# Patient Record
Sex: FEMALE | Race: WHITE | Employment: OTHER | URBAN - METROPOLITAN AREA
[De-identification: names, ages, dates, MRNs, and addresses within clinical notes are randomized per-mention and may not be internally consistent; named-entity substitution may affect disease eponyms.]

---

## 2017-01-25 DIAGNOSIS — Z00.00 ROUTINE GENERAL MEDICAL EXAMINATION AT A HEALTH CARE FACILITY: ICD-10-CM

## 2017-01-25 DIAGNOSIS — Z13.6 CARDIOVASCULAR SCREENING; LDL GOAL LESS THAN 130: ICD-10-CM

## 2017-01-25 LAB
ANION GAP SERPL CALCULATED.3IONS-SCNC: 7 MMOL/L (ref 3–14)
BUN SERPL-MCNC: 14 MG/DL (ref 7–30)
CALCIUM SERPL-MCNC: 9.2 MG/DL (ref 8.5–10.1)
CHLORIDE SERPL-SCNC: 105 MMOL/L (ref 94–109)
CHOLEST SERPL-MCNC: 259 MG/DL
CO2 SERPL-SCNC: 30 MMOL/L (ref 20–32)
CREAT SERPL-MCNC: 0.69 MG/DL (ref 0.52–1.04)
GFR SERPL CREATININE-BSD FRML MDRD: 87 ML/MIN/1.7M2
GLUCOSE SERPL-MCNC: 100 MG/DL (ref 70–99)
HDLC SERPL-MCNC: 46 MG/DL
LDLC SERPL CALC-MCNC: 177 MG/DL
NONHDLC SERPL-MCNC: 213 MG/DL
POTASSIUM SERPL-SCNC: 4.1 MMOL/L (ref 3.4–5.3)
SODIUM SERPL-SCNC: 142 MMOL/L (ref 133–144)
TRIGL SERPL-MCNC: 181 MG/DL

## 2017-01-25 PROCEDURE — 80048 BASIC METABOLIC PNL TOTAL CA: CPT | Performed by: FAMILY MEDICINE

## 2017-01-25 PROCEDURE — 80061 LIPID PANEL: CPT | Performed by: FAMILY MEDICINE

## 2017-01-25 PROCEDURE — 36415 COLL VENOUS BLD VENIPUNCTURE: CPT | Performed by: FAMILY MEDICINE

## 2017-01-30 ENCOUNTER — OFFICE VISIT (OUTPATIENT)
Dept: FAMILY MEDICINE | Facility: CLINIC | Age: 62
End: 2017-01-30
Payer: COMMERCIAL

## 2017-01-30 VITALS
WEIGHT: 166 LBS | HEART RATE: 88 BPM | BODY MASS INDEX: 30.55 KG/M2 | RESPIRATION RATE: 16 BRPM | HEIGHT: 62 IN | DIASTOLIC BLOOD PRESSURE: 92 MMHG | TEMPERATURE: 97.2 F | SYSTOLIC BLOOD PRESSURE: 142 MMHG | OXYGEN SATURATION: 96 %

## 2017-01-30 DIAGNOSIS — Z00.00 ROUTINE GENERAL MEDICAL EXAMINATION AT A HEALTH CARE FACILITY: Primary | ICD-10-CM

## 2017-01-30 DIAGNOSIS — R05.3 PERSISTENT DRY COUGH: ICD-10-CM

## 2017-01-30 DIAGNOSIS — Z13.6 CARDIOVASCULAR SCREENING; LDL GOAL LESS THAN 160: ICD-10-CM

## 2017-01-30 PROCEDURE — 99396 PREV VISIT EST AGE 40-64: CPT | Performed by: FAMILY MEDICINE

## 2017-01-30 ASSESSMENT — PAIN SCALES - GENERAL: PAINLEVEL: NO PAIN (0)

## 2017-01-30 NOTE — NURSING NOTE
"Chief Complaint   Patient presents with     Physical       Initial /92 mmHg  Pulse 102  Temp(Src) 97.2  F (36.2  C) (Temporal)  Resp 16  Ht 5' 1.75\" (1.568 m)  Wt 166 lb (75.297 kg)  BMI 30.63 kg/m2  SpO2 96%  LMP 05/13/2004 Estimated body mass index is 30.63 kg/(m^2) as calculated from the following:    Height as of this encounter: 5' 1.75\" (1.568 m).    Weight as of this encounter: 166 lb (75.297 kg).  BP completed using cuff size: chantale Novoa MA    "

## 2017-01-30 NOTE — MR AVS SNAPSHOT
After Visit Summary   1/30/2017    Stacie Hair    MRN: [id number]           Patient Information     Date Of Birth          1955        Visit Information        Provider Department      1/30/2017 2:30 PM Schoen, Gregory G, MD [facility]        Today's Diagnoses     Routine general medical examination at a health care facility    -  1     Persistent dry cough           Care Instructions      Preventive Health Recommendations  Female Ages 50 - 64    Yearly exam: See your health care provider every year in order to  o Review health changes.   o Discuss preventive care.    o Review your medicines if your doctor has prescribed any.      Get a Pap test every three years (unless you have an abnormal result and your provider advises testing more often).    If you get Pap tests with HPV test, you only need to test every 5 years, unless you have an abnormal result.     You do not need a Pap test if your uterus was removed (hysterectomy) and you have not had cancer.    You should be tested each year for STDs (sexually transmitted diseases) if you're at risk.     Have a mammogram every 1 to 2 years.    Have a colonoscopy at age 50, or have a yearly FIT test (stool test). These exams screen for colon cancer.      Have a cholesterol test every 5 years, or more often if advised.    Have a diabetes test (fasting glucose) every three years. If you are at risk for diabetes, you should have this test more often.     If you are at risk for osteoporosis (brittle bone disease), think about having a bone density scan (DEXA).    Shots: Get a flu shot each year. Get a tetanus shot every 10 years.    Nutrition:     Eat at least 5 servings of fruits and vegetables each day.    Eat whole-grain bread, whole-wheat pasta and brown rice instead of white grains and rice.    Talk to your provider about Calcium and Vitamin D.     Lifestyle    Exercise at least 150 minutes a week (30 minutes a day, 5 days a week).  "This will help you control your weight and prevent disease.    Limit alcohol to one drink per day.    No smoking.     Wear sunscreen to prevent skin cancer.     See your dentist every six months for an exam and cleaning.    See your eye doctor every 1 to 2 years.            Follow-ups after your visit        Future tests that were ordered for you today     Open Future Orders        Priority Expected Expires Ordered    General PFT Lab (Please always keep checked) Routine  2018    Pulmonary Function Test Routine  2018            Who to contact     If you have questions or need follow up information about today's clinic visit or your schedule please contact [facility] directly at [phone].  Normal or non-critical lab and imaging results will be communicated to you by MyChart, letter or phone within 4 business days after the clinic has received the results. If you do not hear from us within 7 days, please contact the clinic through Bahuhart or phone. If you have a critical or abnormal lab result, we will notify you by phone as soon as possible.  Submit refill requests through [employer]iz[employer]une or call your pharmacy and they will forward the refill request to us. Please allow 3 business days for your refill to be completed.          Additional Information About Your Visit        BahuharSupportPay Information     [employer]iz[employer]une lets you send messages to your doctor, view your test results, renew your prescriptions, schedule appointments and more. To sign up, go to [email or web address] . Click on \"Log in\" on the left side of the screen, which will take you to the Welcome page. Then click on \"Sign up Now\" on the right side of the page.     You will be asked to enter the access code listed below, as well as some personal information. Please follow the directions to create your username and password.     Your access code is: [email or web address]  Expires: 2017  3:24 PM     Your access code will  in " "90 days. If you need help or a new code, please call your [place] clinic or [phone].        Care EveryWhere ID     This is your Care EveryWhere ID. This could be used by other organizations to access your [place] medical records  [id number]        Your Vitals Were     Pulse Temperature Respirations    88 97.2  F (36.2  C) (Temporal) 16    Height BMI (Body Mass Index) Pulse Oximetry    5' 1.75\" (1.568 m) 30.63 kg/m2 96%    Last Period          05/13/2004         Blood Pressure from Last 3 Encounters:   01/30/17 142/92   09/21/15 132/60   09/09/15 140/78    Weight from Last 3 Encounters:   01/30/17 166 lb (75.297 kg)   12/14/15 160 lb (72.576 kg)   09/28/15 157 lb (71.215 kg)                 Today's Medication Changes          These changes are accurate as of: 1/30/17  3:24 PM.  If you have any questions, ask your nurse or doctor.               Stop taking these medicines if you haven't already. Please contact your care team if you have questions.     vitamin  s/Minerals Tabs   Stopped by:  Schoen, Gregory G, MD                    Primary Care Provider Office Phone # Fax #    Gregory G Schoen, [phone]       [facility] [address] [place] [address]        Thank you!     Thank you for choosing [facility]  for your care. Our goal is always to provide you with excellent care. Hearing back from our patients is one way we can continue to improve our services. Please take a few minutes to complete the written survey that you may receive in the mail after your visit with us. Thank you!             Your Updated Medication List - Protect others around you: Learn how to safely use, store and throw away your medicines at www.disposemymeds.org.          This list is accurate as of: 1/30/17  3:24 PM.  Always use your most recent med list.                   Brand Name Dispense Instructions for use    NEW MED      VitaPulse       OMEGA 3 PO      Take by mouth " daily

## 2017-01-30 NOTE — PROGRESS NOTES
SUBJECTIVE:     CC: Stacie Hair is an 61 year old woman who presents for preventive health visit.     Here for well exam. Notes that in March she came down with a bad chest cold and thinks maybe she had pneumonia but was never seen.  She got better after several weeks and no longer has shortness of breath but she does continue to have a persistent dry cough that is more irritating than anything else.  She notes she had childhood asthma but outgrew that and has not used inhalers for years.  No issues with sinus congestion/drainage or sputum production.      Healthy Habits:    Do you get at least three servings of calcium containing foods daily (dairy, green leafy vegetables, etc.)? Maybe 1-2    Amount of exercise or daily activities, outside of work: not much    Problems taking medications regularly not applicable    Medication side effects: No    Have you had an eye exam in the past two years? no    Do you see a dentist twice per year? yes  Do you have sleep apnea, excessive snoring or daytime drowsiness?no      Current Outpatient Prescriptions   Medication Sig Dispense Refill     Omega-3 Fatty Acids (OMEGA 3 PO) Take by mouth daily       NEW MED VitaPulse             Today's PHQ-2 Score:   PHQ-2 ( 1999 Pfizer) 1/30/2017 9/9/2015   Q1: Little interest or pleasure in doing things 0 0   Q2: Feeling down, depressed or hopeless 0 0   PHQ-2 Score 0 0       Abuse: Current or Past(Physical, Sexual or Emotional)- No  Do you feel safe in your environment - Yes    Social History   Substance Use Topics     Smoking status: Former Smoker     Smokeless tobacco: Never Used      Comment: 1980-quit     Alcohol Use: 0.0 oz/week     0 Standard drinks or equivalent per week      Comment: occasional     The patient does not drink >3 drinks per day nor >7 drinks per week.    Recent Labs   Lab Test  01/25/17   1051  07/21/11   0716   CHOL  259*  254*   HDL  46*  53   LDL  177*  173*   TRIG  181*  139   CHOLHDLRATIO   --   5.0  "  [facility]  213*   --        Reviewed orders with patient.  Reviewed health maintenance and updated orders accordingly - Yes    Mammo Decision Support:  Patient over age 50, mutual decision to screen reflected in health maintenance.    Pertinent mammograms are reviewed under the imaging tab.  History of abnormal Pap smear: NO - age 30-65 PAP every 5 years with negative HPV co-testing recommended  All Histories reviewed and updated in Epic.      ROS:  C: NEGATIVE for fever, chills, has put on some weight with decreased activity over the winter  I: NEGATIVE for worrisome rashes, moles or lesions  E: NEGATIVE for vision changes or irritation  ENT: NEGATIVE for ear, mouth and throat problems  RESP:as above, chronic cough, history of asthma  B: NEGATIVE for masses, tenderness or discharge  CV: NEGATIVE for chest pain, palpitations or peripheral edema  GI: NEGATIVE for nausea, abdominal pain, heartburn, or change in bowel habits  : NEGATIVE for unusual urinary or vaginal symptoms. No vaginal bleeding.  M: NEGATIVE for significant arthralgias or myalgia  N: NEGATIVE for weakness, dizziness or paresthesias  P: NEGATIVE for changes in mood or affect       OBJECTIVE:     /92 mmHg  Pulse 102  Temp(Src) 97.2  F (36.2  C) (Temporal)  Resp 16  Ht 5' 1.75\" (1.568 m)  Wt 166 lb (75.297 kg)  BMI 30.63 kg/m2  SpO2 96%  LMP 05/13/2004  EXAM:  GENERAL: healthy, alert and no distress  EYES: Eyes grossly normal to inspection, PERRL and conjunctivae and sclerae normal  HENT: ear canals and TM's normal, nose and mouth without ulcers or lesions  NECK: no adenopathy, no asymmetry, masses, or scars and thyroid normal to palpation  RESP: lungs clear to auscultation - no rales, rhonchi or wheezes  CV: regular rate and rhythm, normal S1 S2, no S3 or S4, no murmur, click or rub, no peripheral edema and peripheral pulses strong  ABDOMEN: soft, nontender, no hepatosplenomegaly, no masses and bowel sounds normal  MS: no gross " musculoskeletal defects noted, no edema  SKIN: no suspicious lesions or rashes  NEURO: Normal strength and tone, mentation intact and speech normal  PSYCH: mentation appears normal, affect normal/bright    Results for orders placed or performed in visit on 01/25/17   LIPID REFLEX TO DIRECT LDL PANEL   Result Value Ref Range    Cholesterol 259 (H) <200 mg/dL    Triglycerides 181 (H) <150 mg/dL    HDL Cholesterol 46 (L) >49 mg/dL    LDL Cholesterol Calculated 177 (H) <100 mg/dL    Non HDL Cholesterol 213 (H) <130 mg/dL   Basic metabolic panel   Result Value Ref Range    Sodium 142 133 - 144 mmol/L    Potassium 4.1 3.4 - 5.3 mmol/L    Chloride 105 94 - 109 mmol/L    Carbon Dioxide 30 20 - 32 mmol/L    Anion Gap 7 3 - 14 mmol/L    Glucose 100 (H) 70 - 99 mg/dL    Urea Nitrogen 14 7 - 30 mg/dL    Creatinine 0.69 0.52 - 1.04 mg/dL    GFR Estimate 87 >60 mL/min/1.7m2    GFR Estimate If Black >90   GFR Calc   >60 mL/min/1.7m2    Calcium 9.2 8.5 - 10.1 mg/dL         ASSESSMENT/PLAN:          Routine general medical examination at a health care facility   Up to date on screening exams.  Discussed every other year mammography due this coming September.  Also good on Pap for 5 years from last done in 2015.   Persistent dry cough   Given prior history of asthma and completely normal lung exam today, PFTs have been ordered to assess for indications of recurrence of asthma. If findings are negative, might consider CXR and then a course of   inhaled corticosteroids.   CARDIOVASCULAR SCREENING; LDL GOAL LESS THAN 160   Her lipids run high in regard to LDL but she is resistant to taking any statin medications.  I did assess her risk score for the next 10 years which came back at 5.8%, below the threshold where a strong statin indication   would be clear.  She notes she his been omega 3 fish oil and lucrecia pulse vits that are supposed to help lower lipids but do not appear to be doing so. She wishes to engage increased  "exercise/activity, better dieting    (discussed mediterranean diet) and rechecking after some weight reduction (hopes to lose 10=15 pounds).   Elevated blood pressure   Similar to above, reluctant to take any medication and prefers to reduce caffeine, already using a salt substitute product and work on weight reduction and exercise.  Recommended she monitor pressures at home and   if continue above 140/90 despite these measures, initiating treatment would be appropriate.       COUNSELING:   Reviewed preventive health counseling, as reflected in patient instructions       Regular exercise       Healthy diet/nutrition         reports that she has quit smoking. She has never used smokeless tobacco.    Estimated body mass index is 30.63 kg/(m^2) as calculated from the following:    Height as of this encounter: 5' 1.75\" (1.568 m).    Weight as of this encounter: 166 lb (75.297 kg).       Counseling Resources:  ATP IV Guidelines  Pooled Cohorts Equation Calculator  Breast Cancer Risk Calculator  FRAX Risk Assessment  ICSI Preventive Guidelines  Dietary Guidelines for Americans, 2010  USDA's MyPlate  ASA Prophylaxis  Lung CA Screening    Gregory G. Schoen, MD  [facility]  "

## 2017-03-08 ENCOUNTER — HOSPITAL ENCOUNTER (OUTPATIENT)
Dept: RESPIRATORY THERAPY | Facility: CLINIC | Age: 62
Discharge: HOME | End: 2017-03-08
Attending: FAMILY MEDICINE | Admitting: FAMILY MEDICINE
Payer: COMMERCIAL

## 2017-03-08 PROCEDURE — 94729 DIFFUSING CAPACITY: CPT

## 2017-03-08 PROCEDURE — 94060 EVALUATION OF WHEEZING: CPT

## 2017-03-08 PROCEDURE — 25000125 ZZHC RX 250: Performed by: FAMILY MEDICINE

## 2017-03-08 PROCEDURE — 94726 PLETHYSMOGRAPHY LUNG VOLUMES: CPT

## 2017-03-08 RX ORDER — ALBUTEROL SULFATE 0.83 MG/ML
2.5 SOLUTION RESPIRATORY (INHALATION)
Status: COMPLETED | OUTPATIENT
Start: 2017-03-08 | End: 2017-03-08

## 2017-03-08 RX ADMIN — ALBUTEROL SULFATE 2.5 MG: 2.5 SOLUTION RESPIRATORY (INHALATION) at 15:03

## 2017-03-08 NOTE — PROGRESS NOTES
The FVC, FEV1, FEV1/FVC ratio and IYZ57-18% are within normal limits.  The inspiratory flow rates are reduced.  Lung volumes are within normal limits.  Following administration of bronchodilators, there is no significant response.  The diffusing capacity is normal.  However, the diffusing capacity was not corrected for the patient's hemoglobin.    The results are within normal limits.    IMPRESSION:  Normal Pulmonary Function        This preliminary report should not be used clinically unless reviewed and signed by a physician.

## 2017-03-08 NOTE — PROGRESS NOTES
Pre-Bronch    Post-Bronch        Actual Pred %Pred  Actual %Pred %Chng      ---- SPIROMETRY ----                        FVC (L)  2.33 2.82 82   2.35 83 +0          FEV1 (L)  2.02 2.24 90   2.12 94 +5          FEV1/FVC (%) 87 80     90   +4          FEV1/SVC (%) 82 79                    FEV1/FEV6 (%) 87 81     90   +4          FEF Max (L/sec) 6.02 5.81 103   4.51 77 -25          FEF 25-75% (L/sec) 2.81 2.10 133   3.68 175 +30          FIVC (L)  2.23       2.24   +0          FIF Max (L/sec) 1.42 4.54 31   1.49 32 +5          Expiratory Time (sec) 6.11       7.26   +18          ----LUNG MECHANICS                         MVV (L/min)   85                    MEP (cmH2O)                        MIP (cmH2O)                        ---- LUNG VOLUMES ----                        SVC (L) 2.48 2.82 87                  IC (L) 2.24 2.39 93                  ERV (L) 0.24 0.43 55                  FRC(Pleth) (L) 2.16 2.53 85                  RV (Pleth) (L) 1.92 1.78 107                  TLC (Pleth) (L) 4.39 4.44 98                  RV/TLC (Pleth) (%) 44 40                    ---- DIFFUSION ----                        DLCOunc (ml/min/mmHg) 21.57 20.33 106                  DLCOcor (ml/min/mmHg)   20.33                    DL/VA (ml/min/mmHg/L) 6.00 4.45                    VA (L) 3.59 4.57 78                  IVC (L) 2.26                      Hgb (gm/dL)   12-18

## 2017-03-08 NOTE — DISCHARGE INSTRUCTIONS
Thank you for completing pulmonary function testing today.  All results will be scanned into your epic results for your doctor to review.  Please resume taking all your current prescribed medications and diet as directed by your provider.   If you have not heard from your provider about your testing within two weeks and do not have a follow-up appointment scheduled with them please contact your provider about any questions you have concerning your testing.   Thank you  [facility] Pulmonary Function Lab

## 2017-03-23 LAB
DLCOUNC-%PRED-PRE: 106 %
DLCOUNC-PRE: 21.57 ML/MIN/MMHG
DLCOUNC-PRED: 20.33 ML/MIN/MMHG
ERV-%PRED-PRE: 55 %
ERV-PRE: 0.24 L
ERV-PRED: 0.43 L
EXPTIME-PRE: 6.11 SEC
FEF2575-%PRED-POST: 175 %
FEF2575-%PRED-PRE: 133 %
FEF2575-POST: 3.68 L/SEC
FEF2575-PRE: 2.81 L/SEC
FEF2575-PRED: 2.1 L/SEC
FEFMAX-%PRED-PRE: 103 %
FEFMAX-PRE: 6.02 L/SEC
FEFMAX-PRED: 5.81 L/SEC
FEV1-%PRED-PRE: 90 %
FEV1-PRE: 2.02 L
FEV1FEV6-PRE: 87 %
FEV1FEV6-PRED: 81 %
FEV1FVC-PRE: 87 %
FEV1FVC-PRED: 80 %
FEV1SVC-PRE: 82 %
FEV1SVC-PRED: 79 %
FIFMAX-PRE: 1.42 L/SEC
FRCPLETH-%PRED-PRE: 85 %
FRCPLETH-PRE: 2.16 L
FRCPLETH-PRED: 2.53 L
FVC-%PRED-PRE: 82 %
FVC-PRE: 2.33 L
FVC-PRED: 2.82 L
GAW-%PRED-PRE: 12 %
GAW-PRE: 0.13 L/S/CMH2O
GAW-PRED: 1.03 L/S/CMH2O
IC-%PRED-PRE: 93 %
IC-PRE: 2.24 L
IC-PRED: 2.39 L
RVPLETH-%PRED-PRE: 107 %
RVPLETH-PRE: 1.92 L
RVPLETH-PRED: 1.78 L
SGAW-%PRED-PRE: 68 %
SGAW-PRE: 0.07 1/CMH2O*S
SGAW-PRED: 0.1 1/CMH2O*S
SRAW-%PRED-PRE: 373 %
SRAW-PRE: 17.77 CMH2O*S
SRAW-PRED: 4.76 CMH2O*S
TLCPLETH-%PRED-PRE: 98 %
TLCPLETH-PRE: 4.39 L
TLCPLETH-PRED: 4.44 L
VA-%PRED-PRE: 78 %
VA-PRE: 3.59 L
VC-%PRED-PRE: 87 %
VC-PRE: 2.48 L
VC-PRED: 2.82 L

## 2018-01-24 ENCOUNTER — TELEPHONE (OUTPATIENT)
Dept: FAMILY MEDICINE | Facility: CLINIC | Age: 63
End: 2018-01-24

## 2018-01-24 NOTE — TELEPHONE ENCOUNTER
Panel Management Review      Patient has the following on her problem list: None      Composite cancer screening  Chart review shows that this patient is due/due soon for the following Mammogram  Summary:    Patient is due/failing the following:   MAMMOGRAM    Action needed:   Patient needs referral/order: Mammogram    Type of outreach:    Sent letter.    Questions for provider review:    None                                                                                                                                    [facility]itt[facility]         Chart routed to  .

## 2018-01-24 NOTE — LETTER
[address] [address] [phone]        Stacie Hair  [address] [facility] [address]      January 24, 2018      Dear Stacie,    I care about your health and have reviewed your health plan, including your medical conditions, medication list, and lab results and am making recommendations based on this review, to better manage your health.    You are in particular need of attention regarding:  -Breast Cancer Screening    I am recommending that you:  -schedule a MAMMOGRAM which is due. You can call  [phone] to schedule your mammogram.      If you've had the preventative screening completed at another facility or feel you're not due for this screening, please call our clinic at the number listed above or send us a PastBook Chart message so we can update our records. We would like to thank you in advance for taking the time to take care of your health.  If you have any questions, please don t hesitate to contact our clinic.    Sincerely,       Your [facility]

## 2018-07-18 ENCOUNTER — OFFICE VISIT (OUTPATIENT)
Dept: URGENT CARE | Facility: RETAIL CLINIC | Age: 63
End: 2018-07-18
Payer: COMMERCIAL

## 2018-07-18 VITALS
SYSTOLIC BLOOD PRESSURE: 165 MMHG | OXYGEN SATURATION: 96 % | HEART RATE: 70 BPM | TEMPERATURE: 98.5 F | DIASTOLIC BLOOD PRESSURE: 98 MMHG

## 2018-07-18 DIAGNOSIS — L03.115 CELLULITIS OF RIGHT LOWER EXTREMITY: Primary | ICD-10-CM

## 2018-07-18 PROCEDURE — 99213 OFFICE O/P EST LOW 20 MIN: CPT | Performed by: PHYSICIAN ASSISTANT

## 2018-07-18 ASSESSMENT — ENCOUNTER SYMPTOMS
PALPITATIONS: 0
FATIGUE: 0
WHEEZING: 0
VOMITING: 0
CHEST TIGHTNESS: 0
COUGH: 0
ROS SKIN COMMENTS: SKIN INFECTION
UNEXPECTED WEIGHT CHANGE: 0
SINUS PRESSURE: 0
MYALGIAS: 0
EYE PAIN: 0
SORE THROAT: 0
BACK PAIN: 0
ARTHRALGIAS: 0
NAUSEA: 0
DIARRHEA: 0
RHINORRHEA: 0
EYE REDNESS: 0
CHILLS: 0
FEVER: 0
TROUBLE SWALLOWING: 0
SHORTNESS OF BREATH: 0
ABDOMINAL PAIN: 0

## 2018-07-18 NOTE — MR AVS SNAPSHOT
"              After Visit Summary   2018    Stacie Hair    MRN: [id number]           Patient Information     Date Of Birth          1955        Visit Information        Provider Department      2018 9:50 AM Maritza Boyce PA-C [facility]        Today's Diagnoses     Cellulitis of right lower extremity    -  1       Follow-ups after your visit        Follow-up notes from your care team     Return if symptoms worsen or fail to improve.      Who to contact     You can reach your care team any time of the day by calling [phone].  Notification of test results:  If you have an abnormal lab result, we will notify you by phone as soon as possible.         Additional Information About Your Visit        MyChart Information     [employer]V Corporationhart lets you send messages to your doctor, view your test results, renew your prescriptions, schedule appointments and more. To sign up, go to [email or web address] . Click on \"Log in\" on the left side of the screen, which will take you to the Welcome page. Then click on \"Sign up Now\" on the right side of the page.     You will be asked to enter the access code listed below, as well as some personal information. Please follow the directions to create your username and password.     Your access code is: [email or web address]  Expires: 10/16/2018 11:02 AM     Your access code will  in 90 days. If you need help or a new code, please call your [place] clinic or [phone].        Care EveryWhere ID     This is your Care EveryWhere ID. This could be used by other organizations to access your [place] medical records  [id number]        Your Vitals Were     Pulse Temperature Last Period Pulse Oximetry          70 98.5  F (36.9  C) (Oral) 2004 96%         Blood Pressure from Last 3 Encounters:   18 (!) 165/98   17 (!) 142/92   09/21/15 132/60    Weight from Last 3 Encounters:   17 166 lb (75.3 kg)   12/14/15 160 lb (72.6 kg) "   09/28/15 157 lb (71.2 kg)              Today, you had the following     No orders found for display         Today's Medication Changes          These changes are accurate as of 7/18/18 11:02 AM.  If you have any questions, ask your nurse or doctor.               Start taking these medicines.        Dose/Directions    amoxicillin-clavulanate 875-125 MG per tablet   Commonly known as:  AUGMENTIN   Used for:  Cellulitis of right lower extremity   Started by:  Maritza Boyce PA-C        Dose:  1 tablet   Take 1 tablet by mouth 2 times daily for 10 days   Quantity:  20 tablet   Refills:  0            Where to get your medicines      These medications were sent to [facility] [place], [place] - [address]   [address] , [facility] [address]     Phone:  [phone]     amoxicillin-clavulanate 875-125 MG per tablet                Primary Care Provider Office Phone # Fax #    Gregory G Schoen, [phone]       [address]   [place] [address]        Equal Access to Services     [facility]: Hadii aad ku hadasho Soomaali, waaxda luqadaha, qaybta kaalmada adeegyada, waxay idiin haynicko woodward . So [facility] [phone].    ATENCIÓN: Si habla español, tiene a hernandez disposición servicios gratuitos de asistencia lingüística. Llame al [phone].    We comply with applicable federal civil rights laws and [place] laws. We do not discriminate on the basis of race, color, national origin, age, disability, sex, sexual orientation, or gender identity.            Thank you!     Thank you for choosing [facility]  for your care. Our goal is always to provide you with excellent care. Hearing back from our patients is one way we can continue to improve our services. Please take a few minutes to complete the written survey that you may receive in the mail after your visit with us. Thank you!             Your Updated Medication List - Protect others  around you: Learn how to safely use, store and throw away your medicines at www.disposemymeds.org.          This list is accurate as of 7/18/18 11:02 AM.  Always use your most recent med list.                   Brand Name Dispense Instructions for use Diagnosis    amoxicillin-clavulanate 875-125 MG per tablet    AUGMENTIN    20 tablet    Take 1 tablet by mouth 2 times daily for 10 days    Cellulitis of right lower extremity       CALCIUM + D3 PO           Magnesium 80 MG Tabs           NEW MED      VitaPulse        OMEGA 3 PO      Take by mouth daily

## 2018-07-18 NOTE — PROGRESS NOTES
SUBJECTIVE:   Stacie Hair is a 63 year old female presenting with a chief complaint of   Chief Complaint   Patient presents with     Derm Problem     saturday she notices foot getting red, [place]        She is an established patient of [facility].    Rash    Onset of rash was 4 day(s) ago.   Course of illness is gradual onset.  Severity moderate  Current and Associated symptoms: painful and red   Location of the rash: foot.  Previous history of a similar rash? No  Recent exposure history: [place]   Denies exposure to: none known  Associated symptoms include: nothing.  Treatment measures tried include: antibiotic cream      Review of Systems   Constitutional: Negative for chills, fatigue, fever and unexpected weight change.   HENT: Negative for congestion, ear pain, postnasal drip, rhinorrhea, sinus pressure, sore throat and trouble swallowing.    Eyes: Negative for pain, redness and visual disturbance.   Respiratory: Negative for cough, chest tightness, shortness of breath and wheezing.    Cardiovascular: Negative for chest pain and palpitations.   Gastrointestinal: Negative for abdominal pain, diarrhea, nausea and vomiting.   Musculoskeletal: Negative for arthralgias, back pain and myalgias.   Skin: Negative for rash.        Skin infection        Past Medical History:   Diagnosis Date     Herniated discs      Myalgia and myositis, unspecified     Fibromyalgia     Family History   Problem Relation Age of Onset     Hypertension Mother      HEART DISEASE Father      MI     Current Outpatient Prescriptions   Medication Sig Dispense Refill     amoxicillin-clavulanate (AUGMENTIN) 875-125 MG per tablet Take 1 tablet by mouth 2 times daily for 10 days 20 tablet 0     Calcium Carb-Cholecalciferol (CALCIUM + D3 PO)        Magnesium 80 MG TABS        NEW MED VitaPulse       Omega-3 Fatty Acids (OMEGA 3 PO) Take by mouth daily       Social History    Substance Use Topics     Smoking status: Former Smoker     Smokeless tobacco: Never Used      Comment: 1980-quit     Alcohol use 0.0 oz/week     0 Standard drinks or equivalent per week      Comment: occasional       OBJECTIVE  BP (!) 165/98 (BP Location: Right arm, Patient Position: Chair, Cuff Size: Adult Regular)  Pulse 70  Temp 98.5  F (36.9  C) (Oral)  LMP 05/13/2004  SpO2 96%    Physical Exam   Constitutional: She appears well-developed and well-nourished.   HENT:   Head: Normocephalic.   Right Ear: Tympanic membrane and ear canal normal.   Left Ear: Tympanic membrane and ear canal normal.   Mouth/Throat: Oropharynx is clear and moist.   Eyes: Conjunctivae are normal. Pupils are equal, round, and reactive to light.   Cardiovascular: Normal rate and normal heart sounds.    Pulmonary/Chest: Effort normal and breath sounds normal.   Skin: Skin is warm and dry. No rash noted.        Base of right 4th and 5th toes are warm and erythematous. There is faint lymphatic streaking on the top of right foot.    Psychiatric: She has a normal mood and affect. Her behavior is normal.       Labs:  No results found for this or any previous visit (from the past 24 hour(s)).    X-Ray was not done.    ASSESSMENT:      ICD-10-CM    1. Cellulitis of right lower extremity L03.115 amoxicillin-clavulanate (AUGMENTIN) 875-125 MG per tablet        Medical Decision Making:    Differential Diagnosis:  Cellulitis, erysipelas, gout     Serious Comorbid Conditions:  Adult:  None    PLAN:    Will treat with Augmentin x 10 days. Keep area clean and dry.  Return to clinic if symptoms worsen or do not improve in the next 24-48hrs. She agrees with plan.     Followup:    If not improving or if condition worsens, follow up with your Primary Care Provider    There are no Patient Instructions on file for this visit.

## 2018-08-27 ENCOUNTER — TELEPHONE (OUTPATIENT)
Dept: FAMILY MEDICINE | Facility: CLINIC | Age: 63
End: 2018-08-27

## 2018-08-27 ENCOUNTER — HOSPITAL ENCOUNTER (EMERGENCY)
Facility: CLINIC | Age: 63
Discharge: HOME | End: 2018-08-27
Attending: EMERGENCY MEDICINE | Admitting: EMERGENCY MEDICINE
Payer: COMMERCIAL

## 2018-08-27 ENCOUNTER — APPOINTMENT (OUTPATIENT)
Dept: CT IMAGING | Facility: CLINIC | Age: 63
End: 2018-08-27
Attending: EMERGENCY MEDICINE
Payer: COMMERCIAL

## 2018-08-27 VITALS
OXYGEN SATURATION: 94 % | TEMPERATURE: 97.8 F | WEIGHT: 165 LBS | SYSTOLIC BLOOD PRESSURE: 188 MMHG | HEART RATE: 70 BPM | RESPIRATION RATE: 16 BRPM | DIASTOLIC BLOOD PRESSURE: 97 MMHG | BODY MASS INDEX: 30.42 KG/M2

## 2018-08-27 DIAGNOSIS — I10 HYPERTENSION, UNSPECIFIED TYPE: ICD-10-CM

## 2018-08-27 LAB
ANION GAP SERPL CALCULATED.3IONS-SCNC: 7 MMOL/L (ref 3–14)
BASOPHILS # BLD AUTO: 0 10E9/L (ref 0–0.2)
BASOPHILS NFR BLD AUTO: 0.5 %
BUN SERPL-MCNC: 11 MG/DL (ref 7–30)
CALCIUM SERPL-MCNC: 8.8 MG/DL (ref 8.5–10.1)
CHLORIDE SERPL-SCNC: 107 MMOL/L (ref 94–109)
CO2 SERPL-SCNC: 27 MMOL/L (ref 20–32)
CREAT SERPL-MCNC: 0.72 MG/DL (ref 0.52–1.04)
DIFFERENTIAL METHOD BLD: NORMAL
EOSINOPHIL NFR BLD AUTO: 3.5 %
ERYTHROCYTE [DISTWIDTH] IN BLOOD BY AUTOMATED COUNT: 13.6 % (ref 10–15)
GFR SERPL CREATININE-BSD FRML MDRD: 83 ML/MIN/1.7M2
GLUCOSE SERPL-MCNC: 94 MG/DL (ref 70–99)
HCT VFR BLD AUTO: 42.6 % (ref 35–47)
HGB BLD-MCNC: 13.9 G/DL (ref 11.7–15.7)
IMM GRANULOCYTES # BLD: 0 10E9/L (ref 0–0.4)
IMM GRANULOCYTES NFR BLD: 0.2 %
LYMPHOCYTES # BLD AUTO: 1.7 10E9/L (ref 0.8–5.3)
LYMPHOCYTES NFR BLD AUTO: 26.5 %
MCH RBC QN AUTO: 29.8 PG (ref 26.5–33)
MCHC RBC AUTO-ENTMCNC: 32.6 G/DL (ref 31.5–36.5)
MCV RBC AUTO: 91 FL (ref 78–100)
MONOCYTES # BLD AUTO: 0.4 10E9/L (ref 0–1.3)
MONOCYTES NFR BLD AUTO: 7.1 %
NEUTROPHILS # BLD AUTO: 3.9 10E9/L (ref 1.6–8.3)
NEUTROPHILS NFR BLD AUTO: 62.2 %
NRBC # BLD AUTO: 0 10*3/UL
NRBC BLD AUTO-RTO: 0 /100
PLATELET # BLD AUTO: 274 10E9/L (ref 150–450)
POTASSIUM SERPL-SCNC: 4.1 MMOL/L (ref 3.4–5.3)
RBC # BLD AUTO: 4.66 10E12/L (ref 3.8–5.2)
SODIUM SERPL-SCNC: 141 MMOL/L (ref 133–144)
WBC # BLD AUTO: 6.2 10E9/L (ref 4–11)

## 2018-08-27 PROCEDURE — 80048 BASIC METABOLIC PNL TOTAL CA: CPT | Performed by: EMERGENCY MEDICINE

## 2018-08-27 PROCEDURE — 36415 COLL VENOUS BLD VENIPUNCTURE: CPT | Performed by: EMERGENCY MEDICINE

## 2018-08-27 PROCEDURE — 99284 EMERGENCY DEPT VISIT MOD MDM: CPT | Mod: 25 | Performed by: EMERGENCY MEDICINE

## 2018-08-27 PROCEDURE — 85025 COMPLETE CBC W/AUTO DIFF WBC: CPT | Performed by: EMERGENCY MEDICINE

## 2018-08-27 PROCEDURE — 70450 CT HEAD/BRAIN W/O DYE: CPT

## 2018-08-27 PROCEDURE — 99283 EMERGENCY DEPT VISIT LOW MDM: CPT | Mod: Z6 | Performed by: EMERGENCY MEDICINE

## 2018-08-27 ASSESSMENT — ENCOUNTER SYMPTOMS: DIARRHEA: 1

## 2018-08-27 NOTE — ED AVS SNAPSHOT
[facility] Emergency Department    [address] [place] [address]    Phone:  [phone]    Fax:  [phone]                                       Stacie Hair   MRN: [id number]    Department:  [facility] Emergency Department   Date of Visit:  8/27/2018           After Visit Summary Signature Page     I have received my discharge instructions, and my questions have been answered. I have discussed any challenges I see with this plan with the nurse or doctor.    ..........................................................................................................................................  Patient/Patient Representative Signature      ..........................................................................................................................................  Patient Representative Print Name and Relationship to Patient    ..................................................               ................................................  Date                                            Time    ..........................................................................................................................................  Reviewed by Signature/Title    ...................................................              ..............................................  Date                                                            Time          22EPIC Rev 08/18

## 2018-08-27 NOTE — DISCHARGE INSTRUCTIONS
High Blood Pressure, To Be Confirmed, No Treatment  Your blood pressure today was higher than normal. Sometimes anxiety or pain can cause a temporary rise in blood pressure. It later returns to normal. Blood pressure that is high only one time doesn t mean that you have high blood pressure (hypertension). High blood pressure is a chronic illness. But you should have your blood pressure measured again within the next few days to find out if it s still high.    Blood pressure measurements are given as 2 numbers. Systolic blood pressure is the upper number. This is the pressure when the heart contracts. Diastolic blood pressure is the lower number. This is the pressure when the heart relaxes between beats. You will see your blood pressure readings written together. For example, a person with a systolic pressure of 118 and a diastolic pressure of 78 will have 118/78 written in the medical record.  Blood pressure is categorized as normal, elevated, or stage 1 or stage 2 high blood pressure:    Normal blood pressure is systolic of less than 120 and diastolic of less than 80 (120/80)    Elevated blood pressure is systolic of 120 to 129 and diastolic less than 80    Stage 1 high blood pressure is systolic is 130 to 139 or diastolic between 80 to 89    Stage 2 high blood pressure is when systolic is 140 or higher or the diastolic is 90 or higher  Lifestyle changes such as weight loss, exercise, and quitting smoking, can help manage your blood pressure. Have your blood pressure checked regularly to be sure it is under control.  Home care  To track your blood pressure, your provider may ask you to come into the office at different times and on different days. If your healthcare provider asks you to check your readings at home, ask him or her what times of the day to test and for how many days. Before you leave the office, ask your provider to show you how to take your blood pressure and be sure to ask questions if you don't  understand something.  Consider buying an automatic blood pressure monitor. Ask your provider for a recommendation as well as the proper size cuff to fit your arm. You can buy blood pressure monitors at most pharmacies.  The American Heart Association recommends the following guidelines for home blood pressure monitoring:    Don't smoke or drink coffee or other caffeinated drinks for 30 minutes before taking your blood pressure.    Go to the bathroom before the test.    Relax for 5 minutes before taking the measurement.    Sit with your back supported (don't sit on a couch or soft chair); keep your feet on the floor uncrossed. Place your arm on a solid flat surface (like a table) with the upper part of the arm at heart level. Place the middle of the cuff directly above the bend of the elbow. Check the monitor's instruction manual for an illustration.    Take multiple readings. When you measure, take 2 to 3 readings one minute apart and record all of the results.    Take your blood pressure at the same time every day, or as your healthcare provider recommends.    Record the date, time, and blood pressure reading.    Take the record with you to your next medical appointment. If your blood pressure monitor has a built-in memory, simply take the monitor with you to your next appointment.    Call your provider if you have several high readings. Don't be frightened by a single high blood pressure reading, but if you get several high readings, check in with your healthcare provider.    Note: When blood pressure reaches a systolic (top number) of 180 or higher OR diastolic (bottom number) of 110 or higher, seek emergency medical treatment.  Follow-up care  Keep all of your follow up appointments. If your blood pressure is more than 120 over 80 on 2 out of 3 days, you will need to follow up with your healthcare provider for more evaluation and treatment.  Don t put this off! High blood pressure can be treated. High blood  pressure that s not treated raises your risk for heart attack, heart failure, and stroke.  When to seek medical advice  Call your healthcare provider right away if any of these occur:    Blood pressure reaches a systolic (top number) of 180 or higher, OR diastolic (bottom number) of 110 or higher    Chest pain or shortness of breath    Severe headache    Throbbing or rushing sound in the ears    Nosebleed    Sudden severe pain in your belly (abdomen)    Extreme drowsiness, confusion, or fainting    Dizziness or dizziness with spinning sensation (vertigo)    Weakness of an arm or leg or one side of the face    You have problems speaking or seeing   Date Last Reviewed: 12/1/2016 2000-2017 [employer]. [address] [place] [address], [place] [id number]. All rights reserved. This information is not intended as a substitute for professional medical care. Always follow your healthcare professional's instructions.

## 2018-08-27 NOTE — ED AVS SNAPSHOT
[facility] Emergency Department    [address]     [place] [address]    Phone:  [phone]    Fax:  [phone]                                       Stacie Hair   MRN: [id number]    Department:  [facility] Emergency Department   Date of Visit:  8/27/2018           Patient Information     Date Of Birth          1955        Your diagnoses for this visit were:     Hypertension, unspecified type        You were seen by Jack Phoenix MD.      Follow-up Information     Follow up with Schoen, Gregory G, MD.    Specialty:  Family Practice    Why:  in the next 10 days    Contact information:    [address] [place] [id number] [phone]          Discharge Instructions         High Blood Pressure, To Be Confirmed, No Treatment  Your blood pressure today was higher than normal. Sometimes anxiety or pain can cause a temporary rise in blood pressure. It later returns to normal. Blood pressure that is high only one time doesn t mean that you have high blood pressure (hypertension). High blood pressure is a chronic illness. But you should have your blood pressure measured again within the next few days to find out if it s still high.    Blood pressure measurements are given as 2 numbers. Systolic blood pressure is the upper number. This is the pressure when the heart contracts. Diastolic blood pressure is the lower number. This is the pressure when the heart relaxes between beats. You will see your blood pressure readings written together. For example, a person with a systolic pressure of 118 and a diastolic pressure of 78 will have 118/78 written in the medical record.  Blood pressure is categorized as normal, elevated, or stage 1 or stage 2 high blood pressure:    Normal blood pressure is systolic of less than 120 and diastolic of less than 80 (120/80)    Elevated blood pressure is systolic of 120 to 129 and diastolic less than 80    Stage 1 high blood pressure is systolic  is 130 to 139 or diastolic between 80 to 89    Stage 2 high blood pressure is when systolic is 140 or higher or the diastolic is 90 or higher  Lifestyle changes such as weight loss, exercise, and quitting smoking, can help manage your blood pressure. Have your blood pressure checked regularly to be sure it is under control.  Home care  To track your blood pressure, your provider may ask you to come into the office at different times and on different days. If your healthcare provider asks you to check your readings at home, ask him or her what times of the day to test and for how many days. Before you leave the office, ask your provider to show you how to take your blood pressure and be sure to ask questions if you don't understand something.  Consider buying an automatic blood pressure monitor. Ask your provider for a recommendation as well as the proper size cuff to fit your arm. You can buy blood pressure monitors at most pharmacies.  The American Heart Association recommends the following guidelines for home blood pressure monitoring:    Don't smoke or drink coffee or other caffeinated drinks for 30 minutes before taking your blood pressure.    Go to the bathroom before the test.    Relax for 5 minutes before taking the measurement.    Sit with your back supported (don't sit on a couch or soft chair); keep your feet on the floor uncrossed. Place your arm on a solid flat surface (like a table) with the upper part of the arm at heart level. Place the middle of the cuff directly above the bend of the elbow. Check the monitor's instruction manual for an illustration.    Take multiple readings. When you measure, take 2 to 3 readings one minute apart and record all of the results.    Take your blood pressure at the same time every day, or as your healthcare provider recommends.    Record the date, time, and blood pressure reading.    Take the record with you to your next medical appointment. If your blood pressure  monitor has a built-in memory, simply take the monitor with you to your next appointment.    Call your provider if you have several high readings. Don't be frightened by a single high blood pressure reading, but if you get several high readings, check in with your healthcare provider.    Note: When blood pressure reaches a systolic (top number) of 180 or higher OR diastolic (bottom number) of 110 or higher, seek emergency medical treatment.  Follow-up care  Keep all of your follow up appointments. If your blood pressure is more than 120 over 80 on 2 out of 3 days, you will need to follow up with your healthcare provider for more evaluation and treatment.  Don t put this off! High blood pressure can be treated. High blood pressure that s not treated raises your risk for heart attack, heart failure, and stroke.  When to seek medical advice  Call your healthcare provider right away if any of these occur:    Blood pressure reaches a systolic (top number) of 180 or higher, OR diastolic (bottom number) of 110 or higher    Chest pain or shortness of breath    Severe headache    Throbbing or rushing sound in the ears    Nosebleed    Sudden severe pain in your belly (abdomen)    Extreme drowsiness, confusion, or fainting    Dizziness or dizziness with spinning sensation (vertigo)    Weakness of an arm or leg or one side of the face    You have problems speaking or seeing   Date Last Reviewed: 12/1/2016 2000-2017 [employer]. [address] [place] [address]. All rights reserved. This information is not intended as a substitute for professional medical care. Always follow your healthcare professional's instructions.          24 Hour Appointment Hotline       To make an appointment at any [facility], call [phone] ([phone]). If you don't have a family doctor or clinic, we will help you find one. [facility] clinics are conveniently located to serve the needs of you and your family.      "        Review of your medicines      Our records show that you are taking the medicines listed below. If these are incorrect, please call your family doctor or clinic.        Dose / Directions Last dose taken    CALCIUM + D3 PO        Refills:  0        Magnesium 80 MG Tabs        Refills:  0        NEW MED        VitaPulse   Refills:  0        OMEGA 3 PO        Take by mouth daily   Refills:  0                Procedures and tests performed during your visit     Basic metabolic panel    CBC with platelets differential    CT Head w/o Contrast      Orders Needing Specimen Collection     None      Pending Results     Date and Time Order Name Status Description    8/27/2018 1134 CT Head w/o Contrast Preliminary             Pending Culture Results     No orders found from 8/25/2018 to 8/28/2018.            Pending Results Instructions     If you had any lab results that were not finalized at the time of your Discharge, you can call the ED Lab Result RN at [phone]. You will be contacted by this team for any positive Lab results or changes in treatment. The nurses are available 7 days a week from 10A to 6:30P.  You can leave a message 24 hours per day and they will return your call.        Thank you for choosing [facility]       Thank you for choosing [facility] for your care. Our goal is always to provide you with excellent care. Hearing back from our patients is one way we can continue to improve our services. Please take a few minutes to complete the written survey that you may receive in the mail after you visit with us. Thank you!        [facility] Information     [employer] lets you send messages to your doctor, view your test results, renew your prescriptions, schedule appointments and more. To sign up, go to [email or web address] . Click on \"Log in\" on the left side of the screen, which will take you to the Welcome page. Then click on \"Sign up Now\" on the right side of the page.     You will be asked to enter the " access code listed below, as well as some personal information. Please follow the directions to create your username and password.     Your access code is: [email or web address]  Expires: 10/16/2018 11:02 AM     Your access code will  in 90 days. If you need help or a new code, please call your [facility] clinic or [phone].        Care EveryWhere ID     This is your Care EveryWhere ID. This could be used by other organizations to access your [facility] medical records  [id number]        Equal Access to Services     [facility]: Hadgloria Billy, washailesh wiggins, qapop kaalmaann marie wolf, keke woodward . So [facility] [phone].    ATENCIÓN: Si habla español, tiene a hernandez disposición servicios gratuitos de asistencia lingüística. Llame al [phone].    We comply with applicable federal civil rights laws and [place] laws. We do not discriminate on the basis of race, color, national origin, age, disability, sex, sexual orientation, or gender identity.            After Visit Summary       This is your record. Keep this with you and show to your community pharmacist(s) and doctor(s) at your next visit.

## 2018-08-27 NOTE — ED PROVIDER NOTES
"  History     Chief Complaint   Patient presents with     Hypertension     The history is provided by the patient.     Stacie Hair is a 63 year old female who presents to the emergency department for hypertension. Patient reports having elevated blood pressure over the weekend. She states she had a throbbing headache with high blood pressure yesterday. Patient reports taking one of her husbands Clonidine yesterday and her blood pressure did come down. She states her blood pressure was \"all over the place\" this morning, 175/116, then it went down to 142/90. She states she still has a headache, however, it is \"not as bad\". Patient states she did not take any medications for her headache. She states she is used to getting headaches, she had a history of migraines. Patient denies any fever, chest pain, shortness of breath, speech problems, vision problems, and states her arms and legs are working fine.    Cranial nerve 2-12 intact        Problem List:    Patient Active Problem List    Diagnosis Date Noted     Elevated blood pressure 2013     Priority: Medium     Family history of coronary artery disease 2011     Priority: Medium     CARDIOVASCULAR SCREENING; LDL GOAL LESS THAN 160 10/31/2010     Priority: Medium     Female stress incontinence 10/24/2002     Priority: Medium        Past Medical History:    Past Medical History:   Diagnosis Date     Herniated discs      Myalgia and myositis, unspecified        Past Surgical History:    Past Surgical History:   Procedure Laterality Date     C  DELIVERY ONLY      , Transfusion     C LIGATE FALLOPIAN TUBE,POSTPARTUM      Tubal Ligation     COLONOSCOPY  2011    Procedure:COMBINED COLONOSCOPY, SINGLE BIOPSY/POLYPECTOMY BY BIOPSY; Colonoscopy, with polypectomy by biopsy; Surgeon:MADELEINE MONROY; Location: GI     HC EXCISION BREAST LESION, OPEN >=1      benign, age 20       Family History:    Family History   Problem Relation " Age of Onset     Hypertension Mother      HEART DISEASE Father      MI       Social History:  Marital Status:   [2]  Social History   Substance Use Topics     Smoking status: Former Smoker     Smokeless tobacco: Never Used      Comment: 1980-quit     Alcohol use 0.0 oz/week     0 Standard drinks or equivalent per week      Comment: occasional        Medications:      Calcium Carb-Cholecalciferol (CALCIUM + D3 PO)   Magnesium 80 MG TABS   NEW MED   Omega-3 Fatty Acids (OMEGA 3 PO)         Review of Systems   Gastrointestinal: Positive for diarrhea (patient had cellulitis on her right foot, given an antibiotic for this and since then has had loose stools on and off).   Musculoskeletal:        Was given an antibiotic for cellulitis on her right foot, right foot looks good, cellulitis cleared up.   All other systems reviewed and are negative.      Physical Exam   BP: (!) 186/105  Pulse: 78  Temp: 97.8  F (36.6  C)  Resp: 16  Weight: 74.8 kg (165 lb)  SpO2: 94 %      Physical Exam   Constitutional: She is oriented to person, place, and time. She appears well-developed and well-nourished. No distress.   HENT:   Head: Normocephalic and atraumatic.   Mouth/Throat: Oropharynx is clear and moist. No oropharyngeal exudate.   Eyes: Pupils are equal, round, and reactive to light. No scleral icterus.   Neck: Normal range of motion. Neck supple.   Cardiovascular: Normal rate, regular rhythm, normal heart sounds and intact distal pulses.    Pulmonary/Chest: Effort normal and breath sounds normal. No respiratory distress.   Abdominal: Soft. Bowel sounds are normal. There is no tenderness.   Musculoskeletal: Normal range of motion. She exhibits no edema or tenderness.   Neurological: She is alert and oriented to person, place, and time. No cranial nerve deficit.   No pronator drift.  Lower extremities move normal   Skin: Skin is warm. No rash noted. She is not diaphoretic.   Nursing note and vitals reviewed.      ED Course      ED Course     Procedures               Critical Care time:  none               Results for orders placed or performed during the hospital encounter of 08/27/18 (from the past 24 hour(s))   CBC with platelets differential   Result Value Ref Range    WBC 6.2 4.0 - 11.0 10e9/L    RBC Count 4.66 3.8 - 5.2 10e12/L    Hemoglobin 13.9 11.7 - 15.7 g/dL    Hematocrit 42.6 35.0 - 47.0 %    MCV 91 78 - 100 fl    MCH 29.8 26.5 - 33.0 pg    MCHC 32.6 31.5 - 36.5 g/dL    RDW 13.6 10.0 - 15.0 %    Platelet Count 274 150 - 450 10e9/L    Diff Method Automated Method     % Neutrophils 62.2 %    % Lymphocytes 26.5 %    % Monocytes 7.1 %    % Eosinophils 3.5 %    % Basophils 0.5 %    % Immature Granulocytes 0.2 %    Nucleated RBCs 0 0 /100    Absolute Neutrophil 3.9 1.6 - 8.3 10e9/L    Absolute Lymphocytes 1.7 0.8 - 5.3 10e9/L    Absolute Monocytes 0.4 0.0 - 1.3 10e9/L    Absolute Basophils 0.0 0.0 - 0.2 10e9/L    Abs Immature Granulocytes 0.0 0 - 0.4 10e9/L    Absolute Nucleated RBC 0.0    Basic metabolic panel   Result Value Ref Range    Sodium 141 133 - 144 mmol/L    Potassium 4.1 3.4 - 5.3 mmol/L    Chloride 107 94 - 109 mmol/L    Carbon Dioxide 27 20 - 32 mmol/L    Anion Gap 7 3 - 14 mmol/L    Glucose 94 70 - 99 mg/dL    Urea Nitrogen 11 7 - 30 mg/dL    Creatinine 0.72 0.52 - 1.04 mg/dL    GFR Estimate 83 >60 mL/min/1.7m2    GFR Estimate If Black >90 >60 mL/min/1.7m2    Calcium 8.8 8.5 - 10.1 mg/dL   CT Head w/o Contrast    Narrative    CT SCAN OF THE HEAD WITHOUT CONTRAST   8/27/2018 11:53 AM     HISTORY:  Headache.     TECHNIQUE:  Axial images of the head and coronal reformations without  IV contrast material. Radiation dose for this scan was reduced using  automated exposure control, adjustment of the mA and/or kV according  to patient size, or iterative reconstruction technique.    COMPARISON: None.    FINDINGS: There is no evidence of intracranial hemorrhage, mass, acute  infarct or anomaly. The ventricles are normal in  size, shape and  configuration. The brain parenchyma and subarachnoid spaces are  normal.     The visualized portions of the sinuses and mastoids appear normal. The  bony calvarium and bones of the skull base appear intact.       Impression    IMPRESSION:  Normal CT scan of the head.     VINCE MCFARLAND MD       Medications - No data to display    Assessments & Plan (with Medical Decision Making)  63-year-old female presented for evaluation of hypertension.  She has not previously been treated for this.  Blood pressure before discharge return to the 180s over 90s.  No acute findings.  She has had no chest pain.  She did have a headache but this is somewhat normal for her.  Neurological exam normal.  Head CT normal.  Have recommended she keep a log of her blood pressures and follow-up with her primary physician, Dr. Mejia within the next 2 weeks.       I have reviewed the nursing notes.    I have reviewed the findings, diagnosis, plan and need for follow up with the patient.       Discharge Medication List as of 8/27/2018 12:54 PM          Final diagnoses:   Hypertension, unspecified type     This document serves as a record of services personally performed by Jack Phoenix MD. It was created on their behalf by Megan Rodriguez, a trained medical scribe. The creation of this record is based on the provider's personal observations and the statements of the patient. This document has been checked and approved by the attending provider.    Note: Chart documentation done in part with Dragon Voice Recognition software. Although reviewed after completion, some word and grammatical errors may remain.    8/27/2018   [facility] EMERGENCY DEPARTMENT     Jack Phoenix MD  08/27/18 [id number]

## 2018-08-27 NOTE — TELEPHONE ENCOUNTER
Stacie Hair is a 63 year old female who calls with concerns of hypertension.    NURSING ASSESSMENT:  Description:  Headache and high blood pressure over the weekend. Today it is 176/114.  Onset/duration:  weekend  Precip. factors:  none  Associated symptoms:  Denies vision changes, cp, n/v.  Improves/worsens symptoms:  none  Pain scale (0-10)   0/10  LMP/preg/breast feeding:    Last exam/Treatment:     Allergies:   Allergies   Allergen Reactions     Codeine      Eggs [Eggs]      Milk Products      Nsaids      Intolerance         RECOMMENDED DISPOSITION:  To ED, another person to drive   Will comply with recommendation: Yes  If further questions/concerns or if symptoms do not improve, worsen or new symptoms develop, call your PCP or [facility] as soon as possible.      Guideline used: hypertension  Telephone Triage Protocols for Nurses, Fifth Edition, Leila Novoa RN

## 2018-09-07 ENCOUNTER — OFFICE VISIT (OUTPATIENT)
Dept: FAMILY MEDICINE | Facility: CLINIC | Age: 63
End: 2018-09-07
Payer: COMMERCIAL

## 2018-09-07 VITALS
SYSTOLIC BLOOD PRESSURE: 164 MMHG | TEMPERATURE: 97.4 F | HEIGHT: 62 IN | HEART RATE: 90 BPM | OXYGEN SATURATION: 96 % | RESPIRATION RATE: 16 BRPM | DIASTOLIC BLOOD PRESSURE: 104 MMHG | BODY MASS INDEX: 31.1 KG/M2 | WEIGHT: 169 LBS

## 2018-09-07 DIAGNOSIS — I10 BENIGN ESSENTIAL HYPERTENSION: Primary | ICD-10-CM

## 2018-09-07 DIAGNOSIS — E78.5 HYPERLIPIDEMIA LDL GOAL <130: ICD-10-CM

## 2018-09-07 PROCEDURE — 99214 OFFICE O/P EST MOD 30 MIN: CPT | Performed by: FAMILY MEDICINE

## 2018-09-07 RX ORDER — HYDROCHLOROTHIAZIDE 25 MG/1
25 TABLET ORAL DAILY
Qty: 30 TABLET | Refills: 1 | Status: SHIPPED | OUTPATIENT
Start: 2018-09-07 | End: 2018-11-21

## 2018-09-07 ASSESSMENT — PAIN SCALES - GENERAL: PAINLEVEL: MODERATE PAIN (5)

## 2018-09-07 NOTE — PROGRESS NOTES
"  SUBJECTIVE:   Stacie Hair is a 63 year old female who presents to clinic today for the following health issues:      ED/UC Followup:    Facility:  [facility]  Date of visit: 8/27/18  Reason for visit: Hypertension  Current Status: Follow up       Was in the ER about 10 days ago with elevated BP that was quite high in association with a headache.  She was evaluated and found to have her pressures come down, labs done showed normal renal function and electrolytes as well as cbc.  She was not started on any antihypertensive but was asked to monitor pressures and bring this in to a visit with me in a couple of weeks.  She notes that her pressures have been high, running 150-160/90s as well at home.  She still has a slight headache but states she has had these all her life and doesn't feel these are unusual. There are no strokes in her family but she does have siblings and parents with heart disease and high blood pressures.  Her lipids have been moderately elevated in the past and is not on any treatment for these, which we have discussed but she doesn't like to take pills.     Current Outpatient Prescriptions   Medication Sig Dispense Refill     Calcium Carb-Cholecalciferol (CALCIUM + D3 PO)        Magnesium 80 MG TABS        NEW MED VitaPulse       Omega-3 Fatty Acids (OMEGA 3 PO) Take by mouth daily       ROS:  Const: frustrated she can't lose weight.   HEENT: no visual changes   CVR: no chest pains, palpitations  RESP; no dyspnea  GI: neg  : neg  NEURO: headaches as noted, no focal deficits.    OBJECTIVE:  BP (!) 164/104  Pulse 90  Temp 97.4  F (36.3  C) (Temporal)  Resp 16  Ht 5' 1.75\" (1.568 m)  Wt 169 lb (76.7 kg)  LMP 05/13/2004  SpO2 96%  BMI 31.16 kg/m2  Alert and oriented, in no acute distress.  Lungs clear, heart is regular.      1/25/17  Lab Results   Component Value Date    CHOL 259 01/25/2017       01/25/2017      HDL 46 01/25/2017      TRIG 181 01/25/2017 8/27/18  GFR: " 83  Potassium: 4.1  G  HGB: 13.9    ASSESSMENT:     Benign essential hypertension  Hyperlipidemia LDL goal <130    PLAN:  Discussed that in review of recent pressures and BP today that she does have a diagnosis of hypertension. We reviewed diet and there is room to reduce/eliminate caffeine but already uses a low salt diet.  We discussed Mediterranean diet for improving lipids and she will look into that but the diet she describes is well in that direction with fruits/veggies, limited red meats, etc.  We also reviewed her CVR risk score using current metrics for BP and lipids and was currently at 8.5% with the greatest benefit being in management of her systolic bp down to values in the 120s (3.5% drop) vs lowering LDL to less than 100 (0.8% drop). Therefore, she did agree to initiate antihypertensive treatment.  As she has no other co-morbidities such as DM, palpitations, etc for which we would customize initial treatment, will start her on 12.5 mg hydrochlorothiazide for 3-4 days, then a full 25 mg if tolerated without hypotension.  She will add high potassium foods to her diet and will need to see a follow up of blood pressures in about two weeks to see how she is adjusting to the medication. She is aware that it is common over the lifetime of a person with hypertension to ultimately require 3 or more meds to keep things under control.  Side effects and need for potassium monitoring discussed. She will focus on diet to manage her lipids for now. We also talked about weight reduction and types of exercise to increase muscle mass/metabolic rate along with cardio work to improve fitness and  burn calories.      Electronically signed by Greg Schoen, MD      Electronically signed by Greg Schoen, MD

## 2018-09-07 NOTE — MR AVS SNAPSHOT
"              After Visit Summary   9/7/2018    Stacie Hair    MRN: [id number]           Patient Information     Date Of Birth          1955        Visit Information        Provider Department      9/7/2018 3:10 PM Schoen, Gregory G, MD [facility]        Today's Diagnoses     Benign essential hypertension    -  1    Hyperlipidemia LDL goal <130           Follow-ups after your visit        Your next 10 appointments already scheduled     Sep 11, 2018  8:30 AM CDT   (Arrive by 8:15 AM)   MA SCREENING DIGITAL BILATERAL with PHMA1   [facility] ([facility])    [address] [place] [id number] [phone]           Do not use any powder, lotion or deodorant under your arms or on your breast. If you do, we will ask you to remove it before your exam.  Wear comfortable, two-piece clothing.  If you have any allergies, tell your care team.  Bring any previous mammograms from other facilities or have them mailed to the breast center. Three-dimensional (3D) mammograms are available at [place] locations in [facility], [facility], [facility], and [place]. [facility] locations include [place] [facility] in [place]. Benefits of 3D mammograms include: - Improved rate of cancer detection - Decreases your chance of having to go back for more tests, which means fewer: - \"False-positive\" results (This means that there is an abnormal area but it isn't cancer.) - Invasive testing procedures, such as a biopsy or surgery - Can provide clearer images of the breast if you have dense breast tissue. 3D mammography is an optional exam that anyone can have with a 2D mammogram. It doesn't replace or take the place of a 2D mammogram. 2D mammograms remain an effective screening test for all women.  Not all insurance companies cover the cost of a 3D mammogram. Check with your insurance.              Future tests that " "were ordered for you today     Open Future Orders        Priority Expected Expires Ordered    MA Screening Digital Bilateral Routine  2019            Who to contact     If you have questions or need follow up information about today's clinic visit or your schedule please contact [facility] directly at [phone].  Normal or non-critical lab and imaging results will be communicated to you by MyChart, letter or phone within 4 business days after the clinic has received the results. If you do not hear from us within 7 days, please contact the clinic through MyChart or phone. If you have a critical or abnormal lab result, we will notify you by phone as soon as possible.  Submit refill requests through [employer] or call your pharmacy and they will forward the refill request to us. Please allow 3 business days for your refill to be completed.          Additional Information About Your Visit        MyChart Information     [employer] lets you send messages to your doctor, view your test results, renew your prescriptions, schedule appointments and more. To sign up, go to [email or web address] . Click on \"Log in\" on the left side of the screen, which will take you to the Welcome page. Then click on \"Sign up Now\" on the right side of the page.     You will be asked to enter the access code listed below, as well as some personal information. Please follow the directions to create your username and password.     Your access code is: [email or web address]  Expires: 2018  3:03 PM     Your access code will  in 90 days. If you need help or a new code, please call your [facility] or [phone].        Care EveryWhere ID     This is your Care EveryWhere ID. This could be used by other organizations to access your [place] medical records  [id number]        Your Vitals Were     Pulse Temperature Respirations Height Last Period Pulse Oximetry    90 97.4  F (36.3  C) (Temporal) 16 5' 1.75\" (1.568 m) " 05/13/2004 96%    BMI (Body Mass Index)                   31.16 kg/m2            Blood Pressure from Last 3 Encounters:   09/07/18 (!) 164/104   08/27/18 (!) 188/97   07/18/18 (!) 165/98    Weight from Last 3 Encounters:   09/07/18 169 lb (76.7 kg)   08/27/18 165 lb (74.8 kg)   01/30/17 166 lb (75.3 kg)              Today, you had the following     No orders found for display         Today's Medication Changes          These changes are accurate as of 9/7/18 11:59 PM.  If you have any questions, ask your nurse or doctor.               Start taking these medicines.        Dose/Directions    hydrochlorothiazide 25 MG tablet   Commonly known as:  HYDRODIURIL   Used for:  Benign essential hypertension   Started by:  Schoen, Gregory G, MD        Dose:  25 mg   Take 1 tablet (25 mg) by mouth daily   Quantity:  30 tablet   Refills:  1            Where to get your medicines      These medications were sent to [facility], [address]   [facility] [address]     Phone:  [phone]     hydrochlorothiazide 25 MG tablet                Primary Care Provider Office Phone # Fax #    Gregory G Schoen, [phone]       [address] [facility] [address]        Equal Access to Services     [facility] AH: Hadii ani ku hadasho Soomaali, waaxda luqadaha, qaybta kaalmada adeegyada, waxay angelica haynicko rios. So [facility] [phone].    ATENCIÓN: Si habla español, tiene a hernandez disposición servicios gratuitos de asistencia lingüística. Llame al [phone].    We comply with applicable federal civil rights laws and [place] laws. We do not discriminate on the basis of race, color, national origin, age, disability, sex, sexual orientation, or gender identity.            Thank you!     Thank you for choosing [facility]  for your care. Our goal is always to provide you with excellent care. Hearing back from our patients is one way we  can continue to improve our services. Please take a few minutes to complete the written survey that you may receive in the mail after your visit with us. Thank you!             Your Updated Medication List - Protect others around you: Learn how to safely use, store and throw away your medicines at www.disposemymeds.org.          This list is accurate as of 9/7/18 11:59 PM.  Always use your most recent med list.                   Brand Name Dispense Instructions for use Diagnosis    CALCIUM + D3 PO           hydrochlorothiazide 25 MG tablet    HYDRODIURIL    30 tablet    Take 1 tablet (25 mg) by mouth daily    Benign essential hypertension       Magnesium 80 MG Tabs           NEW MED      VitaPulse        OMEGA 3 PO      Take by mouth daily

## 2018-09-08 PROBLEM — E78.5 HYPERLIPIDEMIA LDL GOAL <130: Status: ACTIVE | Noted: 2018-09-08

## 2018-09-11 ENCOUNTER — HOSPITAL ENCOUNTER (OUTPATIENT)
Dept: MAMMOGRAPHY | Facility: CLINIC | Age: 63
Discharge: HOME | End: 2018-09-11
Attending: FAMILY MEDICINE | Admitting: FAMILY MEDICINE
Payer: COMMERCIAL

## 2018-09-11 DIAGNOSIS — Z12.31 VISIT FOR SCREENING MAMMOGRAM: ICD-10-CM

## 2018-09-11 PROCEDURE — 77067 SCR MAMMO BI INCL CAD: CPT

## 2018-11-21 DIAGNOSIS — I10 BENIGN ESSENTIAL HYPERTENSION: ICD-10-CM

## 2018-11-21 RX ORDER — HYDROCHLOROTHIAZIDE 25 MG/1
TABLET ORAL
Qty: 30 TABLET | Refills: 1 | Status: SHIPPED | OUTPATIENT
Start: 2018-11-21 | End: 2019-04-30

## 2018-11-21 NOTE — TELEPHONE ENCOUNTER
"Hydrochlorothiazide  Last Written Prescription Date:  09/07/2018  Last Fill Quantity: 30,  # refills: 1   Last office visit: 9/7/2018 with prescribing provider:  Schoen   Future Office Visit:  None  Routing refill request to provider for review/approval because:  Elevated blood pressures.     Requested Prescriptions   Pending Prescriptions Disp Refills     hydrochlorothiazide (HYDRODIURIL) 25 MG tablet [Pharmacy Med Name: HYDROCHLOROTHIAZIDE 25MG TABS] 30 tablet 1     Sig: TAKE ONE TABLET BY MOUTH EVERY DAY    Diuretics (Including Combos) Protocol Failed    11/21/2018 12:25 PM       Failed - Blood pressure under 140/90 in past 12 months    BP Readings from Last 3 Encounters:   09/07/18 (!) 164/104   08/27/18 (!) 188/97   07/18/18 (!) 165/98          Passed - Recent (12 mo) or future (30 days) visit within the authorizing provider's specialty    Patient had office visit in the last 12 months or has a visit in the next 30 days with authorizing provider or within the authorizing provider's specialty.  See \"Patient Info\" tab in inbasket, or \"Choose Columns\" in Meds & Orders section of the refill encounter.         Passed - Patient is age 18 or older       Passed - No active pregancy on record       Passed - Normal serum creatinine on file in past 12 months    Recent Labs   Lab Test  08/27/18   1143   CR  0.72          Passed - Normal serum potassium on file in past 12 months    Recent Labs   Lab Test  08/27/18   1143   POTASSIUM  4.1          Passed - Normal serum sodium on file in past 12 months    Recent Labs   Lab Test  08/27/18   1143   NA  141          Passed - No positive pregnancy test in past 12 months        "

## 2019-04-30 DIAGNOSIS — I10 BENIGN ESSENTIAL HYPERTENSION: ICD-10-CM

## 2019-05-01 RX ORDER — HYDROCHLOROTHIAZIDE 25 MG/1
TABLET ORAL
Qty: 30 TABLET | Refills: 1 | Status: SHIPPED | OUTPATIENT
Start: 2019-05-01 | End: 2019-09-10

## 2019-05-01 NOTE — TELEPHONE ENCOUNTER
"Hydrochlorothiazide  Last Written Prescription Date:  11/21/2018  Last Fill Quantity: 30,  # refills: 1   Last office visit: 9/7/2018 with prescribing provider:  Schoen   Future Office Visit:  None  Routing refill request to provider for review/approval because:  Blood pressures elevated above goal.     Requested Prescriptions   Pending Prescriptions Disp Refills     hydrochlorothiazide (HYDRODIURIL) 25 MG tablet [Pharmacy Med Name: HYDROCHLOROTHIAZIDE 25MG TABS] 30 tablet 1     Sig: TAKE ONE TABLET BY MOUTH EVERY DAY       Diuretics (Including Combos) Protocol Failed - 4/30/2019 12:49 PM        Failed - Blood pressure under 140/90 in past 12 months     BP Readings from Last 3 Encounters:   09/07/18 (!) 164/104   08/27/18 (!) 188/97   07/18/18 (!) 165/98           Passed - Recent (12 mo) or future (30 days) visit within the authorizing provider's specialty     Patient had office visit in the last 12 months or has a visit in the next 30 days with authorizing provider or within the authorizing provider's specialty.  See \"Patient Info\" tab in inbasket, or \"Choose Columns\" in Meds & Orders section of the refill encounter.          Passed - Medication is active on med list        Passed - Patient is age 18 or older        Passed - No active pregancy on record        Passed - Normal serum creatinine on file in past 12 months     Recent Labs   Lab Test 08/27/18  1143   CR 0.72           Passed - Normal serum potassium on file in past 12 months     Recent Labs   Lab Test 08/27/18  1143   POTASSIUM 4.1           Passed - Normal serum sodium on file in past 12 months     Recent Labs   Lab Test 08/27/18  1143              Passed - No positive pregnancy test in past 12 months      Alea Allison RN   "

## 2019-09-10 DIAGNOSIS — I10 BENIGN ESSENTIAL HYPERTENSION: ICD-10-CM

## 2019-09-10 RX ORDER — HYDROCHLOROTHIAZIDE 25 MG/1
TABLET ORAL
Qty: 30 TABLET | Refills: 3 | Status: SHIPPED | OUTPATIENT
Start: 2019-09-10 | End: 2020-04-22

## 2020-02-11 ENCOUNTER — HOSPITAL ENCOUNTER (OUTPATIENT)
Dept: GENERAL RADIOLOGY | Facility: CLINIC | Age: 65
End: 2020-02-11
Attending: FAMILY MEDICINE
Payer: COMMERCIAL

## 2020-02-11 ENCOUNTER — OFFICE VISIT (OUTPATIENT)
Dept: FAMILY MEDICINE | Facility: CLINIC | Age: 65
End: 2020-02-11
Payer: COMMERCIAL

## 2020-02-11 VITALS
OXYGEN SATURATION: 96 % | BODY MASS INDEX: 31.65 KG/M2 | TEMPERATURE: 97.9 F | HEIGHT: 62 IN | DIASTOLIC BLOOD PRESSURE: 92 MMHG | WEIGHT: 172 LBS | HEART RATE: 89 BPM | RESPIRATION RATE: 18 BRPM | SYSTOLIC BLOOD PRESSURE: 144 MMHG

## 2020-02-11 DIAGNOSIS — M25.512 LEFT SHOULDER PAIN, UNSPECIFIED CHRONICITY: Primary | ICD-10-CM

## 2020-02-11 DIAGNOSIS — M54.6 MIDLINE THORACIC BACK PAIN, UNSPECIFIED CHRONICITY: ICD-10-CM

## 2020-02-11 DIAGNOSIS — M89.8X9 BONE PAIN: ICD-10-CM

## 2020-02-11 DIAGNOSIS — M25.512 LEFT SHOULDER PAIN, UNSPECIFIED CHRONICITY: ICD-10-CM

## 2020-02-11 PROCEDURE — 99213 OFFICE O/P EST LOW 20 MIN: CPT | Performed by: FAMILY MEDICINE

## 2020-02-11 PROCEDURE — 72072 X-RAY EXAM THORAC SPINE 3VWS: CPT | Mod: TC

## 2020-02-11 PROCEDURE — 73030 X-RAY EXAM OF SHOULDER: CPT | Mod: TC

## 2020-02-11 ASSESSMENT — MIFFLIN-ST. JEOR: SCORE: 1288.44

## 2020-02-11 ASSESSMENT — PAIN SCALES - GENERAL: PAINLEVEL: MILD PAIN (2)

## 2020-02-11 NOTE — PROGRESS NOTES
"Subjective     Stacie Hair is a 64 year old female who presents to clinic today for the following health issues:    HPI   Fell a couple of feet out of a hammock last summer and landed on her right posterior shoulder area and denies hitting head or neck.  No significant initial injury that prompted anything other than some OTC meds and did not seek care.  Over the summer things did not improve but she was active and felt that maybe her activity was prolonging recovery and eventually things would get better if she stopped throwing frisbees, doing yardwork, etc.  However, as the winter has progressed, things have not improved and her range of motion has decreased and she has some weakness.  She does have some pain in the posterior shoulder area and T-spine area with activity as well. No issue with neck pain or headaches.  She is left hand dominant so this is limiting her activity.       Objective    BP (!) 144/92 (Cuff Size: Adult Regular)   Pulse 89   Temp 97.9  F (36.6  C) (Temporal)   Resp 18   Ht 1.575 m (5' 2\")   Wt 78 kg (172 lb)   LMP 05/13/2004   SpO2 96%   BMI 31.46 kg/m    Body mass index is 31.46 kg/m .  Physical Exam   Alert, in no acute distress.    She has equal grasp of both hands with 5/5 strength of the hands, wrists and elbows.  She has clear decrease in strength with abduction at 4/5 at the shoulder as well as weakness with pushing forward with elbows flexed and pushing out on the left.  She does have full ROM but there is weakness that makes it hard to hold her hand above the head. Her internal and external shoulder rotation is limited as well.    Xrays of the shoulder and Tspine show the shoulder to be essentially normal and mild degenerative T-spine changes.  Limited views of the lower Cspine on these films is also unremarkable.     ASSESSMENT:     Left shoulder pain, unspecified chronicity  Midline thoracic back pain, unspecified chronicity  Bone pain    PLAN:  She has definite " weakness and decreased internal/external rotation and abduction/flexion because of this. This may be from lack of use but there is no evidence of frozen shoulder. There was no palpable focal tenderness.  She may have a rotator cuff tear which would be chronic at this point and best approach would be PT. Patient agrees to this and referral is made. She would also like to proceed with a DEXA scan and this was ordered as well. Will see back in 4 weeks.    Electronically signed by Greg Schoen, MD

## 2020-02-21 ENCOUNTER — HOSPITAL ENCOUNTER (OUTPATIENT)
Dept: PHYSICAL THERAPY | Facility: CLINIC | Age: 65
Setting detail: THERAPIES SERIES
End: 2020-02-21
Attending: FAMILY MEDICINE
Payer: COMMERCIAL

## 2020-02-21 DIAGNOSIS — M25.512 LEFT SHOULDER PAIN, UNSPECIFIED CHRONICITY: ICD-10-CM

## 2020-02-21 DIAGNOSIS — M54.6 MIDLINE THORACIC BACK PAIN, UNSPECIFIED CHRONICITY: ICD-10-CM

## 2020-02-21 PROCEDURE — 97162 PT EVAL MOD COMPLEX 30 MIN: CPT | Mod: GP

## 2020-02-21 PROCEDURE — 97110 THERAPEUTIC EXERCISES: CPT | Mod: GP
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
## 2020-02-26 ENCOUNTER — HOSPITAL ENCOUNTER (OUTPATIENT)
Dept: PHYSICAL THERAPY | Facility: CLINIC | Age: 65
Setting detail: THERAPIES SERIES
End: 2020-02-26
Attending: FAMILY MEDICINE
Payer: COMMERCIAL

## 2020-02-26 PROCEDURE — 97110 THERAPEUTIC EXERCISES: CPT | Mod: GP

## 2020-02-26 PROCEDURE — 97012 MECHANICAL TRACTION THERAPY: CPT | Mod: GP

## 2020-02-27 ENCOUNTER — HOSPITAL ENCOUNTER (OUTPATIENT)
Dept: PHYSICAL THERAPY | Facility: CLINIC | Age: 65
Setting detail: THERAPIES SERIES
End: 2020-02-27
Attending: FAMILY MEDICINE
Payer: COMMERCIAL

## 2020-02-27 PROCEDURE — 97110 THERAPEUTIC EXERCISES: CPT | Mod: GP

## 2020-02-27 PROCEDURE — 97140 MANUAL THERAPY 1/> REGIONS: CPT | Mod: GP

## 2020-03-03 ENCOUNTER — HOSPITAL ENCOUNTER (OUTPATIENT)
Dept: PHYSICAL THERAPY | Facility: CLINIC | Age: 65
Setting detail: THERAPIES SERIES
End: 2020-03-03
Attending: FAMILY MEDICINE
Payer: COMMERCIAL

## 2020-03-03 PROCEDURE — 97110 THERAPEUTIC EXERCISES: CPT | Mod: GP

## 2020-03-03 PROCEDURE — 97140 MANUAL THERAPY 1/> REGIONS: CPT | Mod: GP

## 2020-03-06 ENCOUNTER — HOSPITAL ENCOUNTER (OUTPATIENT)
Dept: PHYSICAL THERAPY | Facility: CLINIC | Age: 65
Setting detail: THERAPIES SERIES
End: 2020-03-06
Attending: FAMILY MEDICINE
Payer: COMMERCIAL

## 2020-03-06 PROCEDURE — 97110 THERAPEUTIC EXERCISES: CPT | Mod: GP

## 2020-03-06 PROCEDURE — 97140 MANUAL THERAPY 1/> REGIONS: CPT | Mod: GP

## 2020-03-10 ENCOUNTER — HOSPITAL ENCOUNTER (OUTPATIENT)
Dept: PHYSICAL THERAPY | Facility: CLINIC | Age: 65
Setting detail: THERAPIES SERIES
End: 2020-03-10
Attending: FAMILY MEDICINE
Payer: COMMERCIAL

## 2020-03-10 PROCEDURE — 97110 THERAPEUTIC EXERCISES: CPT | Mod: GP

## 2020-03-10 PROCEDURE — 97140 MANUAL THERAPY 1/> REGIONS: CPT | Mod: GP

## 2020-03-12 ENCOUNTER — HOSPITAL ENCOUNTER (OUTPATIENT)
Dept: PHYSICAL THERAPY | Facility: CLINIC | Age: 65
Setting detail: THERAPIES SERIES
End: 2020-03-12
Attending: FAMILY MEDICINE
Payer: COMMERCIAL

## 2020-03-12 PROCEDURE — 97140 MANUAL THERAPY 1/> REGIONS: CPT | Mod: GP

## 2020-03-12 PROCEDURE — 97110 THERAPEUTIC EXERCISES: CPT | Mod: GP

## 2020-03-17 ENCOUNTER — HOSPITAL ENCOUNTER (OUTPATIENT)
Dept: PHYSICAL THERAPY | Facility: CLINIC | Age: 65
Setting detail: THERAPIES SERIES
End: 2020-03-17
Attending: FAMILY MEDICINE
Payer: COMMERCIAL

## 2020-03-17 PROCEDURE — 97110 THERAPEUTIC EXERCISES: CPT | Mod: GP

## 2020-03-17 PROCEDURE — 97140 MANUAL THERAPY 1/> REGIONS: CPT | Mod: GP

## 2020-04-20 DIAGNOSIS — I10 BENIGN ESSENTIAL HYPERTENSION: ICD-10-CM

## 2020-04-21 NOTE — TELEPHONE ENCOUNTER
"Hydrochlorothiazide  Last Written Prescription Date:  09/10/2019  Last Fill Quantity: 30,  # refills: 0   Last office visit: 2/11/2020 with prescribing provider:  Schoen   Future Office Visit:  None  Routing refill request to provider for review/approval because:  Labs not current:  Creatinine, potassium, sodium  Elevated blood pressures.     Requested Prescriptions   Pending Prescriptions Disp Refills     hydrochlorothiazide (HYDRODIURIL) 25 MG tablet [Pharmacy Med Name: HYDROCHLOROTHIAZIDE 25MG TABS] 30 tablet 3     Sig: TAKE ONE TABLET BY MOUTH ONCE DAILY       Diuretics (Including Combos) Protocol Failed - 4/20/2020 12:12 PM        Failed - Blood pressure under 140/90 in past 12 months     BP Readings from Last 3 Encounters:   02/11/20 (!) 144/92   09/07/18 (!) 164/104   08/27/18 (!) 188/97           Failed - Normal serum creatinine on file in past 12 months     Recent Labs   Lab Test 08/27/18  1143   CR 0.72           Failed - Normal serum potassium on file in past 12 months     Recent Labs   Lab Test 08/27/18  1143   POTASSIUM 4.1           Failed - Normal serum sodium on file in past 12 months     Recent Labs   Lab Test 08/27/18  1143              Passed - Recent (12 mo) or future (30 days) visit within the authorizing provider's specialty     Patient has had an office visit with the authorizing provider or a provider within the authorizing providers department within the previous 12 mos or has a future within next 30 days. See \"Patient Info\" tab in inbasket, or \"Choose Columns\" in Meds & Orders section of the refill encounter.          Passed - Medication is active on med list        Passed - Patient is age 18 or older        Passed - No active pregancy on record        Passed - No positive pregnancy test in past 12 months         Alea Allison RN   "

## 2020-04-22 RX ORDER — HYDROCHLOROTHIAZIDE 25 MG/1
TABLET ORAL
Qty: 30 TABLET | Refills: 3 | Status: SHIPPED | OUTPATIENT
Start: 2020-04-22 | End: 2020-09-22

## 2020-05-13 ENCOUNTER — VIRTUAL VISIT (OUTPATIENT)
Dept: FAMILY MEDICINE | Facility: CLINIC | Age: 65
End: 2020-05-13
Payer: COMMERCIAL

## 2020-05-13 DIAGNOSIS — R42 DIZZINESS: Primary | ICD-10-CM

## 2020-05-13 PROCEDURE — 99214 OFFICE O/P EST MOD 30 MIN: CPT | Mod: 95 | Performed by: NURSE PRACTITIONER

## 2020-05-13 RX ORDER — METHYLPREDNISOLONE 4 MG
TABLET, DOSE PACK ORAL
Qty: 21 TABLET | Refills: 0 | Status: SHIPPED | OUTPATIENT
Start: 2020-05-13 | End: 2020-06-22
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
## 2020-05-13 NOTE — PATIENT INSTRUCTIONS
For the dizziness:    1. Short course of steroids to address inflammation in the head and neck from overuse in the garden this last weekend.     2. PT to assess for Benign Paroxysmal positional vertigo.     Present to ED if symptoms become severe an affect balance or cause intractable vomiting.     If symptoms do not resolve in the next 2 weeks call clinic.     Narciso De Santiago CNP

## 2020-05-14 ENCOUNTER — HOSPITAL ENCOUNTER (OUTPATIENT)
Dept: PHYSICAL THERAPY | Facility: CLINIC | Age: 65
Setting detail: THERAPIES SERIES
End: 2020-05-14
Attending: NURSE PRACTITIONER
Payer: COMMERCIAL

## 2020-05-14 DIAGNOSIS — R42 DIZZINESS: ICD-10-CM

## 2020-05-14 PROCEDURE — 97161 PT EVAL LOW COMPLEX 20 MIN: CPT | Mod: GP | Performed by: PHYSICAL THERAPIST

## 2020-05-14 PROCEDURE — 95992 CANALITH REPOSITIONING PROC: CPT | Mod: GP | Performed by: PHYSICAL THERAPIST
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
## 2020-05-27 NOTE — PROGRESS NOTES
"Outpatient Physical Therapy Discharge Note     Patient: Stacie Hair  : 1955    Beginning/End Dates of Reporting Period:  2020 to 2020    Referring Provider: Gregory Schoen, MD    Therapy Diagnosis: Cervical Pain / Radiculopathy     Client Self Report: Patient reports that \"what you do to my neck\" referencing the manual therapy \"really helps my neck\".  She reports she was doing well over the weekend until  when she cleaned her house and scrubbed her house she developed a recurrance of her symptoms. The symptoms are a fatigeud/tight neck that radiates down to the shoulder.       Goals:  Goal Identifier (P) Objective Measures   Goal Description (P) Patient will improve her SNEHA, Samuel, SPADI and NDI scores to demonstrate improvement throught the entire body and at each location for overall functional progress.   Target Date (P) 04/10/20   Date Met      Progress:     Goal Identifier (P) Left Shoulder Strength   Goal Description (P) Patient will be able to perform full shoulder flexion and full shoulder abduction holding a 2# DB without pain, slow motion, poor motor control or any symptoms indicating improved strength in the left shoulder   Target Date (P) 20   Date Met      Progress:     Goal Identifier (P) Pain   Goal Description (P) Patient will no longer experience any pain in any shoulder, in her thoracic spine or in any radicular pattern past her shoulders for evidence of centralization and reduction of symptoms.   Target Date (P) 20   Date Met      Progress:     Goal Identifier     Goal Description     Target Date     Date Met      Progress:     Goal Identifier     Goal Description     Target Date     Date Met      Progress:     Goal Identifier     Goal Description     Target Date     Date Met      Progress:     Goal Identifier     Goal Description     Target Date     Date Met      Progress:     Goal Identifier     Goal Description     Target Date     Date Met      Progress: "     Progress Toward Goals:   Progress this reporting period: Patient's goals were not formally assessed. However patient communicated with the therapy via telephone and stated that her symptoms have been improving and her shoulder strengthis improving.  Patient chooses to discharge.      Plan:  Discharge from therapy.    Discharge:    Reason for Discharge: Patient chooses to discontinue therapy.    Equipment Issued: None    Discharge Plan: Patient to continue home program.

## 2020-06-10 ENCOUNTER — HOSPITAL ENCOUNTER (OUTPATIENT)
Dept: PHYSICAL THERAPY | Facility: CLINIC | Age: 65
Setting detail: THERAPIES SERIES
End: 2020-06-10
Attending: NURSE PRACTITIONER
Payer: COMMERCIAL

## 2020-06-10 PROCEDURE — 97110 THERAPEUTIC EXERCISES: CPT | Mod: GP | Performed by: PHYSICAL THERAPIST

## 2020-06-22 ENCOUNTER — VIRTUAL VISIT (OUTPATIENT)
Dept: FAMILY MEDICINE | Facility: CLINIC | Age: 65
End: 2020-06-22
Payer: COMMERCIAL

## 2020-06-22 DIAGNOSIS — H81.12 BENIGN PAROXYSMAL POSITIONAL VERTIGO, LEFT: Primary | ICD-10-CM

## 2020-06-22 PROCEDURE — 99213 OFFICE O/P EST LOW 20 MIN: CPT | Mod: TEL | Performed by: FAMILY MEDICINE

## 2020-06-22 RX ORDER — MECLIZINE HYDROCHLORIDE 25 MG/1
12.5-25 TABLET ORAL 3 TIMES DAILY PRN
Qty: 30 TABLET | Refills: 3 | Status: SHIPPED | OUTPATIENT
Start: 2020-06-22 | End: 2021-02-16
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
## 2020-06-24 ENCOUNTER — HOSPITAL ENCOUNTER (OUTPATIENT)
Dept: PHYSICAL THERAPY | Facility: CLINIC | Age: 65
Setting detail: THERAPIES SERIES
End: 2020-06-24
Attending: NURSE PRACTITIONER
Payer: COMMERCIAL

## 2020-06-24 PROCEDURE — 97110 THERAPEUTIC EXERCISES: CPT | Mod: GP | Performed by: PHYSICAL THERAPIST

## 2020-07-16 ENCOUNTER — OFFICE VISIT (OUTPATIENT)
Dept: OTOLARYNGOLOGY | Facility: CLINIC | Age: 65
End: 2020-07-16
Payer: COMMERCIAL

## 2020-07-16 ENCOUNTER — OFFICE VISIT (OUTPATIENT)
Dept: AUDIOLOGY | Facility: CLINIC | Age: 65
End: 2020-07-16
Payer: COMMERCIAL

## 2020-07-16 VITALS
SYSTOLIC BLOOD PRESSURE: 132 MMHG | DIASTOLIC BLOOD PRESSURE: 86 MMHG | HEIGHT: 62 IN | WEIGHT: 172 LBS | BODY MASS INDEX: 31.65 KG/M2

## 2020-07-16 DIAGNOSIS — R42 DIZZINESS AND GIDDINESS: Primary | ICD-10-CM

## 2020-07-16 DIAGNOSIS — R42 VERTIGO: Primary | ICD-10-CM

## 2020-07-16 DIAGNOSIS — H93.13 TINNITUS OF BOTH EARS: ICD-10-CM

## 2020-07-16 PROCEDURE — 92557 COMPREHENSIVE HEARING TEST: CPT | Performed by: AUDIOLOGIST

## 2020-07-16 PROCEDURE — 99244 OFF/OP CNSLTJ NEW/EST MOD 40: CPT | Performed by: OTOLARYNGOLOGY

## 2020-07-16 PROCEDURE — 99207 ZZC NO CHARGE LOS: CPT | Performed by: AUDIOLOGIST

## 2020-07-16 PROCEDURE — 92550 TYMPANOMETRY & REFLEX THRESH: CPT | Performed by: AUDIOLOGIST

## 2020-07-16 ASSESSMENT — MIFFLIN-ST. JEOR: SCORE: 1283.44
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
## 2020-07-16 NOTE — PROGRESS NOTES
AUDIOLOGY REPORT     SUMMARY: Audiology visit completed. See audiogram for results.     RECOMMENDATIONS: Follow-up with ENT    Disha Reaves Licensed Audiologist #[id number]

## 2020-07-17 ENCOUNTER — HOSPITAL ENCOUNTER (OUTPATIENT)
Dept: MRI IMAGING | Facility: CLINIC | Age: 65
Discharge: HOME | End: 2020-07-17
Attending: OTOLARYNGOLOGY | Admitting: OTOLARYNGOLOGY
Payer: COMMERCIAL

## 2020-07-17 DIAGNOSIS — R42 VERTIGO: ICD-10-CM

## 2020-07-17 PROCEDURE — 25500064 ZZH RX 255 OP 636: Performed by: OTOLARYNGOLOGY

## 2020-07-17 PROCEDURE — 70553 MRI BRAIN STEM W/O & W/DYE: CPT

## 2020-07-17 PROCEDURE — A9585 GADOBUTROL INJECTION: HCPCS | Performed by: OTOLARYNGOLOGY

## 2020-07-17 RX ORDER — GADOBUTROL 604.72 MG/ML
7.5 INJECTION INTRAVENOUS ONCE
Status: COMPLETED | OUTPATIENT
Start: 2020-07-17 | End: 2020-07-17

## 2020-07-17 RX ADMIN — GADOBUTROL 7.5 ML: 604.72 INJECTION INTRAVENOUS at 13:50

## 2020-07-27 ENCOUNTER — TRANSFERRED RECORDS (OUTPATIENT)
Dept: HEALTH INFORMATION MANAGEMENT | Facility: CLINIC | Age: 65
End: 2020-07-27

## 2020-07-27 ENCOUNTER — TELEPHONE (OUTPATIENT)
Dept: SLEEP MEDICINE | Facility: CLINIC | Age: 65
End: 2020-07-27

## 2020-07-27 NOTE — TELEPHONE ENCOUNTER
Reason for Call:  Other call back    Detailed comments: Please call, pt states that Dr Mina had referred her to the [facility] in [place] and she was seen today and told to talk to Dr Mina about getting orders sent over to them for physical therapy.  She did not have a fax number to send it to but had a phone number of  [phone]    Phone Number Patient can be reached at: Cell number on file:    Telephone Information:   Mobile [phone]       Best Time: any    Can we leave a detailed message on this number? YES    Call taken on 7/27/2020 at 2:31 PM by Corrine Staples

## 2020-07-28 ENCOUNTER — TRANSFERRED RECORDS (OUTPATIENT)
Dept: HEALTH INFORMATION MANAGEMENT | Facility: CLINIC | Age: 65
End: 2020-07-28

## 2020-07-29 ENCOUNTER — TRANSFERRED RECORDS (OUTPATIENT)
Dept: HEALTH INFORMATION MANAGEMENT | Facility: CLINIC | Age: 65
End: 2020-07-29

## 2020-08-05 NOTE — PROGRESS NOTES
History of Present Illness - Stacie Hair is a 65 year old female presenting in clinic today for a recheck on Patient presents with:  RECHECK: Vertigo     Patient presented with vertigo was a migraine component as well but we want further evaluation.  In the past had a more garden-variety benign positional vertigo that was successfully treated with CRP however though slightly helpful lately.  She had gone to the [facility] for full evaluation.  And what it revealed was based on all the testing there was a slight central component present but mostly was peripheral.  She was found to have positional symptoms involving horizontal canals rather than garden-variety posterior canal involvement.  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  Some manipulations were attempted which helped somewhat.  Also calorics showed 66% weakness on the left side again suggesting inner  ear peripheral abnormality.  MRI BRAIN WITHOUT AND WITH CONTRAST  7/17/2020 2:26 PM      HISTORY:  Epidemic vertigo.      TECHNIQUE:  Multiplanar, multisequence MRI of the brain without and  with 7.5 mL Gadavist.      COMPARISON: CT head 8/27/2018.     FINDINGS: There is no evidence of acute infarct, hemorrhage, mass, or  herniation. Brain parenchymal morphology and volume is normal for age.  Mild scattered punctate and patchy foci of T2 and T2 FLAIR  hyperintense signal within the periventricular and deep more than  subcortical cerebral white matter, presumably representing chronic  small vessel ischemic disease. No abnormal intracranial postcontrast  enhancement.     Special attention to the bilateral cranial nerve VII-VIII complexes  demonstrates no mass lesion. There is minimal enhancement in the  fundus of internal auditory canals bilaterally which may be vascular  in nature, as no corresponding soft tissue lesion/nodule is seen on  the thin section highly T2 weighted images (series 8). The  bilateral  cerebellopontine angle cisterns and internal auditory canals appear  within normal limits.     There is a small right mastoid tip effusion. Mild to moderate mucosal  thickening in the bilateral ethmoid air cells. Orbits appear normal.  The calvarium, skull base and mid face otherwise appear unremarkable.  The major vascular flow voids at the skull base appear to be  maintained.                                                                      IMPRESSION:  1. No mass lesion identified in the internal auditory canals or  cerebellopontine angle cisterns. The bilateral cranial nerve VII-VIII  complexes appear within normal limits.  2. Mild presumed chronic small vessel ischemic disease.  3. Small volume right mastoid tip fluid.         BP Readings from Last 1 Encounters:   07/16/20 132/86       BP noted to be well controlled today in office.     Stacie IS NOT a smoker/uses chewing tobacco.  She quit long time ago.  Past Medical History -   Past Medical History:   Diagnosis Date     Herniated discs      Myalgia and myositis, unspecified     Fibromyalgia       Current Medications -   Current Outpatient Medications:      Calcium Carb-Cholecalciferol (CALCIUM + D3 PO), , Disp: , Rfl:      Cyanocobalamin (VITAMIN B-12 PO), , Disp: , Rfl:      hydrochlorothiazide (HYDRODIURIL) 25 MG tablet, TAKE ONE TABLET BY MOUTH ONCE DAILY, Disp: 30 tablet, Rfl: 3     Magnesium 80 MG TABS, , Disp: , Rfl:      meclizine (ANTIVERT) 25 MG tablet, Take 0.5-1 tablets (12.5-25 mg) by mouth 3 times daily as needed for dizziness (Patient not taking: Reported on 7/16/2020), Disp: 30 tablet, Rfl: 3     NEW MED, VitaPulse, Disp: , Rfl:      Omega-3 Fatty Acids (OMEGA 3 PO), Take by mouth daily, Disp: , Rfl:     Allergies -   Allergies   Allergen Reactions     Codeine      Eggs [Eggs]      Milk Products      Nsaids      Intolerance       Social History -   Social History     Socioeconomic History     Marital status:      Spouse  name: Oscar     Number of children: 3     Years of education: 16     Highest education level: Not on file   Occupational History     Occupation: artist   Social Needs     Financial resource strain: Not on file     Food insecurity     Worry: Not on file     Inability: Not on file     Transportation needs     Medical: Not on file     Non-medical: Not on file   Tobacco Use     Smoking status: Former Smoker     Smokeless tobacco: Never Used     Tobacco comment: 1980-quit   Substance and Sexual Activity     Alcohol use: Yes     Alcohol/week: 0.0 standard drinks     Comment: occasional     Drug use: No     Sexual activity: Yes     Partners: Male   Lifestyle     Physical activity     Days per week: Not on file     Minutes per session: Not on file     Stress: Not on file   Relationships     Social connections     Talks on phone: Not on file     Gets together: Not on file     Attends Druze service: Not on file     Active member of club or organization: Not on file     Attends meetings of clubs or organizations: Not on file     Relationship status: Not on file     Intimate partner violence     Fear of current or ex partner: Not on file     Emotionally abused: Not on file     Physically abused: Not on file     Forced sexual activity: Not on file   Other Topics Concern     Parent/sibling w/ CABG, MI or angioplasty before 65F 55M? Not Asked   Social History Narrative     Not on file       Family History -   Family History   Problem Relation Age of Onset     Hypertension Mother      Heart Disease Father         MI       Review of Systems - As per HPI and PMHx, otherwise review of system review of the head and neck negative. Otherwise 10+ review of system is negative    Physical Exam  LMP 05/13/2004   BMI: There is no height or weight on file to calculate BMI.    General - The patient is well nourished and well developed, and appears to have good nutritional status.  Alert and oriented to person and place, answers questions and  cooperates with examination appropriately.    SKIN - No suspicious lesions or rashes.  Respiration - No respiratory distress.  Head and Face - Normocephalic and atraumatic, with no gross asymmetry noted of the contour of the facial features.  The facial nerve is intact, with strong symmetric movements.    Voice and Breathing - The patient was breathing comfortably without the use of accessory muscles. The patients voice was clear and strong, and had appropriate pitch and quality.    Ears - Bilateral pinna and EACs with normal appearing overlying skin. Tympanic membrane intact with good mobility on pneumatic otoscopy bilaterally. Bony landmarks of the ossicular chain are normal. The tympanic membranes are normal in appearance. No retraction, perforation, or masses.  No fluid or purulence was seen in the external canal or the middle ear.     Eyes - Extraocular movements intact.  Sclera were not icteric or injected, conjunctiva were pink and moist.    Mouth - Examination of the oral cavity showed pink, healthy oral mucosa. No lesions or ulcerations noted.  The tongue was mobile and midline, and the dentition were in good condition.      Throat - The walls of the oropharynx were smooth, pink, moist, symmetric, and had no lesions or ulcerations.  The tonsillar pillars and soft palate were symmetric. Tonsils are symmetric. The uvula was midline on elevation.    Neck - Normal midline excursion of the laryngotracheal complex during swallowing.  Full range of motion on passive movement.  Palpation of the occipital, submental, submandibular, internal jugular chain, and supraclavicular nodes did not demonstrate any abnormal lymph nodes or masses.  The carotid pulse was palpable bilaterally.  Palpation of the thyroid was soft and smooth, with no nodules or goiter appreciated.  The trachea was mobile and midline.    Nose - External contour is symmetric, no gross deflection or scars.  Nasal mucosa is pink and moist with no  abnormal mucus.  The septum was midline and non-obstructive, turbinates of normal size and position.  No polyps, masses, or purulence noted on examination.    Neuro - Nonfocal neuro exam is normal, CN 2 through 12 intact, normal gait and muscle tone.      Performed in clinic today:  No procedures preformed in clinic today      A/P - Stacie Hair is a 65 year old female Patient presents with:  RECHECK: Vertigo     Patient with elements of mild central contribution to her vertigo but mostly peripheral.  Left vestibular weakness suggestive of possible migrainous contribution but also could be element of hydrops.  She has bilateral horizontal canal deficiency causing some of the positional issues.  [facility] address horizontal canals further.  Considering that patient did not respond to meclizine will trial oxazepam 10 mg 3 times a day as needed to control acute episodes of vertigo.  We discussed the risks and benefits of medication small Leslie potential.  Also counseled patient again dietary restrictions in regard to salt caffeine chocolate and alcohol.  She is already doing a lot of those precautions.  We spent a 25 of 40 minutes today in counseling the patient regarding those above issues.  She will see me back in 4 months.    At Stacie next appointment they will not need a hearing test.      Fuentes Mina MD

## 2020-08-06 ENCOUNTER — OFFICE VISIT (OUTPATIENT)
Dept: OTOLARYNGOLOGY | Facility: CLINIC | Age: 65
End: 2020-08-06
Payer: COMMERCIAL

## 2020-08-06 VITALS
DIASTOLIC BLOOD PRESSURE: 80 MMHG | BODY MASS INDEX: 31.28 KG/M2 | TEMPERATURE: 97.8 F | SYSTOLIC BLOOD PRESSURE: 130 MMHG | HEIGHT: 62 IN | WEIGHT: 170 LBS

## 2020-08-06 DIAGNOSIS — R42 VERTIGO: Primary | ICD-10-CM

## 2020-08-06 PROCEDURE — 99214 OFFICE O/P EST MOD 30 MIN: CPT | Performed by: OTOLARYNGOLOGY

## 2020-08-06 RX ORDER — OXAZEPAM 10 MG
10 CAPSULE ORAL 3 TIMES DAILY PRN
Qty: 30 CAPSULE | Refills: 1 | Status: SHIPPED | OUTPATIENT
Start: 2020-08-06 | End: 2020-10-05

## 2020-08-06 ASSESSMENT — MIFFLIN-ST. JEOR: SCORE: 1274.36

## 2020-08-06 NOTE — LETTER
8/6/2020         RE: Stacie Hair  [address]  [facility] [address]        Dear Colleague,    Thank you for referring your patient, Stacie Hair, to the [facility]. Please see a copy of my visit note below.    History of Present Illness - Stacie Hair is a 65 year old female presenting in clinic today for a recheck on Patient presents with:  RECHECK: Vertigo     Patient presented with vertigo was a migraine component as well but we want further evaluation.  In the past had a more garden-variety benign positional vertigo that was successfully treated with CRP however though slightly helpful lately.  She had gone to [facility].  And what it revealed was based on all the testing there was a slight central component present but mostly was peripheral.  She was found to have positional symptoms involving horizontal canals rather than garden-variety posterior canal involvement.  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  Some manipulations were attempted which helped somewhat.  Also calorics showed 66% weakness on the left side again suggesting inner  ear peripheral abnormality.  MRI BRAIN WITHOUT AND WITH CONTRAST  7/17/2020 2:26 PM      HISTORY:  Epidemic vertigo.      TECHNIQUE:  Multiplanar, multisequence MRI of the brain without and  with 7.5 mL Gadavist.      COMPARISON: CT head 8/27/2018.     FINDINGS: There is no evidence of acute infarct, hemorrhage, mass, or  herniation. Brain parenchymal morphology and volume is normal for age.  Mild scattered punctate and patchy foci of T2 and T2 FLAIR  hyperintense signal within the periventricular and deep more than  subcortical cerebral white matter, presumably representing chronic  small vessel ischemic disease. No abnormal intracranial postcontrast  enhancement.     Special attention to the bilateral cranial nerve VII-VIII complexes  demonstrates no mass lesion. There  is minimal enhancement in the  fundus of internal auditory canals bilaterally which may be vascular  in nature, as no corresponding soft tissue lesion/nodule is seen on  the thin section highly T2 weighted images (series 8). The bilateral  cerebellopontine angle cisterns and internal auditory canals appear  within normal limits.     There is a small right mastoid tip effusion. Mild to moderate mucosal  thickening in the bilateral ethmoid air cells. Orbits appear normal.  The calvarium, skull base and mid face otherwise appear unremarkable.  The major vascular flow voids at the skull base appear to be  maintained.                                                                      IMPRESSION:  1. No mass lesion identified in the internal auditory canals or  cerebellopontine angle cisterns. The bilateral cranial nerve VII-VIII  complexes appear within normal limits.  2. Mild presumed chronic small vessel ischemic disease.  3. Small volume right mastoid tip fluid.         BP Readings from Last 1 Encounters:   07/16/20 132/86       BP noted to be well controlled today in office.     Stacie IS NOT a smoker/uses chewing tobacco.  She quit long time ago.  Past Medical History -   Past Medical History:   Diagnosis Date     Herniated discs      Myalgia and myositis, unspecified     Fibromyalgia       Current Medications -   Current Outpatient Medications:      Calcium Carb-Cholecalciferol (CALCIUM + D3 PO), , Disp: , Rfl:      Cyanocobalamin (VITAMIN B-12 PO), , Disp: , Rfl:      hydrochlorothiazide (HYDRODIURIL) 25 MG tablet, TAKE ONE TABLET BY MOUTH ONCE DAILY, Disp: 30 tablet, Rfl: 3     Magnesium 80 MG TABS, , Disp: , Rfl:      meclizine (ANTIVERT) 25 MG tablet, Take 0.5-1 tablets (12.5-25 mg) by mouth 3 times daily as needed for dizziness (Patient not taking: Reported on 7/16/2020), Disp: 30 tablet, Rfl: 3     NEW MED, VitaPulse, Disp: , Rfl:      Omega-3 Fatty Acids (OMEGA 3 PO), Take by mouth daily, Disp: , Rfl:      Allergies -   Allergies   Allergen Reactions     Codeine      Eggs [Eggs]      Milk Products      Nsaids      Intolerance       Social History -   Social History     Socioeconomic History     Marital status:      Spouse name: Oscar     Number of children: 3     Years of education: 16     Highest education level: Not on file   Occupational History     Occupation: artist   Social Needs     Financial resource strain: Not on file     Food insecurity     Worry: Not on file     Inability: Not on file     Transportation needs     Medical: Not on file     Non-medical: Not on file   Tobacco Use     Smoking status: Former Smoker     Smokeless tobacco: Never Used     Tobacco comment: 1980-quit   Substance and Sexual Activity     Alcohol use: Yes     Alcohol/week: 0.0 standard drinks     Comment: occasional     Drug use: No     Sexual activity: Yes     Partners: Male   Lifestyle     Physical activity     Days per week: Not on file     Minutes per session: Not on file     Stress: Not on file   Relationships     Social connections     Talks on phone: Not on file     Gets together: Not on file     Attends Cheondoism service: Not on file     Active member of club or organization: Not on file     Attends meetings of clubs or organizations: Not on file     Relationship status: Not on file     Intimate partner violence     Fear of current or ex partner: Not on file     Emotionally abused: Not on file     Physically abused: Not on file     Forced sexual activity: Not on file   Other Topics Concern     Parent/sibling w/ CABG, MI or angioplasty before 65F 55M? Not Asked   Social History Narrative     Not on file       Family History -   Family History   Problem Relation Age of Onset     Hypertension Mother      Heart Disease Father         MI       Review of Systems - As per HPI and PMHx, otherwise review of system review of the head and neck negative. Otherwise 10+ review of system is negative    Physical Exam  LMP 05/13/2004    BMI: There is no height or weight on file to calculate BMI.    General - The patient is well nourished and well developed, and appears to have good nutritional status.  Alert and oriented to person and place, answers questions and cooperates with examination appropriately.    SKIN - No suspicious lesions or rashes.  Respiration - No respiratory distress.  Head and Face - Normocephalic and atraumatic, with no gross asymmetry noted of the contour of the facial features.  The facial nerve is intact, with strong symmetric movements.    Voice and Breathing - The patient was breathing comfortably without the use of accessory muscles. The patients voice was clear and strong, and had appropriate pitch and quality.    Ears - Bilateral pinna and EACs with normal appearing overlying skin. Tympanic membrane intact with good mobility on pneumatic otoscopy bilaterally. Bony landmarks of the ossicular chain are normal. The tympanic membranes are normal in appearance. No retraction, perforation, or masses.  No fluid or purulence was seen in the external canal or the middle ear.     Eyes - Extraocular movements intact.  Sclera were not icteric or injected, conjunctiva were pink and moist.    Mouth - Examination of the oral cavity showed pink, healthy oral mucosa. No lesions or ulcerations noted.  The tongue was mobile and midline, and the dentition were in good condition.      Throat - The walls of the oropharynx were smooth, pink, moist, symmetric, and had no lesions or ulcerations.  The tonsillar pillars and soft palate were symmetric. Tonsils are symmetric. The uvula was midline on elevation.    Neck - Normal midline excursion of the laryngotracheal complex during swallowing.  Full range of motion on passive movement.  Palpation of the occipital, submental, submandibular, internal jugular chain, and supraclavicular nodes did not demonstrate any abnormal lymph nodes or masses.  The carotid pulse was palpable bilaterally.   Palpation of the thyroid was soft and smooth, with no nodules or goiter appreciated.  The trachea was mobile and midline.    Nose - External contour is symmetric, no gross deflection or scars.  Nasal mucosa is pink and moist with no abnormal mucus.  The septum was midline and non-obstructive, turbinates of normal size and position.  No polyps, masses, or purulence noted on examination.    Neuro - Nonfocal neuro exam is normal, CN 2 through 12 intact, normal gait and muscle tone.      Performed in clinic today:  No procedures preformed in clinic today      A/P - Stacie Hair is a 65 year old female Patient presents with:  RECHECK: Vertigo     Patient with elements of mild central contribution to her vertigo but mostly peripheral.  Left vestibular weakness suggestive of possible migrainous contribution but also could be element of hydrops.  She has bilateral horizontal canal deficiency causing some of the positional issues.  [facility] can address horizontal canals further.  Considering that patient did not respond to meclizine will trial oxazepam 10 mg 3 times a day as needed to control acute episodes of vertigo.  We discussed the risks and benefits of medication small Lyubov potential.  Also counseled patient again dietary restrictions in regard to salt caffeine chocolate and alcohol.  She is already doing a lot of those precautions.  We spent a 25 of 40 minutes today in counseling the patient regarding those above issues.  She will see me back in 4 months.    At Stacie next appointment they will not need a hearing test.      Fuentes Mina MD        Again, thank you for allowing me to participate in the care of your patient.        Sincerely,        Fuentes Mina MD, MD

## 2020-08-28 NOTE — PROGRESS NOTES
Outpatient Physical Therapy Discharge Note     Patient: Stacie Hair  : 1955    Beginning/End Dates of Reporting Period:  20 to 2020    Referring Provider: jaxon Jane Diagnosis: dizzy feeling      Client Self Report: since last Rx overall about the same , spoke with doctor and is on antivert meds and overall feels like it helps but is also overall more tired using it ., when questioned she does report that the left side is getting better     Objective Measurements:     Details: dizzy scale 66/100 , overall generall dizzy feeling is getting better and now is more just certain movement that cause dizzy feeling , HEP is going well , meds seem to be helping , is also going ENT doctor       patient seen for 3 Rx sessions for epley in clinic and instruction in HEP at last Rx on 20 she was completing the following 1 eyes still move head left/right , up/down , 2 head still move eyes left/right , up down . 3 single leg balance 1 minute , 4 walking and turning head side to side     As of 2020 she has made no further contact with PT            Goals:  Goal Identifier 1   Goal Description instruction in home program following epley Rx    Target Date 20   Date Met      Progress:     Goal Identifier 2   Goal Description patient to have improved dizzy feeling currently 64/100 goal is 20/100 on dizzy scale    Target Date 20   Date Met      Progress:     Progress Toward Goals:   Progress this reporting period: patient at last contact with noting improvement but was also going to be seen ENT       Plan:  Discharge from therapy.    Discharge:    Reason for Discharge: No further expectation of progress.  Patient chooses to discontinue therapy.  Patient has failed to schedule further appointments.    Equipment Issued: none    Discharge Plan: Patient to continue home program.

## 2020-09-21 ENCOUNTER — OFFICE VISIT (OUTPATIENT)
Dept: OTOLARYNGOLOGY | Facility: CLINIC | Age: 65
End: 2020-09-21
Payer: COMMERCIAL

## 2020-09-21 VITALS
DIASTOLIC BLOOD PRESSURE: 82 MMHG | BODY MASS INDEX: 31.83 KG/M2 | SYSTOLIC BLOOD PRESSURE: 128 MMHG | WEIGHT: 173 LBS | HEIGHT: 62 IN

## 2020-09-21 DIAGNOSIS — H83.8X1 SUPERIOR SEMICIRCULAR CANAL DEHISCENCE OF RIGHT EAR: Primary | ICD-10-CM

## 2020-09-21 DIAGNOSIS — R42 VERTIGO: ICD-10-CM

## 2020-09-21 PROCEDURE — 99213 OFFICE O/P EST LOW 20 MIN: CPT | Performed by: OTOLARYNGOLOGY

## 2020-09-21 ASSESSMENT — MIFFLIN-ST. JEOR: SCORE: 1287.97

## 2020-09-21 NOTE — PROGRESS NOTES
History of Present Illness - Stacie Hair is a 65 year old female presents for evaluation of her issues with balance and dizziness.  [facility]  That time showed primarily peripheral involvement of her vestibular system with slight central component.  Potential positional vertigo involving horizontal canals was also appreciated.  However most important finding was 66% weakness on calorics on the left side but also significant right directional preponderance.  At that point we talked about Ménière's type of lifestyle and diet as well as start patient on C-spine since meclizine was not very helpful.  Excess amount helps some with anxiety associated with the process but not the dizziness.  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  This has been ongoing for a while.  She also notes that  any changes and pressure atmospheric pressure she gets more dizzy.  She denies any trauma or any potential causes for fistula.      Past Medical History -   Past Medical History:   Diagnosis Date     Herniated discs      Myalgia and myositis, unspecified     Fibromyalgia       Current Medications -   Current Outpatient Medications:      Calcium Carb-Cholecalciferol (CALCIUM + D3 PO), , Disp: , Rfl:      Cyanocobalamin (VITAMIN B-12 PO), , Disp: , Rfl:      hydrochlorothiazide (HYDRODIURIL) 25 MG tablet, TAKE ONE TABLET BY MOUTH ONCE DAILY, Disp: 30 tablet, Rfl: 3     Magnesium 80 MG TABS, , Disp: , Rfl:      meclizine (ANTIVERT) 25 MG tablet, Take 0.5-1 tablets (12.5-25 mg) by mouth 3 times daily as needed for dizziness (Patient not taking: Reported on 7/16/2020), Disp: 30 tablet, Rfl: 3     NEW MED, VitaPulse, Disp: , Rfl:      Omega-3 Fatty Acids (OMEGA 3 PO), Take by mouth daily, Disp: , Rfl:      oxazepam (SERAX) 10 MG capsule, Take 1 capsule (10 mg)  by mouth 3 times daily as needed for anxiety (vertigo), Disp: 30 capsule, Rfl: 1    Allergies -   Allergies   Allergen Reactions     Codeine      Eggs [Eggs]      Milk Products      Nsaids      Intolerance       Social History -   Social History     Socioeconomic History     Marital status:      Spouse name: Oscar     Number of children: 3     Years of education: 16     Highest education level: Not on file   Occupational History     Occupation: artist   Social Needs     Financial resource strain: Not on file     Food insecurity     Worry: Not on file     Inability: Not on file     Transportation needs     Medical: Not on file     Non-medical: Not on file   Tobacco Use     Smoking status: Former Smoker     Smokeless tobacco: Never Used     Tobacco comment: 1980-quit   Substance and Sexual Activity     Alcohol use: Yes     Alcohol/week: 0.0 standard drinks     Comment: occasional     Drug use: No     Sexual activity: Yes     Partners: Male   Lifestyle     Physical activity     Days per week: Not on file     Minutes per session: Not on file     Stress: Not on file   Relationships     Social connections     Talks on phone: Not on file     Gets together: Not on file     Attends Protestant service: Not on file     Active member of club or organization: Not on file     Attends meetings of clubs or organizations: Not on file     Relationship status: Not on file     Intimate partner violence     Fear of current or ex partner: Not on file     Emotionally abused: Not on file     Physically abused: Not on file     Forced sexual activity: Not on file   Other Topics Concern     Parent/sibling w/ CABG, MI or angioplasty before 65F 55M? Not Asked   Social History Narrative     Not on file       Family History -   Family History   Problem Relation Age of Onset     Hypertension Mother      Heart Disease Father         MI       Review of Systems - As per HPI and PMHx, otherwise review of system review of the head and neck  negative. Otherwise 10+ review systems negative.    Physical Exam  LMP 05/13/2004   BMI: There is no height or weight on file to calculate BMI.    General - The patient is well nourished and well developed, and appears to have good nutritional status.  Alert and oriented to person and place, answers questions and cooperates with examination appropriately.    SKIN - No suspicious lesions or rashes.  Respiration - No respiratory distress.  Head and Face - Normocephalic and atraumatic, with no gross asymmetry noted of the contour of the facial features.  The facial nerve is intact, with strong symmetric movements.    Voice and Breathing - The patient was breathing comfortably without the use of accessory muscles. The patients voice was clear and strong, and had appropriate pitch and quality.    Ears - Bilateral pinna and EACs with normal appearing overlying skin. Tympanic membrane intact with good mobility on pneumatic otoscopy bilaterally. Bony landmarks of the ossicular chain are normal. The tympanic membranes are normal in appearance. No retraction, perforation, or masses.  No fluid or purulence was seen in the external canal or the middle ear.     Eyes - Extraocular movements intact.  Sclera were not icteric or injected, conjunctiva were pink and moist.    Mouth - Examination of the oral cavity showed pink, healthy oral mucosa. No lesions or ulcerations noted.  The tongue was mobile and midline, and the dentition were in good condition.      Throat - The walls of the oropharynx were smooth, pink, moist, symmetric, and had no lesions or ulcerations.  The tonsillar pillars and soft palate were symmetric.   Neck - Normal midline excursion of the laryngotracheal complex during swallowing.  Full range of motion on passive movement.  Palpation of the occipital, submental, submandibular, internal jugular chain, and supraclavicular nodes did not demonstrate any abnormal lymph nodes or masses.  The carotid pulse was  palpable bilaterally.  Palpation of the thyroid was soft and smooth, with no nodules or goiter appreciated.  The trachea was mobile and midline.    Nose - External contour is symmetric, no gross deflection or scars.  Nasal mucosa is pink and moist with no abnormal mucus.  The septum was midline and non-obstructive, turbinates of normal size and position.  No polyps, masses, or purulence noted on examination.    Neuro - Nonfocal neuro exam is normal, CN 2 through 12 intact, normal gait and muscle tone.      Performed in clinic today:  Insufflation of the eardrums bilaterally produces some feeling of dizziness vertigo on patient's right ear.  Some slight nystagmus is appreciated by  looking at patient eyes .          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  Therefore appropriate CT scan with appropriate views will be ordered.  We shall discuss results with the patient afterwards.  Based on those results we will decide whether to further treat patient in the direction of Ménière's disease or address superior canal dehiscence more thoroughly.  Fuentes Mina MD

## 2020-09-21 NOTE — LETTER
9/21/2020         RE: Stacie Hair  [id number] [facility] [id number]        Dear Colleague,    Thank you for referring your patient, Stacie Hair, to the [facility]. Please see a copy of my visit note below.    History of Present Illness - Stacie Hair is a 65 year old female presents for evaluation of her issues with balance and dizziness.  She has had full work-up at the [place] [facility].  That time showed primarily peripheral involvement of her vestibular system with slight central component.  Potential positional vertigo involving horizontal canals was also appreciated.  However most important finding was 66% weakness on calorics on the left side but also significant right directional preponderance.  At that point we talked about Ménière's type of lifestyle and diet as well as start patient on C-spine since meclizine was not very helpful.  Excess amount helps some with anxiety associated with the process but not the dizziness.  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  This has been ongoing for a while.  She also notes that  any changes and pressure atmospheric pressure she gets more dizzy.  She denies any trauma or any potential causes for fistula.      Past Medical History -   Past Medical History:   Diagnosis Date     Herniated discs      Myalgia and myositis, unspecified     Fibromyalgia       Current Medications -   Current Outpatient Medications:      Calcium Carb-Cholecalciferol (CALCIUM + D3 PO), , Disp: , Rfl:      Cyanocobalamin (VITAMIN B-12 PO), , Disp: , Rfl:      hydrochlorothiazide (HYDRODIURIL) 25 MG tablet, TAKE ONE TABLET BY MOUTH ONCE DAILY, Disp: 30 tablet, Rfl: 3     Magnesium 80 MG TABS, , Disp: , Rfl:      meclizine (ANTIVERT) 25 MG tablet, Take 0.5-1 tablets (12.5-25 mg) by mouth 3 times daily as needed for  dizziness (Patient not taking: Reported on 7/16/2020), Disp: 30 tablet, Rfl: 3     NEW MED, VitaPulse, Disp: , Rfl:      Omega-3 Fatty Acids (OMEGA 3 PO), Take by mouth daily, Disp: , Rfl:      oxazepam (SERAX) 10 MG capsule, Take 1 capsule (10 mg) by mouth 3 times daily as needed for anxiety (vertigo), Disp: 30 capsule, Rfl: 1    Allergies -   Allergies   Allergen Reactions     Codeine      Eggs [Eggs]      Milk Products      Nsaids      Intolerance       Social History -   Social History     Socioeconomic History     Marital status:      Spouse name: Oscar     Number of children: 3     Years of education: 16     Highest education level: Not on file   Occupational History     Occupation: artist   Social Needs     Financial resource strain: Not on file     Food insecurity     Worry: Not on file     Inability: Not on file     Transportation needs     Medical: Not on file     Non-medical: Not on file   Tobacco Use     Smoking status: Former Smoker     Smokeless tobacco: Never Used     Tobacco comment: 1980-quit   Substance and Sexual Activity     Alcohol use: Yes     Alcohol/week: 0.0 standard drinks     Comment: occasional     Drug use: No     Sexual activity: Yes     Partners: Male   Lifestyle     Physical activity     Days per week: Not on file     Minutes per session: Not on file     Stress: Not on file   Relationships     Social connections     Talks on phone: Not on file     Gets together: Not on file     Attends Christian service: Not on file     Active member of club or organization: Not on file     Attends meetings of clubs or organizations: Not on file     Relationship status: Not on file     Intimate partner violence     Fear of current or ex partner: Not on file     Emotionally abused: Not on file     Physically abused: Not on file     Forced sexual activity: Not on file   Other Topics Concern     Parent/sibling w/ CABG, MI or angioplasty before 65F 55M? Not Asked   Social History Narrative     Not  on file       Family History -   Family History   Problem Relation Age of Onset     Hypertension Mother      Heart Disease Father         MI       Review of Systems - As per HPI and PMHx, otherwise review of system review of the head and neck negative. Otherwise 10+ review systems negative.    Physical Exam  LMP 05/13/2004   BMI: There is no height or weight on file to calculate BMI.    General - The patient is well nourished and well developed, and appears to have good nutritional status.  Alert and oriented to person and place, answers questions and cooperates with examination appropriately.    SKIN - No suspicious lesions or rashes.  Respiration - No respiratory distress.  Head and Face - Normocephalic and atraumatic, with no gross asymmetry noted of the contour of the facial features.  The facial nerve is intact, with strong symmetric movements.    Voice and Breathing - The patient was breathing comfortably without the use of accessory muscles. The patients voice was clear and strong, and had appropriate pitch and quality.    Ears - Bilateral pinna and EACs with normal appearing overlying skin. Tympanic membrane intact with good mobility on pneumatic otoscopy bilaterally. Bony landmarks of the ossicular chain are normal. The tympanic membranes are normal in appearance. No retraction, perforation, or masses.  No fluid or purulence was seen in the external canal or the middle ear.     Eyes - Extraocular movements intact.  Sclera were not icteric or injected, conjunctiva were pink and moist.    Mouth - Examination of the oral cavity showed pink, healthy oral mucosa. No lesions or ulcerations noted.  The tongue was mobile and midline, and the dentition were in good condition.      Throat - The walls of the oropharynx were smooth, pink, moist, symmetric, and had no lesions or ulcerations.  The tonsillar pillars and soft palate were symmetric.   Neck - Normal midline excursion of the laryngotracheal complex during  swallowing.  Full range of motion on passive movement.  Palpation of the occipital, submental, submandibular, internal jugular chain, and supraclavicular nodes did not demonstrate any abnormal lymph nodes or masses.  The carotid pulse was palpable bilaterally.  Palpation of the thyroid was soft and smooth, with no nodules or goiter appreciated.  The trachea was mobile and midline.    Nose - External contour is symmetric, no gross deflection or scars.  Nasal mucosa is pink and moist with no abnormal mucus.  The septum was midline and non-obstructive, turbinates of normal size and position.  No polyps, masses, or purulence noted on examination.    Neuro - Nonfocal neuro exam is normal, CN 2 through 12 intact, normal gait and muscle tone.      Performed in clinic today:  Insufflation of the eardrums bilaterally produces some feeling of dizziness vertigo on patient's right ear.  Some slight nystagmus is appreciated by  looking at patient eyes .          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  Therefore appropriate CT scan with appropriate views will be ordered.  We shall discuss results with the patient afterwards.  Based on those results we will decide whether to further treat patient in the direction of Ménière's disease or address superior canal dehiscence more thoroughly.  Fuentes Mina MD        Again, thank you for allowing me to participate in the care of your patient.        Sincerely,        Fuentes Mina MD, MD

## 2020-09-22 DIAGNOSIS — I10 BENIGN ESSENTIAL HYPERTENSION: ICD-10-CM

## 2020-09-22 RX ORDER — HYDROCHLOROTHIAZIDE 25 MG/1
TABLET ORAL
Qty: 30 TABLET | Refills: 3 | Status: SHIPPED | OUTPATIENT
Start: 2020-09-22 | End: 2021-01-20

## 2020-09-22 NOTE — TELEPHONE ENCOUNTER
Routing refill request to provider for review/approval because:  Labs not current:  Creatinine, Potassium, Sodium.    Alea Allison RN

## 2020-09-23 ENCOUNTER — HOSPITAL ENCOUNTER (OUTPATIENT)
Dept: CT IMAGING | Facility: CLINIC | Age: 65
Discharge: HOME | End: 2020-09-23
Attending: OTOLARYNGOLOGY | Admitting: OTOLARYNGOLOGY
Payer: COMMERCIAL

## 2020-09-23 DIAGNOSIS — H83.8X1 SUPERIOR SEMICIRCULAR CANAL DEHISCENCE OF RIGHT EAR: ICD-10-CM

## 2020-09-23 PROCEDURE — 70480 CT ORBIT/EAR/FOSSA W/O DYE: CPT

## 2020-10-02 NOTE — PROGRESS NOTES
History of Present Illness - Stacie Hair is a 65 year old female presents for evaluation of   Of her dizziness.  She has experienced no vertigo but just disequilibrium and dizziness.  Definitely oxazepam seems to help she takes very occasionally maybe 1 or 2 pills a day at the most 10 mg.  She was diagnosed with unilateral [facility] with 66% weakness on the left.  However he hearing is unchanged.  There was a concern about potential semicircular canal dehiscence on that side.  CT scan with appropriate projections was performed and it was negative.    Past Medical History -   Past Medical History:   Diagnosis Date     Herniated discs      Myalgia and myositis, unspecified     Fibromyalgia       Current Medications -   Current Outpatient Medications:      Calcium Carb-Cholecalciferol (CALCIUM + D3 PO), , Disp: , Rfl:      Cyanocobalamin (VITAMIN B-12 PO), , Disp: , Rfl:      hydrochlorothiazide (HYDRODIURIL) 25 MG tablet, TAKE ONE TABLET BY MOUTH ONCE DAILY, Disp: 30 tablet, Rfl: 3     Magnesium 80 MG TABS, , Disp: , Rfl:      meclizine (ANTIVERT) 25 MG tablet, Take 0.5-1 tablets (12.5-25 mg) by mouth 3 times daily as needed for dizziness, Disp: 30 tablet, Rfl: 3     NEW MED, VitaPulse, Disp: , Rfl:      Omega-3 Fatty Acids (OMEGA 3 PO), Take by mouth daily, Disp: , Rfl:      oxazepam (SERAX) 10 MG capsule, Take 1 capsule (10 mg) by mouth 3 times daily as needed for anxiety (vertigo), Disp: 30 capsule, Rfl: 1    Allergies -   Allergies   Allergen Reactions     Codeine      Eggs [Eggs]      Milk Products      Nsaids      Intolerance       Social History -   Social History     Socioeconomic History     Marital status:      Spouse name: Oscar     Number of children: 3     Years of education: 16     Highest education level: Not on file   Occupational History     Occupation: artist   Social Needs     Financial resource strain: Not on file     Food  insecurity     Worry: Not on file     Inability: Not on file     Transportation needs     Medical: Not on file     Non-medical: Not on file   Tobacco Use     Smoking status: Former Smoker     Smokeless tobacco: Never Used     Tobacco comment: 1980-quit   Substance and Sexual Activity     Alcohol use: Yes     Alcohol/week: 0.0 standard drinks     Comment: occasional     Drug use: No     Sexual activity: Yes     Partners: Male   Lifestyle     Physical activity     Days per week: Not on file     Minutes per session: Not on file     Stress: Not on file   Relationships     Social connections     Talks on phone: Not on file     Gets together: Not on file     Attends Alevism service: Not on file     Active member of club or organization: Not on file     Attends meetings of clubs or organizations: Not on file     Relationship status: Not on file     Intimate partner violence     Fear of current or ex partner: Not on file     Emotionally abused: Not on file     Physically abused: Not on file     Forced sexual activity: Not on file   Other Topics Concern     Parent/sibling w/ CABG, MI or angioplasty before 65F 55M? Not Asked   Social History Narrative     Not on file       Family History -   Family History   Problem Relation Age of Onset     Hypertension Mother      Heart Disease Father         MI       Review of Systems - As per HPI and PMHx, otherwise review of system review of the head and neck negative. Otherwise 10+ review systems negative.    Physical Exam  LMP 05/13/2004   BMI: There is no height or weight on file to calculate BMI.    General - The patient is well nourished and well developed, and appears to have good nutritional status.  Alert and oriented to person and place, answers questions and cooperates with examination appropriately.    SKIN - No suspicious lesions or rashes.  Respiration - No respiratory distress.  Head and Face - Normocephalic and atraumatic, with no gross asymmetry noted of the contour of  the facial features.  The facial nerve is intact, with strong symmetric movements.    Voice and Breathing - The patient was breathing comfortably without the use of accessory muscles. The patients voice was clear and strong, and had appropriate pitch and quality.    Ears - Bilateral pinna and EACs with normal appearing overlying skin. Tympanic membrane intact with good mobility on pneumatic otoscopy bilaterally. Bony landmarks of the ossicular chain are normal. The tympanic membranes are normal in appearance. No retraction, perforation, or masses.  No fluid or purulence was seen in the external canal or the middle ear.     Eyes - Extraocular movements intact.  Sclera were not icteric or injected, conjunctiva were pink and moist.        Neuro - Nonfocal neuro exam is normal, CN 2 through 12 intact, normal gait and muscle tone.      Performed in clinic today:  No procedures preformed in clinic today          A/P - Stacie Hair is a 65 year old female Patient presents with:  Follow Up: dizziness        Patient with otologic disorder that fits more of a hydrops picture.  We again reinforced the idea of dietary restriction especially salt and caffeine.  Oxazepam will be prescribed to take very sparingly as needed.  She also will benefit from a vestibular rehabilitation therapy which I will order.  I would like to see the patient back in 4 months for repeat hearing test and reevaluation.        Fuentes Mina MD

## 2020-10-05 ENCOUNTER — OFFICE VISIT (OUTPATIENT)
Dept: OTOLARYNGOLOGY | Facility: CLINIC | Age: 65
End: 2020-10-05
Payer: COMMERCIAL

## 2020-10-05 VITALS
WEIGHT: 173 LBS | DIASTOLIC BLOOD PRESSURE: 86 MMHG | BODY MASS INDEX: 31.83 KG/M2 | SYSTOLIC BLOOD PRESSURE: 128 MMHG | HEIGHT: 62 IN

## 2020-10-05 DIAGNOSIS — R42 DIZZINESS: Primary | ICD-10-CM

## 2020-10-05 DIAGNOSIS — R42 VERTIGO: ICD-10-CM

## 2020-10-05 PROCEDURE — 99213 OFFICE O/P EST LOW 20 MIN: CPT | Performed by: OTOLARYNGOLOGY

## 2020-10-05 RX ORDER — OXAZEPAM 10 MG
10 CAPSULE ORAL 2 TIMES DAILY PRN
Qty: 30 CAPSULE | Refills: 3 | Status: SHIPPED | OUTPATIENT
Start: 2020-10-05 | End: 2020-11-04

## 2020-10-05 ASSESSMENT — MIFFLIN-ST. JEOR: SCORE: 1287.97

## 2020-10-05 NOTE — LETTER
10/5/2020         RE: Stacie Hair  [id number] [facility] [id number]        Dear Colleague,    Thank you for referring your patient, Stacie Hair, to the [facility]. Please see a copy of my visit note below.    History of Present Illness - Stacie Hair is a 65 year old female presents for evaluation of   Of her dizziness.  She has experienced no vertigo but just disequilibrium and dizziness.  Definitely oxazepam seems to help she takes very occasionally maybe 1 or 2 pills a day at the most 10 mg.  She was diagnosed with unilateral vest[facility] with 66% weakness on the left.  However he hearing is unchanged.  There was a concern about potential semicircular canal dehiscence on that side.  CT scan with appropriate projections was performed and it was negative.    Past Medical History -   Past Medical History:   Diagnosis Date     Herniated discs      Myalgia and myositis, unspecified     Fibromyalgia       Current Medications -   Current Outpatient Medications:      Calcium Carb-Cholecalciferol (CALCIUM + D3 PO), , Disp: , Rfl:      Cyanocobalamin (VITAMIN B-12 PO), , Disp: , Rfl:      hydrochlorothiazide (HYDRODIURIL) 25 MG tablet, TAKE ONE TABLET BY MOUTH ONCE DAILY, Disp: 30 tablet, Rfl: 3     Magnesium 80 MG TABS, , Disp: , Rfl:      meclizine (ANTIVERT) 25 MG tablet, Take 0.5-1 tablets (12.5-25 mg) by mouth 3 times daily as needed for dizziness, Disp: 30 tablet, Rfl: 3     NEW MED, VitaPulse, Disp: , Rfl:      Omega-3 Fatty Acids (OMEGA 3 PO), Take by mouth daily, Disp: , Rfl:      oxazepam (SERAX) 10 MG capsule, Take 1 capsule (10 mg) by mouth 3 times daily as needed for anxiety (vertigo), Disp: 30 capsule, Rfl: 1    Allergies -   Allergies   Allergen Reactions     Codeine      Eggs [Eggs]      Milk Products      Nsaids      Intolerance       Social History -   Social History     Socioeconomic History      Marital status:      Spouse name: Oscar     Number of children: 3     Years of education: 16     Highest education level: Not on file   Occupational History     Occupation: artist   Social Needs     Financial resource strain: Not on file     Food insecurity     Worry: Not on file     Inability: Not on file     Transportation needs     Medical: Not on file     Non-medical: Not on file   Tobacco Use     Smoking status: Former Smoker     Smokeless tobacco: Never Used     Tobacco comment: 1980-quit   Substance and Sexual Activity     Alcohol use: Yes     Alcohol/week: 0.0 standard drinks     Comment: occasional     Drug use: No     Sexual activity: Yes     Partners: Male   Lifestyle     Physical activity     Days per week: Not on file     Minutes per session: Not on file     Stress: Not on file   Relationships     Social connections     Talks on phone: Not on file     Gets together: Not on file     Attends [employer]ist service: Not on file     Active member of club or organization: Not on file     Attends meetings of clubs or organizations: Not on file     Relationship status: Not on file     Intimate partner violence     Fear of current or ex partner: Not on file     Emotionally abused: Not on file     Physically abused: Not on file     Forced sexual activity: Not on file   Other Topics Concern     Parent/sibling w/ CABG, MI or angioplasty before 65F 55M? Not Asked   Social History Narrative     Not on file       Family History -   Family History   Problem Relation Age of Onset     Hypertension Mother      Heart Disease Father         MI       Review of Systems - As per HPI and PMHx, otherwise review of system review of the head and neck negative. Otherwise 10+ review systems negative.    Physical Exam  LMP 05/13/2004   BMI: There is no height or weight on file to calculate BMI.    General - The patient is well nourished and well developed, and appears to have good nutritional status.  Alert and oriented to  person and place, answers questions and cooperates with examination appropriately.    SKIN - No suspicious lesions or rashes.  Respiration - No respiratory distress.  Head and Face - Normocephalic and atraumatic, with no gross asymmetry noted of the contour of the facial features.  The facial nerve is intact, with strong symmetric movements.    Voice and Breathing - The patient was breathing comfortably without the use of accessory muscles. The patients voice was clear and strong, and had appropriate pitch and quality.    Ears - Bilateral pinna and EACs with normal appearing overlying skin. Tympanic membrane intact with good mobility on pneumatic otoscopy bilaterally. Bony landmarks of the ossicular chain are normal. The tympanic membranes are normal in appearance. No retraction, perforation, or masses.  No fluid or purulence was seen in the external canal or the middle ear.     Eyes - Extraocular movements intact.  Sclera were not icteric or injected, conjunctiva were pink and moist.        Neuro - Nonfocal neuro exam is normal, CN 2 through 12 intact, normal gait and muscle tone.      Performed in clinic today:  No procedures preformed in clinic today          A/P - Stacie Hair is a 65 year old female Patient presents with:  Follow Up: dizziness        Patient with otologic disorder that fits more of a hydrops picture.  We again reinforced the idea of dietary restriction especially salt and caffeine.  Oxazepam will be prescribed to take very sparingly as needed.  She also will benefit from a vestibular rehabilitation therapy which I will order.  I would like to see the patient back in 4 months for repeat hearing test and reevaluation.        Fuentes Mina MD          Again, thank you for allowing me to participate in the care of your patient.        Sincerely,        Fuentes Mina MD, MD

## 2020-10-06 ENCOUNTER — HOSPITAL ENCOUNTER (OUTPATIENT)
Dept: PHYSICAL THERAPY | Facility: CLINIC | Age: 65
Setting detail: THERAPIES SERIES
End: 2020-10-06
Attending: OTOLARYNGOLOGY
Payer: COMMERCIAL

## 2020-10-06 DIAGNOSIS — R42 DIZZINESS: ICD-10-CM

## 2020-10-06 PROCEDURE — 97161 PT EVAL LOW COMPLEX 20 MIN: CPT | Mod: GP | Performed by: PHYSICAL THERAPIST

## 2020-10-06 PROCEDURE — 97112 NEUROMUSCULAR REEDUCATION: CPT | Mod: GP | Performed by: PHYSICAL THERAPIST

## 2020-10-06 PROCEDURE — 97110 THERAPEUTIC EXERCISES: CPT | Mod: GP | Performed by: PHYSICAL THERAPIST
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
## 2020-10-12 ENCOUNTER — HOSPITAL ENCOUNTER (OUTPATIENT)
Dept: PHYSICAL THERAPY | Facility: CLINIC | Age: 65
Setting detail: THERAPIES SERIES
End: 2020-10-12
Attending: OTOLARYNGOLOGY
Payer: COMMERCIAL

## 2020-10-12 PROCEDURE — 97110 THERAPEUTIC EXERCISES: CPT | Mod: GP | Performed by: PHYSICAL THERAPIST

## 2020-10-12 PROCEDURE — 97530 THERAPEUTIC ACTIVITIES: CPT | Mod: GP | Performed by: PHYSICAL THERAPIST

## 2020-10-12 PROCEDURE — 97112 NEUROMUSCULAR REEDUCATION: CPT | Mod: GP | Performed by: PHYSICAL THERAPIST

## 2020-10-15 ENCOUNTER — HOSPITAL ENCOUNTER (OUTPATIENT)
Dept: PHYSICAL THERAPY | Facility: CLINIC | Age: 65
Setting detail: THERAPIES SERIES
End: 2020-10-15
Attending: OTOLARYNGOLOGY
Payer: COMMERCIAL

## 2020-10-15 PROCEDURE — 97530 THERAPEUTIC ACTIVITIES: CPT | Mod: GP | Performed by: PHYSICAL THERAPIST

## 2020-10-15 PROCEDURE — 97112 NEUROMUSCULAR REEDUCATION: CPT | Mod: GP | Performed by: PHYSICAL THERAPIST

## 2020-10-19 ENCOUNTER — HOSPITAL ENCOUNTER (OUTPATIENT)
Dept: PHYSICAL THERAPY | Facility: CLINIC | Age: 65
Setting detail: THERAPIES SERIES
End: 2020-10-19
Attending: OTOLARYNGOLOGY
Payer: COMMERCIAL

## 2020-10-19 PROCEDURE — 97112 NEUROMUSCULAR REEDUCATION: CPT | Mod: GP | Performed by: PHYSICAL THERAPIST

## 2020-10-19 PROCEDURE — 97530 THERAPEUTIC ACTIVITIES: CPT | Mod: GP | Performed by: PHYSICAL THERAPIST

## 2020-10-19 PROCEDURE — 97110 THERAPEUTIC EXERCISES: CPT | Mod: GP | Performed by: PHYSICAL THERAPIST

## 2020-10-22 ENCOUNTER — HOSPITAL ENCOUNTER (OUTPATIENT)
Dept: PHYSICAL THERAPY | Facility: CLINIC | Age: 65
Setting detail: THERAPIES SERIES
End: 2020-10-22
Attending: OTOLARYNGOLOGY
Payer: COMMERCIAL

## 2020-10-22 PROCEDURE — 97530 THERAPEUTIC ACTIVITIES: CPT | Mod: GP | Performed by: PHYSICAL THERAPIST

## 2020-10-22 PROCEDURE — 97110 THERAPEUTIC EXERCISES: CPT | Mod: GP | Performed by: PHYSICAL THERAPIST

## 2020-10-22 PROCEDURE — 97140 MANUAL THERAPY 1/> REGIONS: CPT | Mod: GP | Performed by: PHYSICAL THERAPIST

## 2020-10-28 ENCOUNTER — HOSPITAL ENCOUNTER (OUTPATIENT)
Dept: PHYSICAL THERAPY | Facility: CLINIC | Age: 65
Setting detail: THERAPIES SERIES
End: 2020-10-28
Attending: OTOLARYNGOLOGY
Payer: COMMERCIAL

## 2020-10-28 PROCEDURE — 97530 THERAPEUTIC ACTIVITIES: CPT | Mod: GP | Performed by: PHYSICAL THERAPIST

## 2020-10-28 PROCEDURE — 97110 THERAPEUTIC EXERCISES: CPT | Mod: GP | Performed by: PHYSICAL THERAPIST

## 2020-11-05 ENCOUNTER — HOSPITAL ENCOUNTER (OUTPATIENT)
Dept: PHYSICAL THERAPY | Facility: CLINIC | Age: 65
Setting detail: THERAPIES SERIES
End: 2020-11-05
Attending: OTOLARYNGOLOGY
Payer: COMMERCIAL

## 2020-11-05 PROCEDURE — 97110 THERAPEUTIC EXERCISES: CPT | Mod: GP | Performed by: PHYSICAL THERAPIST

## 2020-11-05 PROCEDURE — 97530 THERAPEUTIC ACTIVITIES: CPT | Mod: GP | Performed by: PHYSICAL THERAPIST

## 2020-11-18 ENCOUNTER — HOSPITAL ENCOUNTER (OUTPATIENT)
Dept: PHYSICAL THERAPY | Facility: CLINIC | Age: 65
Setting detail: THERAPIES SERIES
End: 2020-11-18
Attending: OTOLARYNGOLOGY
Payer: COMMERCIAL

## 2020-11-18 PROCEDURE — 97110 THERAPEUTIC EXERCISES: CPT | Mod: GP | Performed by: PHYSICAL THERAPIST

## 2020-11-18 PROCEDURE — 97530 THERAPEUTIC ACTIVITIES: CPT | Mod: GP | Performed by: PHYSICAL THERAPIST

## 2020-11-18 PROCEDURE — 97112 NEUROMUSCULAR REEDUCATION: CPT | Mod: GP | Performed by: PHYSICAL THERAPIST

## 2020-12-02 ENCOUNTER — HOSPITAL ENCOUNTER (OUTPATIENT)
Dept: PHYSICAL THERAPY | Facility: CLINIC | Age: 65
Setting detail: THERAPIES SERIES
End: 2020-12-02
Attending: OTOLARYNGOLOGY
Payer: COMMERCIAL

## 2020-12-02 PROCEDURE — 97110 THERAPEUTIC EXERCISES: CPT | Mod: GP | Performed by: PHYSICAL THERAPIST

## 2020-12-02 PROCEDURE — 97530 THERAPEUTIC ACTIVITIES: CPT | Mod: GP | Performed by: PHYSICAL THERAPIST

## 2020-12-02 PROCEDURE — 97112 NEUROMUSCULAR REEDUCATION: CPT | Mod: GP | Performed by: PHYSICAL THERAPIST

## 2020-12-04 NOTE — PROGRESS NOTES
History of Present Illness - Stacie Hair is a 65 year old female presents for evaluation of her dizziness.  She was found to have unilateral weakness over the vestibular system on the left at 66%.  No other significant findings [facility].  There was a initially question of potential semicircular canal dehiscence but the appropriate CT scan did not show any evidence of this potential problem the MRI of the brain of course not show any pathology even though patient now complains that the she still feels some vertigo and disequilibrium only when she moves in spite of the fact that positional testing was normal.  She definitely does better with the function during the day otherwise after physical therapy and with taking oxazepam once daily at the low dose.  She also admits to the fact that when she tries to busy she gets some tremors in her extremities specially her hands and some paresthesias.  She does not admit to significant anxiety.  She has never had full neuro neurologic evaluation.    MRI BRAIN WITHOUT AND WITH CONTRAST  7/17/2020 2:26 PM      HISTORY:  Epidemic vertigo.      TECHNIQUE:  Multiplanar, multisequence MRI of the brain without and  with 7.5 mL Gadavist.      COMPARISON: CT head 8/27/2018.     FINDINGS: There is no evidence of acute infarct, hemorrhage, mass, or  herniation. Brain parenchymal morphology and volume is normal for age.  Mild scattered punctate and patchy foci of T2 and T2 FLAIR  hyperintense signal within the periventricular and deep more than  subcortical cerebral white matter, presumably representing chronic  small vessel ischemic disease. No abnormal intracranial postcontrast  enhancement.     Special attention to the bilateral cranial nerve VII-VIII complexes  demonstrates no mass lesion. There is minimal enhancement in the  fundus of internal auditory canals bilaterally which may be vascular  in nature, as no  corresponding soft tissue lesion/nodule is seen on  the thin section highly T2 weighted images (series 8). The bilateral  cerebellopontine angle cisterns and internal auditory canals appear  within normal limits.     There is a small right mastoid tip effusion. Mild to moderate mucosal  thickening in the bilateral ethmoid air cells. Orbits appear normal.  The calvarium, skull base and mid face otherwise appear unremarkable.  The major vascular flow voids at the skull base appear to be  maintained.                                                                      IMPRESSION:  1. No mass lesion identified in the internal auditory canals or  cerebellopontine angle cisterns. The bilateral cranial nerve VII-VIII  complexes appear within normal limits.  2. Mild presumed chronic small vessel ischemic disease.  3. Small volume right mastoid tip fluid.  Past Medical History -   Past Medical History:   Diagnosis Date     Herniated discs      Myalgia and myositis, unspecified     Fibromyalgia       Current Medications -   Current Outpatient Medications:      Calcium Carb-Cholecalciferol (CALCIUM + D3 PO), , Disp: , Rfl:      Cyanocobalamin (VITAMIN B-12 PO), , Disp: , Rfl:      hydrochlorothiazide (HYDRODIURIL) 25 MG tablet, TAKE ONE TABLET BY MOUTH ONCE DAILY, Disp: 30 tablet, Rfl: 3     Magnesium 80 MG TABS, , Disp: , Rfl:      meclizine (ANTIVERT) 25 MG tablet, Take 0.5-1 tablets (12.5-25 mg) by mouth 3 times daily as needed for dizziness, Disp: 30 tablet, Rfl: 3     NEW MED, VitaPulse, Disp: , Rfl:      Omega-3 Fatty Acids (OMEGA 3 PO), Take by mouth daily, Disp: , Rfl:     Allergies -   Allergies   Allergen Reactions     Codeine      Eggs [Eggs]      Milk Products      Nsaids      Intolerance       Social History -   Social History     Socioeconomic History     Marital status:      Spouse name: Oscar     Number of children: 3     Years of education: 16     Highest education level: Not on file   Occupational  History     Occupation: artist   Social Needs     Financial resource strain: Not on file     Food insecurity     Worry: Not on file     Inability: Not on file     Transportation needs     Medical: Not on file     Non-medical: Not on file   Tobacco Use     Smoking status: Former Smoker     Smokeless tobacco: Never Used     Tobacco comment: 1980-quit   Substance and Sexual Activity     Alcohol use: Yes     Alcohol/week: 0.0 standard drinks     Comment: occasional     Drug use: No     Sexual activity: Yes     Partners: Male   Lifestyle     Physical activity     Days per week: Not on file     Minutes per session: Not on file     Stress: Not on file   Relationships     Social connections     Talks on phone: Not on file     Gets together: Not on file     Attends [employer] service: Not on file     Active member of club or organization: Not on file     Attends meetings of clubs or organizations: Not on file     Relationship status: Not on file     Intimate partner violence     Fear of current or ex partner: Not on file     Emotionally abused: Not on file     Physically abused: Not on file     Forced sexual activity: Not on file   Other Topics Concern     Parent/sibling w/ CABG, MI or angioplasty before 65F 55M? Not Asked   Social History Narrative     Not on file       Family History -   Family History   Problem Relation Age of Onset     Hypertension Mother      Heart Disease Father         MI       Review of Systems - As per HPI and PMHx, otherwise review of system review of the head and neck negative. Otherwise 10+ review systems negative.    Physical Exam  LMP 05/13/2004   BMI: There is no height or weight on file to calculate BMI.    General - The patient is well nourished and well developed, and appears to have good nutritional status.  Alert and oriented to person and place, answers questions and cooperates with examination appropriately.    SKIN - No suspicious lesions or rashes.  Respiration - No respiratory  distress.  Head and Face - Normocephalic and atraumatic, with no gross asymmetry noted of the contour of the facial features.  The facial nerve is intact, with strong symmetric movements.    Voice and Breathing - The patient was breathing comfortably without the use of accessory muscles. The patients voice was clear and strong, and had appropriate pitch and quality.    Ears - Bilateral pinna and EACs with normal appearing overlying skin. Tympanic membrane intact with good mobility on pneumatic otoscopy bilaterally. Bony landmarks of the ossicular chain are normal. The tympanic membranes are normal in appearance. No retraction, perforation, or masses.  No fluid or purulence was seen in the external canal or the middle ear.     Eyes - Extraocular movements intact.  Sclera were not icteric or injected, conjunctiva were pink and moist.    Mouth - Examination of the oral cavity showed pink, healthy oral mucosa. No lesions or ulcerations noted.  The tongue was mobile and midline, and the dentition were in good condition.      Throat - The walls of the oropharynx were smooth, pink, moist, symmetric, and had no lesions or ulcerations.  The tonsillar pillars and soft palate were symmetric.    Neck - Normal midline excursion of the laryngotracheal complex during swallowing.  Full range of motion on passive movement.  Palpation of the occipital, submental, submandibular, internal jugular chain, and supraclavicular nodes did not demonstrate any abnormal lymph nodes or masses.  The carotid pulse was palpable bilaterally.  Palpation of the thyroid was soft and smooth, with no nodules or goiter appreciated.  The trachea was mobile and midline.    Nose - External contour is symmetric, no gross deflection or scars.  Nasal mucosa is pink and moist with no abnormal mucus.  The septum was midline and non-obstructive, turbinates of normal size and position.  No polyps, masses, or purulence noted on examination.    Neuro - Nonfocal neuro  exam is normal, CN 2 through 12 intact, normal gait and muscle tone.      Performed in clinic today:  No procedures preformed in clinic today          A/P - Stacie Hair is a 65 year old female Patient presents with:  Follow Up: vertigo        Patient with a nonspecific dizziness and vertigo but also some other neurologic symptoms of tremors and paresthesias related to intentional movements which do not have any other hallmarks of Parkinson's disease  associated with them.  Patient suggested to see neurology and the proper referral was made.  Patient also was recommended to see Dr. Sharon Colvin and neuro otologist at the emergency [place] for further evaluation of complex presentation of disequilibrium      I would like to see the patient back in about 3 months.      Fuentes Mina MD

## 2020-12-07 ENCOUNTER — OFFICE VISIT (OUTPATIENT)
Dept: OTOLARYNGOLOGY | Facility: CLINIC | Age: 65
End: 2020-12-07
Payer: COMMERCIAL

## 2020-12-07 VITALS
WEIGHT: 172 LBS | BODY MASS INDEX: 31.65 KG/M2 | SYSTOLIC BLOOD PRESSURE: 126 MMHG | DIASTOLIC BLOOD PRESSURE: 80 MMHG | HEIGHT: 62 IN

## 2020-12-07 DIAGNOSIS — R25.1 TREMOR: ICD-10-CM

## 2020-12-07 DIAGNOSIS — R42 DIZZINESS: Primary | ICD-10-CM

## 2020-12-07 DIAGNOSIS — R42 VERTIGO: ICD-10-CM

## 2020-12-07 PROCEDURE — 99214 OFFICE O/P EST MOD 30 MIN: CPT | Performed by: OTOLARYNGOLOGY

## 2020-12-07 ASSESSMENT — MIFFLIN-ST. JEOR: SCORE: 1283.44

## 2020-12-07 NOTE — LETTER
12/7/2020         RE: Stacie Hair  [address]  [facility] [id number]        Dear Colleague,    Thank you for referring your patient, Stacie Hair, to the [facility]. Please see a copy of my visit note below.    History of Present Illness - Stacie Hair is a 65 year old female presents for evaluation of her dizziness.  She was found to have unilateral weakness over the vestibular system on the left at 66%.  No other significant findings were noted on the [facility] thorough evaluation.  There was a initially question of potential semicircular canal dehiscence but the appropriate CT scan did not show any evidence of this potential problem the MRI of the brain of course not show any pathology even though patient now complains that the she still feels some vertigo and disequilibrium only when she moves in spite of the fact that positional testing was normal.  She definitely does better with the function during the day otherwise after physical therapy and with taking oxazepam once daily at the low dose.  She also admits to the fact that when she tries to busy she gets some tremors in her extremities specially her hands and some paresthesias.  She does not admit to significant anxiety.  She has never had full neuro neurologic evaluation.    MRI BRAIN WITHOUT AND WITH CONTRAST  7/17/2020 2:26 PM      HISTORY:  Epidemic vertigo.      TECHNIQUE:  Multiplanar, multisequence MRI of the brain without and  with 7.5 mL Gadavist.      COMPARISON: CT head 8/27/2018.     FINDINGS: There is no evidence of acute infarct, hemorrhage, mass, or  herniation. Brain parenchymal morphology and volume is normal for age.  Mild scattered punctate and patchy foci of T2 and T2 FLAIR  hyperintense signal within the periventricular and deep more than  subcortical cerebral white matter, presumably representing chronic  small vessel ischemic disease. No abnormal intracranial  postcontrast  enhancement.     Special attention to the bilateral cranial nerve VII-VIII complexes  demonstrates no mass lesion. There is minimal enhancement in the  fundus of internal auditory canals bilaterally which may be vascular  in nature, as no corresponding soft tissue lesion/nodule is seen on  the thin section highly T2 weighted images (series 8). The bilateral  cerebellopontine angle cisterns and internal auditory canals appear  within normal limits.     There is a small right mastoid tip effusion. Mild to moderate mucosal  thickening in the bilateral ethmoid air cells. Orbits appear normal.  The calvarium, skull base and mid face otherwise appear unremarkable.  The major vascular flow voids at the skull base appear to be  maintained.                                                                      IMPRESSION:  1. No mass lesion identified in the internal auditory canals or  cerebellopontine angle cisterns. The bilateral cranial nerve VII-VIII  complexes appear within normal limits.  2. Mild presumed chronic small vessel ischemic disease.  3. Small volume right mastoid tip fluid.  Past Medical History -   Past Medical History:   Diagnosis Date     Herniated discs      Myalgia and myositis, unspecified     Fibromyalgia       Current Medications -   Current Outpatient Medications:      Calcium Carb-Cholecalciferol (CALCIUM + D3 PO), , Disp: , Rfl:      Cyanocobalamin (VITAMIN B-12 PO), , Disp: , Rfl:      hydrochlorothiazide (HYDRODIURIL) 25 MG tablet, TAKE ONE TABLET BY MOUTH ONCE DAILY, Disp: 30 tablet, Rfl: 3     Magnesium 80 MG TABS, , Disp: , Rfl:      meclizine (ANTIVERT) 25 MG tablet, Take 0.5-1 tablets (12.5-25 mg) by mouth 3 times daily as needed for dizziness, Disp: 30 tablet, Rfl: 3     NEW MED, VitaPulse, Disp: , Rfl:      Omega-3 Fatty Acids (OMEGA 3 PO), Take by mouth daily, Disp: , Rfl:     Allergies -   Allergies   Allergen Reactions     Codeine      Eggs [Eggs]      Milk Products       Nsaids      Intolerance       Social History -   Social History     Socioeconomic History     Marital status:      Spouse name: Oscar     Number of children: 3     Years of education: 16     Highest education level: Not on file   Occupational History     Occupation: artist   Social Needs     Financial resource strain: Not on file     Food insecurity     Worry: Not on file     Inability: Not on file     Transportation needs     Medical: Not on file     Non-medical: Not on file   Tobacco Use     Smoking status: Former Smoker     Smokeless tobacco: Never Used     Tobacco comment: 1980-quit   Substance and Sexual Activity     Alcohol use: Yes     Alcohol/week: 0.0 standard drinks     Comment: occasional     Drug use: No     Sexual activity: Yes     Partners: Male   Lifestyle     Physical activity     Days per week: Not on file     Minutes per session: Not on file     Stress: Not on file   Relationships     Social connections     Talks on phone: Not on file     Gets together: Not on file     Attends Evangelical service: Not on file     Active member of club or organization: Not on file     Attends meetings of clubs or organizations: Not on file     Relationship status: Not on file     Intimate partner violence     Fear of current or ex partner: Not on file     Emotionally abused: Not on file     Physically abused: Not on file     Forced sexual activity: Not on file   Other Topics Concern     Parent/sibling w/ CABG, MI or angioplasty before 65F 55M? Not Asked   Social History Narrative     Not on file       Family History -   Family History   Problem Relation Age of Onset     Hypertension Mother      Heart Disease Father         MI       Review of Systems - As per HPI and PMHx, otherwise review of system review of the head and neck negative. Otherwise 10+ review systems negative.    Physical Exam  LMP 05/13/2004   BMI: There is no height or weight on file to calculate BMI.    General - The patient is well nourished  and well developed, and appears to have good nutritional status.  Alert and oriented to person and place, answers questions and cooperates with examination appropriately.    SKIN - No suspicious lesions or rashes.  Respiration - No respiratory distress.  Head and Face - Normocephalic and atraumatic, with no gross asymmetry noted of the contour of the facial features.  The facial nerve is intact, with strong symmetric movements.    Voice and Breathing - The patient was breathing comfortably without the use of accessory muscles. The patients voice was clear and strong, and had appropriate pitch and quality.    Ears - Bilateral pinna and EACs with normal appearing overlying skin. Tympanic membrane intact with good mobility on pneumatic otoscopy bilaterally. Bony landmarks of the ossicular chain are normal. The tympanic membranes are normal in appearance. No retraction, perforation, or masses.  No fluid or purulence was seen in the external canal or the middle ear.     Eyes - Extraocular movements intact.  Sclera were not icteric or injected, conjunctiva were pink and moist.    Mouth - Examination of the oral cavity showed pink, healthy oral mucosa. No lesions or ulcerations noted.  The tongue was mobile and midline, and the dentition were in good condition.      Throat - The walls of the oropharynx were smooth, pink, moist, symmetric, and had no lesions or ulcerations.  The tonsillar pillars and soft palate were symmetric.    Neck - Normal midline excursion of the laryngotracheal complex during swallowing.  Full range of motion on passive movement.  Palpation of the occipital, submental, submandibular, internal jugular chain, and supraclavicular nodes did not demonstrate any abnormal lymph nodes or masses.  The carotid pulse was palpable bilaterally.  Palpation of the thyroid was soft and smooth, with no nodules or goiter appreciated.  The trachea was mobile and midline.    Nose - External contour is symmetric, no  gross deflection or scars.  Nasal mucosa is pink and moist with no abnormal mucus.  The septum was midline and non-obstructive, turbinates of normal size and position.  No polyps, masses, or purulence noted on examination.    Neuro - Nonfocal neuro exam is normal, CN 2 through 12 intact, normal gait and muscle tone.      Performed in clinic today:  No procedures preformed in clinic today          A/P - Stacie Hair is a 65 year old female Patient presents with:  Follow Up: vertigo        Patient with a nonspecific dizziness and vertigo but also some other neurologic symptoms of tremors and paresthesias related to intentional movements which do not have any other hallmarks of Parkinson's disease  associated with them.  Patient suggested to see neurology and the proper referral was made.  Patient also was recommended to see Dr. Sharon Colvin and neuro otologist at the emergency [place] for further evaluation of complex presentation of disequilibrium      I would like to see the patient back in about 3 months.      Fuentes Mina MD          Again, thank you for allowing me to participate in the care of your patient.        Sincerely,        Fuentes Mina MD, MD

## 2020-12-09 ENCOUNTER — TELEPHONE (OUTPATIENT)
Dept: OTOLARYNGOLOGY | Facility: CLINIC | Age: 65
End: 2020-12-09

## 2020-12-09 NOTE — TELEPHONE ENCOUNTER
1. Have you noticed any changes in hearing? Yes  2. Do you have ringing, buzzing, or other sounds in your ears or head, this is also referred to as Tinnitus? Yes  3. When and where was your last hearing test? 4/2020 [facility]   4. Do you feel lightheaded or foggy? Yes  5. Do you have a spinning sensation? Yes  6. Is there any specific position that can bring on dizziness? random  7. Does looking up cause dizziness? Yes  8. Does getting in and our of bed cause dizziness? Yes  9. Does turning over in bed increase or cause dizziness? Yes  10. Does bending over cause dizziness? Yes  11. Is there anything that you can do to prevent the dizziness? Has done therapy  12. Has the dizziness gotten better with time? No  13. Have you seen Physical Therapy for dizziness? (Please indicate clinic and as much of the location as possible): Yes, If yes, where? [facility]  if yes, who?   14. Are you being referred to a specific physician? No  15. Have you been evaluated/treated for your dizziness at any other location?  (If yes,obtian as much clinic/provider/locaiton as possible) Yes. (If yes answer the following questions:)   Have you seen any ENT, Neurology, or other providers for these symptoms?             Yes, If yes, where? [facility], [facility] if yes, who?    Have you had any balance or Audiology testing? Yes, If yes, where? [facility] if yes, who?  Have you had an MRI or CT scan of your head or neck? Yes, If yes, where? [facility] if yes, who?     Would you like to receive your Release of Information by mail or e-mail?  mail

## 2020-12-10 NOTE — TELEPHONE ENCOUNTER
FUTURE VISIT INFORMATION      FUTURE VISIT INFORMATION:    Date: 2/16/2021    Time: 1:30PM    [place] [facility] [place]  REFERRAL INFORMATION:    Referring provider:  Fuentes Mina MD    Referring providers clinic:  [facility] E[facility]     Reason for visit/diagnosis  Vertigo- Referred by Fuentes Mina MD in  ENT    RECORDS REQUESTED FROM:       Clinic name Comments Records Status Imaging Status   [facility] E[facility] & Audiology  12/7/2020 note and referral from Fuentes Mina MD  7/16/2020 audiogram  [facility] 7/28/2020 VNG & testing  Notes scanned in Epic     Raw data req 12/10    [facility] therapy  10/6/2020-12/2/2020 OT notes from Artemio Savage, PT    6/24/2020 PT note from Neeta Chan, PT    [facility]    Imaging 9/23/2020 CT Temporal bone  7/17/2020 MR brain   8/27/18 CT head [facility] PACS   [facility]  6/22/2020 note from Schoen, Gregory G, MD EPIC            12/10/2020 8:43AM sent a fax to [facility] for graphs - Amay   12/14/2020 2:48PM spoke with [facility] to see if they are going to send the raw data, med recs rep unsure because she doesn't know who faxes them. She sent a message to their supervisor. Sent a message to audiology to review the recs available - Amay

## 2020-12-17 DIAGNOSIS — R42 DIZZINESS: Primary | ICD-10-CM
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
## 2020-12-22 ENCOUNTER — HOSPITAL ENCOUNTER (OUTPATIENT)
Dept: PHYSICAL THERAPY | Facility: CLINIC | Age: 65
Setting detail: THERAPIES SERIES
End: 2020-12-22
Attending: OTOLARYNGOLOGY
Payer: COMMERCIAL

## 2020-12-22 PROCEDURE — 97530 THERAPEUTIC ACTIVITIES: CPT | Mod: GP | Performed by: PHYSICAL THERAPIST

## 2020-12-22 PROCEDURE — 97110 THERAPEUTIC EXERCISES: CPT | Mod: GP | Performed by: PHYSICAL THERAPIST

## 2021-01-05 NOTE — PROGRESS NOTES
Outpatient Physical Therapy Progress Note     Patient: Stacie Hair  : 1955    Beginning/End Dates of Reporting Period:  10/6/20 to 2021 (pt seen for 10 PT visits from 10/6 to 20)    Referring Provider: Fuentes Mina MD    Therapy Diagnosis: dizziness, mechanical neck pain with R>L upper extremity and headache referral     Client Self Report: Neck looser and less sx with neck ROM but still has dizziness sx. Will go to [facility] for dizziness in February, will see specialist for neck, no MRI. Neck is more relaxed. Did get shoulder brace and has helped. [facility]. Hasn't had a migraine in awhile. Shoulder better, can now pull up covers easier when pt reported at last visit on 20.    Objective Measurements:  Objective Measure: average pain level (at eval on 10/6/20 neck 3/10, head 5/10, UE 4/10)  Details: neck and UE sx usually 0/10  Objective Measure: frequency (at eval head sx constant, neck constant, UE sx constant)  Details: head sx no longer constant, 30-50% of day head, neck and UE sx rare  Objective Measure: number of times waking at night due to pain  Details: a number of nights not waking due to sx      Outcome Measures (most recent score):   DI 56 at eval on 10/6/20  DI 38 on 20     Goals:  Goal Identifier pain   Goal Description pt will note a decrease average pain by 50% or better and carry over to improved sleep by waking 50% less often due to pain   Target Date 20   Date Met  20   Progress:     Goal Identifier function   Goal Description pt will note a decrease in dizziness sx and further pain decrease and carry over to improved functional level as measured by DI score decrease from 56 to 30 or less   Target Date 20   Date Met      Progress:       Progress Toward Goals:   Progress this reporting period: Overall sx less but progressing slowing, meeting goal 1 and making progress toward goal 2 as she is now at 38 for  dizziness inventory, goal was 30 or less.    Plan:  Changes to therapy plan of care: initial plan of care was for 12 weeks but plan to extend PT for another 2-3 months but we are decreasing frequency to every 3 weeks or so. Pt has chronic sx so has been a slow responder.    Discharge:  No

## 2021-01-17 ENCOUNTER — DOCUMENTATION ONLY (OUTPATIENT)
Dept: CARE COORDINATION | Facility: CLINIC | Age: 66
End: 2021-01-17

## 2021-01-18 ENCOUNTER — TELEPHONE (OUTPATIENT)
Dept: FAMILY MEDICINE | Facility: CLINIC | Age: 66
End: 2021-01-18

## 2021-01-18 NOTE — TELEPHONE ENCOUNTER
Patient is requesting refill of Oxazepam 10mg, 1 capsule bid prn anxiety/ vertigo, Qty 30 last filled on 12/22/20.  Drug not on med list.    Thank you!

## 2021-01-19 ENCOUNTER — HOSPITAL ENCOUNTER (OUTPATIENT)
Dept: PHYSICAL THERAPY | Facility: CLINIC | Age: 66
Setting detail: THERAPIES SERIES
End: 2021-01-19
Attending: OTOLARYNGOLOGY
Payer: COMMERCIAL

## 2021-01-19 PROCEDURE — 97530 THERAPEUTIC ACTIVITIES: CPT | Mod: GP | Performed by: PHYSICAL THERAPIST

## 2021-01-19 PROCEDURE — 97112 NEUROMUSCULAR REEDUCATION: CPT | Mod: GP | Performed by: PHYSICAL THERAPIST

## 2021-01-19 PROCEDURE — 97110 THERAPEUTIC EXERCISES: CPT | Mod: GP | Performed by: PHYSICAL THERAPIST

## 2021-01-19 RX ORDER — OXAZEPAM 10 MG
10 CAPSULE ORAL
Qty: 30 CAPSULE | Refills: 3 | Status: SHIPPED | OUTPATIENT
Start: 2021-01-19 | End: 2021-02-18

## 2021-01-20 DIAGNOSIS — I10 BENIGN ESSENTIAL HYPERTENSION: ICD-10-CM

## 2021-01-20 RX ORDER — HYDROCHLOROTHIAZIDE 25 MG/1
TABLET ORAL
Qty: 30 TABLET | Refills: 1 | Status: SHIPPED | OUTPATIENT
Start: 2021-01-20 | End: 2021-03-23

## 2021-01-20 NOTE — TELEPHONE ENCOUNTER
Routing refill request to provider for review/approval because:  Labs not current:  Creatinine, Potassium, Sodium    Alea Allison RN
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
## 2021-02-02 ENCOUNTER — OFFICE VISIT (OUTPATIENT)
Dept: AUDIOLOGY | Facility: CLINIC | Age: 66
End: 2021-02-02
Payer: COMMERCIAL

## 2021-02-02 DIAGNOSIS — R42 DIZZINESS AND GIDDINESS: Primary | ICD-10-CM

## 2021-02-02 PROCEDURE — 92545 OSCILLATING TRACKING TEST: CPT | Mod: 59 | Performed by: AUDIOLOGIST

## 2021-02-02 PROCEDURE — 92546 SINUSOIDAL ROTATIONAL TEST: CPT | Performed by: AUDIOLOGIST

## 2021-02-02 PROCEDURE — 92537 CALORIC VSTBLR TEST W/REC: CPT | Performed by: AUDIOLOGIST

## 2021-02-02 PROCEDURE — 92567 TYMPANOMETRY: CPT | Performed by: AUDIOLOGIST

## 2021-02-02 PROCEDURE — 92541 SPONTANEOUS NYSTAGMUS TEST: CPT | Performed by: AUDIOLOGIST

## 2021-02-02 PROCEDURE — 92542 POSITIONAL NYSTAGMUS TEST: CPT | Mod: 59 | Performed by: AUDIOLOGIST

## 2021-02-02 NOTE — Clinical Note
Pt positive for BPPV and borderline left hypofunction. Recommend referral to vestibular physical therapy.

## 2021-02-03 DIAGNOSIS — H81.10 BPPV (BENIGN PAROXYSMAL POSITIONAL VERTIGO): ICD-10-CM

## 2021-02-03 DIAGNOSIS — R42 DIZZINESS: Primary | ICD-10-CM

## 2021-02-05 NOTE — PROGRESS NOTES
"History of Present Illness - Stacie Hair is a 65 year old female presenting in clinic today for a recheck on Patient presents with:  RECHECK: 4 month follow up     Patient with nonspecific vertigo and dizziness presents in follow-up.  Initially was treated for BPPV successfully then had a lot of residual dizziness disequilibrium no true vertigo no positional components.  MRI of the brain was done in July was normal so was the CT scan of the temporal bones since there was a concern about potential superior semicircular canal dehiscence.  None was found on the imaging studies.  She feels \"foggy\" lately difficulty concentrating.  Even though denies any specific aura but may have a bit of phonophobia and maybe slight photophobia.  Has had migraines more classic migraines in the past.  She takes oxazepam now and that helps.  Vertical component but again still has some residual dizziness.      There is no height or weight on file to calculate BMI.    Weight management plan: Patient was referred to their PCP to discuss a diet and exercise plan.    BP Readings from Last 1 Encounters:   02/08/21 130/64       BP noted to be well controlled today in office.     Stacie IS NOT a smoker/uses chewing tobacco.      Past Medical History -   Past Medical History:   Diagnosis Date     Herniated discs      Myalgia and myositis, unspecified     Fibromyalgia       Current Medications -   Current Outpatient Medications:      hydrochlorothiazide (HYDRODIURIL) 25 MG tablet, TAKE ONE TABLET BY MOUTH ONCE DAILY, Disp: 30 tablet, Rfl: 1     Magnesium 80 MG TABS, , Disp: , Rfl:      meclizine (ANTIVERT) 25 MG tablet, Take 0.5-1 tablets (12.5-25 mg) by mouth 3 times daily as needed for dizziness, Disp: 30 tablet, Rfl: 3     Omega-3 Fatty Acids (OMEGA 3 PO), Take by mouth daily, Disp: , Rfl:      oxazepam (SERAX) 10 MG capsule, Take 1 capsule (10 mg) by mouth nightly as needed for anxiety (Vertigo), Disp: 30 capsule, Rfl: 3     Calcium " Carb-Cholecalciferol (CALCIUM + D3 PO), , Disp: , Rfl:      Cyanocobalamin (VITAMIN B-12 PO), , Disp: , Rfl:      NEW MED, VitaPulse, Disp: , Rfl:     Allergies -   Allergies   Allergen Reactions     Codeine      Eggs [Eggs]      Milk Products      Nsaids      Intolerance       Social History -   Social History     Socioeconomic History     Marital status:      Spouse name: Oscar     Number of children: 3     Years of education: 16     Highest education level: Not on file   Occupational History     Occupation: artist   Social Needs     Financial resource strain: Not on file     Food insecurity     Worry: Not on file     Inability: Not on file     Transportation needs     Medical: Not on file     Non-medical: Not on file   Tobacco Use     Smoking status: Former Smoker     Smokeless tobacco: Never Used     Tobacco comment: 1980-quit   Substance and Sexual Activity     Alcohol use: Yes     Alcohol/week: 0.0 standard drinks     Comment: occasional     Drug use: No     Sexual activity: Yes     Partners: Male   Lifestyle     Physical activity     Days per week: Not on file     Minutes per session: Not on file     Stress: Not on file   Relationships     Social connections     Talks on phone: Not on file     Gets together: Not on file     Attends [employer] service: Not on file     Active member of club or organization: Not on file     Attends meetings of clubs or organizations: Not on file     Relationship status: Not on file     Intimate partner violence     Fear of current or ex partner: Not on file     Emotionally abused: Not on file     Physically abused: Not on file     Forced sexual activity: Not on file   Other Topics Concern     Parent/sibling w/ CABG, MI or angioplasty before 65F 55M? Not Asked   Social History Narrative     Not on file       Family History -   Family History   Problem Relation Age of Onset     Hypertension Mother      Heart Disease Father         MI       Review of Systems - As per HPI and  PMHx, otherwise review of system review of the head and neck negative. Otherwise 10+ review of system is negative    Physical Exam  /64   Temp 97.7  F (36.5  C) (Temporal)   LMP 05/13/2004   BMI: There is no height or weight on file to calculate BMI.    General - The patient is well nourished and well developed, and appears to have good nutritional status.  Alert and oriented to person and place, answers questions and cooperates with examination appropriately.    SKIN - No suspicious lesions or rashes.  Respiration - No respiratory distress.  Head and Face - Normocephalic and atraumatic, with no gross asymmetry noted of the contour of the facial features.  The facial nerve is intact, with strong symmetric movements.    Voice and Breathing - The patient was breathing comfortably without the use of accessory muscles. The patients voice was clear and strong, and had appropriate pitch and quality.    Ears - Bilateral pinna and EACs with normal appearing overlying skin. Tympanic membrane intact with good mobility on pneumatic otoscopy bilaterally. Bony landmarks of the ossicular chain are normal. The tympanic membranes are normal in appearance. No retraction, perforation, or masses.  No fluid or purulence was seen in the external canal or the middle ear.     Eyes - Extraocular movements intact.  Sclera were not icteric or injected, conjunctiva were pink and moist.    Mouth - Examination of the oral cavity showed pink, healthy oral mucosa. No lesions or ulcerations noted.  The tongue was mobile and midline, and the dentition were in good condition.      Throat - The walls of the oropharynx were smooth, pink, moist, symmetric, and had no lesions or ulcerations.  The tonsillar pillars and soft palate were symmetric. Tonsils are symmetric. The uvula was midline on elevation.    Neck - Normal midline excursion of the laryngotracheal complex during swallowing.  Full range of motion on passive movement.  Palpation of  the occipital, submental, submandibular, internal jugular chain, and supraclavicular nodes did not demonstrate any abnormal lymph nodes or masses.  The carotid pulse was palpable bilaterally.  Palpation of the thyroid was soft and smooth, with no nodules or goiter appreciated.  The trachea was mobile and midline.    Nose - External contour is symmetric, no gross deflection or scars.  Nasal mucosa is pink and moist with no abnormal mucus.  The septum was midline and non-obstructive, turbinates of normal size and position.  No polyps, masses, or purulence noted on examination.    Neuro - Nonfocal neuro exam is normal, CN 2 through 12 intact, normal gait and muscle tone.  No nystagmus noted.  Romberg is negative.      Performed in clinic today:  Audiologic Studies - An audiogram and tympanogram were performed today as part of the evaluation and personally reviewed. The tympanogram shows Type As curves on the right and Type A curves on the left, with Normal canal volumes and middle ear pressures.  The audiogram showed Mild high-frequency SNHL on the right and Mild high-frequency SNHLon the left.    100% word recognition score bilaterally.    A/P - Stacie Hair is a 65 year old female Patient presents with:  RECHECK: 4 month follow up     Patient with dizziness nonspecific vertigo largely nonpositional currently.  It appears most migrainous in nature at this point.  She is supposed to see neurology shortly.  In the meantime we will start her on topiramate just to 25 mg once daily to see how she does with some of the other residual symptoms of dizziness and difficulty concentrating.  Virtual visit via phone will be conducted in 2 weeks to discuss her visit with neurology as well as the impact of topiramate in his symptoms.  In regard to hearing we discussed some hearing protection rechecking the hearing in the ear.    Fuentes Mina MD

## 2021-02-08 ENCOUNTER — HOSPITAL ENCOUNTER (OUTPATIENT)
Dept: PHYSICAL THERAPY | Facility: CLINIC | Age: 66
Setting detail: THERAPIES SERIES
End: 2021-02-08
Attending: OTOLARYNGOLOGY
Payer: COMMERCIAL

## 2021-02-08 ENCOUNTER — OFFICE VISIT (OUTPATIENT)
Dept: AUDIOLOGY | Facility: CLINIC | Age: 66
End: 2021-02-08
Payer: COMMERCIAL

## 2021-02-08 ENCOUNTER — OFFICE VISIT (OUTPATIENT)
Dept: OTOLARYNGOLOGY | Facility: CLINIC | Age: 66
End: 2021-02-08
Payer: COMMERCIAL

## 2021-02-08 VITALS — DIASTOLIC BLOOD PRESSURE: 64 MMHG | TEMPERATURE: 97.7 F | SYSTOLIC BLOOD PRESSURE: 130 MMHG

## 2021-02-08 DIAGNOSIS — H93.13 TINNITUS OF BOTH EARS: ICD-10-CM

## 2021-02-08 DIAGNOSIS — R42 DIZZINESS: ICD-10-CM

## 2021-02-08 DIAGNOSIS — R42 DIZZINESS AND GIDDINESS: ICD-10-CM

## 2021-02-08 DIAGNOSIS — G43.009 MIGRAINE WITHOUT AURA AND WITHOUT STATUS MIGRAINOSUS, NOT INTRACTABLE: Primary | ICD-10-CM

## 2021-02-08 DIAGNOSIS — H81.10 BPPV (BENIGN PAROXYSMAL POSITIONAL VERTIGO): ICD-10-CM

## 2021-02-08 DIAGNOSIS — H90.3 SENSORINEURAL HEARING LOSS, BILATERAL: Primary | ICD-10-CM

## 2021-02-08 PROCEDURE — 99207 PR NO CHARGE LOS: CPT | Performed by: AUDIOLOGIST

## 2021-02-08 PROCEDURE — 92550 TYMPANOMETRY & REFLEX THRESH: CPT | Performed by: AUDIOLOGIST

## 2021-02-08 PROCEDURE — 97110 THERAPEUTIC EXERCISES: CPT | Mod: GP | Performed by: PHYSICAL THERAPIST

## 2021-02-08 PROCEDURE — 99214 OFFICE O/P EST MOD 30 MIN: CPT | Performed by: OTOLARYNGOLOGY

## 2021-02-08 PROCEDURE — 92557 COMPREHENSIVE HEARING TEST: CPT | Performed by: AUDIOLOGIST

## 2021-02-08 PROCEDURE — 97112 NEUROMUSCULAR REEDUCATION: CPT | Mod: GP | Performed by: PHYSICAL THERAPIST

## 2021-02-08 RX ORDER — TOPIRAMATE 25 MG/1
25 TABLET, FILM COATED ORAL DAILY
Qty: 30 TABLET | Refills: 0 | Status: SHIPPED | OUTPATIENT
Start: 2021-02-08 | End: 2021-03-01 | Stop reason: SINTOL

## 2021-02-08 NOTE — PROGRESS NOTES
AUDIOLOGY REPORT     SUMMARY: Audiology visit completed. See audiogram for results.     RECOMMENDATIONS: Follow-up with ENT    Disha Reaves Licensed Audiologist #[id number]

## 2021-02-08 NOTE — LETTER
"    2/8/2021         RE: Stacie Hair  [address] [facility] [address]        Dear Colleague,    Thank you for referring your patient, Stacie Hair, to the [facility]. Please see a copy of my visit note below.    History of Present Illness - Stacie Hair is a 65 year old female presenting in clinic today for a recheck on Patient presents with:  RECHECK: 4 month follow up     Patient with nonspecific vertigo and dizziness presents in follow-up.  Initially was treated for BPPV successfully then had a lot of residual dizziness disequilibrium no true vertigo no positional components.  MRI of the brain was done in July was normal so was the CT scan of the temporal bones since there was a concern about potential superior semicircular canal dehiscence.  None was found on the imaging studies.  She feels \"foggy\" lately difficulty concentrating.  Even though denies any specific aura but may have a bit of phonophobia and maybe slight photophobia.  Has had migraines more classic migraines in the past.  She takes oxazepam now and that helps.  Vertical component but again still has some residual dizziness.      There is no height or weight on file to calculate BMI.    Weight management plan: Patient was referred to their PCP to discuss a diet and exercise plan.    BP Readings from Last 1 Encounters:   02/08/21 130/64       BP noted to be well controlled today in office.     Stacie IS NOT a smoker/uses chewing tobacco.      Past Medical History -   Past Medical History:   Diagnosis Date     Herniated discs      Myalgia and myositis, unspecified     Fibromyalgia       Current Medications -   Current Outpatient Medications:      hydrochlorothiazide (HYDRODIURIL) 25 MG tablet, TAKE ONE TABLET BY MOUTH ONCE DAILY, Disp: 30 tablet, Rfl: 1     Magnesium 80 MG TABS, , Disp: , Rfl:      meclizine (ANTIVERT) 25 MG tablet, Take 0.5-1 tablets (12.5-25 mg) by mouth 3 times daily as needed for " dizziness, Disp: 30 tablet, Rfl: 3     Omega-3 Fatty Acids (OMEGA 3 PO), Take by mouth daily, Disp: , Rfl:      oxazepam (SERAX) 10 MG capsule, Take 1 capsule (10 mg) by mouth nightly as needed for anxiety (Vertigo), Disp: 30 capsule, Rfl: 3     Calcium Carb-Cholecalciferol (CALCIUM + D3 PO), , Disp: , Rfl:      Cyanocobalamin (VITAMIN B-12 PO), , Disp: , Rfl:      NEW MED, VitaPulse, Disp: , Rfl:     Allergies -   Allergies   Allergen Reactions     Codeine      Eggs [Eggs]      Milk Products      Nsaids      Intolerance       Social History -   Social History     Socioeconomic History     Marital status:      Spouse name: Oscar     Number of children: 3     Years of education: 16     Highest education level: Not on file   Occupational History     Occupation: artist   Social Needs     Financial resource strain: Not on file     Food insecurity     Worry: Not on file     Inability: Not on file     Transportation needs     Medical: Not on file     Non-medical: Not on file   Tobacco Use     Smoking status: Former Smoker     Smokeless tobacco: Never Used     Tobacco comment: 1980-quit   Substance and Sexual Activity     Alcohol use: Yes     Alcohol/week: 0.0 standard drinks     Comment: occasional     Drug use: No     Sexual activity: Yes     Partners: Male   Lifestyle     Physical activity     Days per week: Not on file     Minutes per session: Not on file     Stress: Not on file   Relationships     Social connections     Talks on phone: Not on file     Gets together: Not on file     Attends Baptism service: Not on file     Active member of club or organization: Not on file     Attends meetings of clubs or organizations: Not on file     Relationship status: Not on file     Intimate partner violence     Fear of current or ex partner: Not on file     Emotionally abused: Not on file     Physically abused: Not on file     Forced sexual activity: Not on file   Other Topics Concern     Parent/sibling w/ CABG, MI or  angioplasty before 65F 55M? Not Asked   Social History Narrative     Not on file       Family History -   Family History   Problem Relation Age of Onset     Hypertension Mother      Heart Disease Father         MI       Review of Systems - As per HPI and PMHx, otherwise review of system review of the head and neck negative. Otherwise 10+ review of system is negative    Physical Exam  /64   Temp 97.7  F (36.5  C) (Temporal)   LMP 05/13/2004   BMI: There is no height or weight on file to calculate BMI.    General - The patient is well nourished and well developed, and appears to have good nutritional status.  Alert and oriented to person and place, answers questions and cooperates with examination appropriately.    SKIN - No suspicious lesions or rashes.  Respiration - No respiratory distress.  Head and Face - Normocephalic and atraumatic, with no gross asymmetry noted of the contour of the facial features.  The facial nerve is intact, with strong symmetric movements.    Voice and Breathing - The patient was breathing comfortably without the use of accessory muscles. The patients voice was clear and strong, and had appropriate pitch and quality.    Ears - Bilateral pinna and EACs with normal appearing overlying skin. Tympanic membrane intact with good mobility on pneumatic otoscopy bilaterally. Bony landmarks of the ossicular chain are normal. The tympanic membranes are normal in appearance. No retraction, perforation, or masses.  No fluid or purulence was seen in the external canal or the middle ear.     Eyes - Extraocular movements intact.  Sclera were not icteric or injected, conjunctiva were pink and moist.    Mouth - Examination of the oral cavity showed pink, healthy oral mucosa. No lesions or ulcerations noted.  The tongue was mobile and midline, and the dentition were in good condition.      Throat - The walls of the oropharynx were smooth, pink, moist, symmetric, and had no lesions or ulcerations.   The tonsillar pillars and soft palate were symmetric. Tonsils are symmetric. The uvula was midline on elevation.    Neck - Normal midline excursion of the laryngotracheal complex during swallowing.  Full range of motion on passive movement.  Palpation of the occipital, submental, submandibular, internal jugular chain, and supraclavicular nodes did not demonstrate any abnormal lymph nodes or masses.  The carotid pulse was palpable bilaterally.  Palpation of the thyroid was soft and smooth, with no nodules or goiter appreciated.  The trachea was mobile and midline.    Nose - External contour is symmetric, no gross deflection or scars.  Nasal mucosa is pink and moist with no abnormal mucus.  The septum was midline and non-obstructive, turbinates of normal size and position.  No polyps, masses, or purulence noted on examination.    Neuro - Nonfocal neuro exam is normal, CN 2 through 12 intact, normal gait and muscle tone.  No nystagmus noted.  Romberg is negative.      Performed in clinic today:  Audiologic Studies - An audiogram and tympanogram were performed today as part of the evaluation and personally reviewed. The tympanogram shows Type As curves on the right and Type A curves on the left, with Normal canal volumes and middle ear pressures.  The audiogram showed Mild high-frequency SNHL on the right and Mild high-frequency SNHLon the left.    100% word recognition score bilaterally.    A/P - Stacie Hair is a 65 year old female Patient presents with:  RECHECK: 4 month follow up     Patient with dizziness nonspecific vertigo largely nonpositional currently.  It appears most migrainous in nature at this point.  She is supposed to see neurology shortly.  In the meantime we will start her on topiramate just to 25 mg once daily to see how she does with some of the other residual symptoms of dizziness and difficulty concentrating.  Virtual visit via phone will be conducted in 2 weeks to discuss her visit with  neurology as well as the impact of topiramate in his symptoms.  In regard to hearing we discussed some hearing protection rechecking the hearing in the ear.    Fuentes Mina MD            Again, thank you for allowing me to participate in the care of your patient.        Sincerely,        Fuentes Mina MD, MD

## 2021-02-10 NOTE — PATIENT INSTRUCTIONS
1. You were seen in the ENT Clinic today by Dr. Nissen.  If you have any questions or concerns after your appointment, please call   - Option 1: ENT Clinic: [phone]   - Option 2: Eunice (Dr. Nissen's Nurse): [phone]    2.   Plan to return to clinic as needed    3. Referral to Nicole Novoa in Neurology    Eunice Prince LPN  [facility] - Otolaryngology

## 2021-02-16 ENCOUNTER — PRE VISIT (OUTPATIENT)
Dept: OTOLARYNGOLOGY | Facility: CLINIC | Age: 66
End: 2021-02-16

## 2021-02-16 ENCOUNTER — OFFICE VISIT (OUTPATIENT)
Dept: OTOLARYNGOLOGY | Facility: CLINIC | Age: 66
End: 2021-02-16
Payer: COMMERCIAL

## 2021-02-16 VITALS
HEART RATE: 89 BPM | WEIGHT: 169.75 LBS | HEIGHT: 62 IN | OXYGEN SATURATION: 97 % | TEMPERATURE: 97.7 F | BODY MASS INDEX: 31.24 KG/M2 | SYSTOLIC BLOOD PRESSURE: 124 MMHG | DIASTOLIC BLOOD PRESSURE: 80 MMHG | RESPIRATION RATE: 14 BRPM

## 2021-02-16 DIAGNOSIS — G43.809 VESTIBULAR MIGRAINE: Primary | ICD-10-CM

## 2021-02-16 DIAGNOSIS — H93.13 TINNITUS, BILATERAL: ICD-10-CM

## 2021-02-16 DIAGNOSIS — H90.3 BILATERAL HIGH FREQUENCY SENSORINEURAL HEARING LOSS: ICD-10-CM

## 2021-02-16 DIAGNOSIS — H61.23 EXCESSIVE CERUMEN IN BOTH EAR CANALS: ICD-10-CM

## 2021-02-16 DIAGNOSIS — R42 DIZZINESS: ICD-10-CM

## 2021-02-16 PROCEDURE — 99213 OFFICE O/P EST LOW 20 MIN: CPT | Mod: 25 | Performed by: OTOLARYNGOLOGY

## 2021-02-16 PROCEDURE — 92504 EAR MICROSCOPY EXAMINATION: CPT | Performed by: OTOLARYNGOLOGY

## 2021-02-16 ASSESSMENT — MIFFLIN-ST. JEOR: SCORE: 1273.25

## 2021-02-16 ASSESSMENT — PAIN SCALES - GENERAL: PAINLEVEL: NO PAIN (0)

## 2021-02-16 NOTE — PROGRESS NOTES
Dear Dr. Schoen, Gregory G:    I had the pleasure of meeting Stacie Hair in consultation today at the [facility] at your request.    CHIEF COMPLAINT: Dizziness patient is a 65-year-old in today for assessment of dizziness.    HISTORY OF PRESENT ILLNESS: Patient began having what she calls dizziness last March.  Initially she had a spinning sensation that was constant for several months.  That now seems better but she has continued symptoms that she describes as more just inability to concentrate.  Somewhat of a balance issue on that she may feel like she needs to touch the wall and she is going downstairs, etc.  She is able to function around the house.  Hips, back.  Seem to be more of a concentration issue.  She was initially thought to have benign positional vertigo and went to physical therapy with Epley maneuver.  That did seem to help with the positional dizziness.  More recent inability to concentrate and just does not seem to function as well hours are persistent.  This is present constantly.  Does not matter position, is present sitting, lying, or moving.  From an ear standpoint, she denies any hearing fluctuation, feels her hearing is equal and symmetrical.  She does have bilateral tinnitus she has had for about 6 years.  Is been no pain or drainage in your ears.  Denies any dysphagia, hoarseness, facial paresthesias.  She does have migraines as a child.  Has not had any recent headaches..  She is recently placed on Topamax but does not feel that been of any benefit.    ALLERGIES:    Allergies   Allergen Reactions     Codeine      Eggs [Eggs]      Milk Products      Nsaids      Intolerance       HABITS: Social History    Substance and Sexual Activity      Alcohol use: Yes        Alcohol/week: 0.0 standard drinks        Comment: occasional     History   Smoking Status     Former Smoker     Packs/day: 0.00     Years: 0.00   Smokeless Tobacco     Never Used     Comment:  1980-quit         PAST MEDICAL HISTORY: Please see today's intake form (for the remainder of the PMH) which I reviewed and signed.  Past Medical History:   Diagnosis Date     Benign positional vertigo 3/2020     Herniated discs      Hypertension 2017     Migraines 1972-     Myalgia and myositis, unspecified     Fibromyalgia     Tinnitus 2013       FAMILY HISTORY/SOCIAL HISTORY:   Family History   Problem Relation Age of Onset     Hypertension Mother      Heart Disease Father         MI      Social History     Socioeconomic History     Marital status:      Spouse name: Oscar     Number of children: 3     Years of education: 16     Highest education level: Not on file   Occupational History     Occupation: artist   Social Needs     Financial resource strain: Not on file     Food insecurity     Worry: Not on file     Inability: Not on file     Transportation needs     Medical: Not on file     Non-medical: Not on file   Tobacco Use     Smoking status: Former Smoker     Packs/day: 0.00     Years: 0.00     Pack years: 0.00     Smokeless tobacco: Never Used     Tobacco comment: 1980-quit   Substance and Sexual Activity     Alcohol use: Yes     Alcohol/week: 0.0 standard drinks     Comment: occasional     Drug use: No     Sexual activity: Yes     Partners: Male     Birth control/protection: Female Surgical   Lifestyle     Physical activity     Days per week: Not on file     Minutes per session: Not on file     Stress: Not on file   Relationships     Social connections     Talks on phone: Not on file     Gets together: Not on file     Attends Nondenominational service: Not on file     Active member of club or organization: Not on file     Attends meetings of clubs or organizations: Not on file     Relationship status: Not on file     Intimate partner violence     Fear of current or ex partner: Not on file     Emotionally abused: Not on file     Physically abused: Not on file     Forced sexual activity: Not on file   Other  Topics Concern     Parent/sibling w/ CABG, MI or angioplasty before 65F 55M? Not Asked   Social History Narrative     Not on file       REVIEW OF SYSTEMS: [unfilled]    The remainder of the 10 point ROS is negative    PHYSICIAL EXAMINATION:  Constitutional: The patient was well-groomed and in no acute distress.   Skin: Warm and pink.  Psychiatric: The patient's affect was calm, cooperative, and appropriate.   Respiratory: Breathing comfortably without stridor or exertion of accessory muscles.  Eyes: Pupils were equal and reactive. Extraocular movement intact.   Head: Normocephalic and atraumatic. No lesions or scars.  Ears: Patient placed under the microscope for microscopic evaluation and cleaning of cerumen which was obscuring full visualization and complete assessment of both TMs. Under high power magnification, the right ear was examined and cleaned of cerumen using curet, alligator forceps, and suction.  After cleaning, TM is fully visualized and has normal position with normal middle ear aeration. The left ear was then cleaned and inspected using microscope, instruments and similar techniques. After cleaning of cerumen, the TM has normal position with normal aeration to middle ear.  Nose: Sinuses were nontender. Anterior rhinoscopy revealed midline septum and absence of purulence or polyps.  Oral Cavity: Normal tongue, floor of mouth, buccal mucosa, and palate. No lesions or masses on inspection or palpation. No abnormal lymph tissue in the oropharynx.   Neck: The parotid is soft without masses. Supple with normal laryngeal and tracheal landmarks.   Lymphatic: There is no palpable lymphadenopathy or other masses in the neck.   Neurologic: Alert and oriented x 3. Cranial nerves III-XI within normal limits. Voice quality normal.  Cerebellar Function Tests:  Grossly normal    Audiogram: Audiogram performed shows very mild bilateral high-frequency sensorineural hearing loss above 6000 Hz.  It symmetrical.  100%  discrimination in each ear.  Normal type A tympanograms bilaterally.      IMPRESSION AND PLAN:   1. Dizziness: Discussed this with her in detail.  I do not think this sounds to be otologic cause with her description of symptoms and lack of any associated auditory symptoms.  Also essentially normal caloric findings on vestibular testing.  I suspect this may be related to migraine and then to have her see neurologist for assessment of possible vestibular migraines.  2. Bilateral high-frequency sensorineural hearing loss: Mild, no treatment needed, monitor.  3. Bilateral tinnitus: Secondary to sensorineural hearing loss, no treatment needed, monitor.  4. Excessive cerumen: Cleaned today, no further treatment needed, monitor.    Thank you very much for the opportunity to participate in the care of your patient.    Rick L Nissen MD

## 2021-02-16 NOTE — LETTER
2/16/2021       RE: Stacie Hair  [address]  [facility] [address]     Dear Colleague,    Thank you for referring your patient, Stacie Hair, to the [facility] [place] at [facility]. Please see a copy of my visit note below.    Dear Dr. Schoen, Gregory G:    I had the pleasure of meeting Stacie Hair in consultation today at the [facility] Otolaryngology Clinic at your request.    CHIEF COMPLAINT: Dizziness patient is a 65-year-old in today for assessment of dizziness.    HISTORY OF PRESENT ILLNESS: Patient began having what she calls dizziness last March.  Initially she had a spinning sensation that was constant for several months.  That now seems better but she has continued symptoms that she describes as more just inability to concentrate.  Somewhat of a balance issue on that she may feel like she needs to touch the wall and she is going downstairs, etc.  She is able to function around the house.  Hips, back.  Seem to be more of a concentration issue.  She was initially thought to have benign positional vertigo and went to physical therapy with Epley maneuver.  That did seem to help with the positional dizziness.  More recent inability to concentrate and just does not seem to function as well hours are persistent.  This is present constantly.  Does not matter position, is present sitting, lying, or moving.  From an ear standpoint, she denies any hearing fluctuation, feels her hearing is equal and symmetrical.  She does have bilateral tinnitus she has had for about 6 years.  Is been no pain or drainage in your ears.  Denies any dysphagia, hoarseness, facial paresthesias.  She does have migraines as a child.  Has not had any recent headaches..  She is recently placed on Topamax but does not feel that been of any benefit.    ALLERGIES:    Allergies   Allergen Reactions     Codeine      Eggs [Eggs]       Milk Products      Nsaids      Intolerance       HABITS: Social History    Substance and Sexual Activity      Alcohol use: Yes        Alcohol/week: 0.0 standard drinks        Comment: occasional     History   Smoking Status     Former Smoker     Packs/day: 0.00     Years: 0.00   Smokeless Tobacco     Never Used     Comment: 1980-quit         PAST MEDICAL HISTORY: Please see today's intake form (for the remainder of the PMH) which I reviewed and signed.  Past Medical History:   Diagnosis Date     Benign positional vertigo 3/2020     Herniated discs      Hypertension 2017     Migraines 1972-     Myalgia and myositis, unspecified     Fibromyalgia     Tinnitus 2013       FAMILY HISTORY/SOCIAL HISTORY:   Family History   Problem Relation Age of Onset     Hypertension Mother      Heart Disease Father         MI      Social History     Socioeconomic History     Marital status:      Spouse name: Oscar     Number of children: 3     Years of education: 16     Highest education level: Not on file   Occupational History     Occupation: artist   Social Needs     Financial resource strain: Not on file     Food insecurity     Worry: Not on file     Inability: Not on file     Transportation needs     Medical: Not on file     Non-medical: Not on file   Tobacco Use     Smoking status: Former Smoker     Packs/day: 0.00     Years: 0.00     Pack years: 0.00     Smokeless tobacco: Never Used     Tobacco comment: 1980-quit   Substance and Sexual Activity     Alcohol use: Yes     Alcohol/week: 0.0 standard drinks     Comment: occasional     Drug use: No     Sexual activity: Yes     Partners: Male     Birth control/protection: Female Surgical   Lifestyle     Physical activity     Days per week: Not on file     Minutes per session: Not on file     Stress: Not on file   Relationships     Social connections     Talks on phone: Not on file     Gets together: Not on file     Attends Pentecostal service: Not on file     Active member of  club or organization: Not on file     Attends meetings of clubs or organizations: Not on file     Relationship status: Not on file     Intimate partner violence     Fear of current or ex partner: Not on file     Emotionally abused: Not on file     Physically abused: Not on file     Forced sexual activity: Not on file   Other Topics Concern     Parent/sibling w/ CABG, MI or angioplasty before 65F 55M? Not Asked   Social History Narrative     Not on file       REVIEW OF SYSTEMS: [unfilled]    The remainder of the 10 point ROS is negative    PHYSICIAL EXAMINATION:  Constitutional: The patient was well-groomed and in no acute distress.   Skin: Warm and pink.  Psychiatric: The patient's affect was calm, cooperative, and appropriate.   Respiratory: Breathing comfortably without stridor or exertion of accessory muscles.  Eyes: Pupils were equal and reactive. Extraocular movement intact.   Head: Normocephalic and atraumatic. No lesions or scars.  Ears: Patient placed under the microscope for microscopic evaluation and cleaning of cerumen which was obscuring full visualization and complete assessment of both TMs. Under high power magnification, the right ear was examined and cleaned of cerumen using curet, alligator forceps, and suction.  After cleaning, TM is fully visualized and has normal position with normal middle ear aeration. The left ear was then cleaned and inspected using microscope, instruments and similar techniques. After cleaning of cerumen, the TM has normal position with normal aeration to middle ear.  Nose: Sinuses were nontender. Anterior rhinoscopy revealed midline septum and absence of purulence or polyps.  Oral Cavity: Normal tongue, floor of mouth, buccal mucosa, and palate. No lesions or masses on inspection or palpation. No abnormal lymph tissue in the oropharynx.   Neck: The parotid is soft without masses. Supple with normal laryngeal and tracheal landmarks.   Lymphatic: There is no palpable  lymphadenopathy or other masses in the neck.   Neurologic: Alert and oriented x 3. Cranial nerves III-XI within normal limits. Voice quality normal.  Cerebellar Function Tests:  Grossly normal    Audiogram: Audiogram performed shows very mild bilateral high-frequency sensorineural hearing loss above 6000 Hz.  It symmetrical.  100% discrimination in each ear.  Normal type A tympanograms bilaterally.      IMPRESSION AND PLAN:   1. Dizziness: Discussed this with her in detail.  I do not think this sounds to be otologic cause with her description of symptoms and lack of any associated auditory symptoms.  Also essentially normal caloric findings on vestibular testing.  I suspect this may be related to migraine and then to have her see neurologist for assessment of possible vestibular migraines.  2. Bilateral high-frequency sensorineural hearing loss: Mild, no treatment needed, monitor.  3. Bilateral tinnitus: Secondary to sensorineural hearing loss, no treatment needed, monitor.  4. Excessive cerumen: Cleaned today, no further treatment needed, monitor.    Thank you very much for the opportunity to participate in the care of your patient.    Rick L Nissen MD

## 2021-02-16 NOTE — NURSING NOTE
"Chief Complaint   Patient presents with     Consult     vertigo      Blood pressure 124/80, pulse 89, temperature 97.7  F (36.5  C), resp. rate 14, height 1.575 m (5' 2\"), weight 77 kg (169 lb 12.1 oz), last menstrual period 05/13/2004, SpO2 97 %.    Alexei Darnell LPN    "

## 2021-02-22 ENCOUNTER — HOSPITAL ENCOUNTER (OUTPATIENT)
Dept: PHYSICAL THERAPY | Facility: CLINIC | Age: 66
Setting detail: THERAPIES SERIES
End: 2021-02-22
Attending: OTOLARYNGOLOGY
Payer: COMMERCIAL

## 2021-02-22 ENCOUNTER — VIRTUAL VISIT (OUTPATIENT)
Dept: OTOLARYNGOLOGY | Facility: CLINIC | Age: 66
End: 2021-02-22
Payer: COMMERCIAL

## 2021-02-22 DIAGNOSIS — G43.809 VESTIBULAR MIGRAINE: Primary | ICD-10-CM

## 2021-02-22 PROCEDURE — 97110 THERAPEUTIC EXERCISES: CPT | Mod: GP | Performed by: PHYSICAL THERAPIST

## 2021-02-22 PROCEDURE — 97112 NEUROMUSCULAR REEDUCATION: CPT | Mod: GP | Performed by: PHYSICAL THERAPIST

## 2021-02-22 PROCEDURE — 99213 OFFICE O/P EST LOW 20 MIN: CPT | Mod: 95 | Performed by: OTOLARYNGOLOGY

## 2021-02-22 RX ORDER — NORTRIPTYLINE HCL 10 MG
10 CAPSULE ORAL AT BEDTIME
Qty: 30 CAPSULE | Refills: 0 | Status: SHIPPED | OUTPATIENT
Start: 2021-02-22 | End: 2021-03-24

## 2021-02-22 NOTE — PROGRESS NOTES
"Stacie is a 65 year old who is being evaluated via a billable telephone visit.      What phone number would you like to be contacted at? [phone]  How would you like to obtain your AVS? Gianni      Phone call duration: 15 minutes    History of Present Illness - Stacie Hair is a 65 year old female presenting in clinic today for a recheck on Patient presents with:  Follow Up: migraine, topiramate    Patient diagnosed with vestibular migraine has been started on topiramate as well as control of her sugars.  She was seen for second opinion by Dr. Nissen at the the [facility] who agreed with my conclusion.  He reinforced proper approaches to vestibular migraine.  Patient is trying to control her triggers.  However she had problems with topiramate.  Is not uncommon for patients to feel \"foggy\" on the drug.  She discontinued.  Symptoms overall slightly better controlled.  She is also scheduled to see neurology at the [facility] on March 1.      There is no height or weight on file to calculate BMI.        BP Readings from Last 1 Encounters:   02/16/21 124/80     Patient does take antihypertensive drugs.  BP noted to be well controlled today in office.     Stacie IS NOT a smoker/uses chewing tobacco.        Past Medical History -   Past Medical History:   Diagnosis Date     Benign positional vertigo 3/2020     Herniated discs      Hypertension 2017     Migraines 1972-     Myalgia and myositis, unspecified     Fibromyalgia     Tinnitus 2013       Current Medications -   Current Outpatient Medications:      Calcium Carb-Cholecalciferol (CALCIUM + D3 PO), , Disp: , Rfl:      Cyanocobalamin (VITAMIN B-12 PO), , Disp: , Rfl:      hydrochlorothiazide (HYDRODIURIL) 25 MG tablet, TAKE ONE TABLET BY MOUTH ONCE DAILY, Disp: 30 tablet, Rfl: 1     Magnesium 80 MG TABS, , Disp: , Rfl:      NEW MED, VitaPulse, Disp: , Rfl:      Omega-3 Fatty Acids (OMEGA 3 PO), Take by mouth daily, Disp: , Rfl:      " topiramate (TOPAMAX) 25 MG tablet, Take 1 tablet (25 mg) by mouth daily, Disp: 30 tablet, Rfl: 0    Allergies -   Allergies   Allergen Reactions     Codeine      Eggs [Eggs]      Milk Products      Nsaids      Intolerance       Social History -   Social History     Socioeconomic History     Marital status:      Spouse name: Oscar     Number of children: 3     Years of education: 16     Highest education level: Not on file   Occupational History     Occupation: artist   Social Needs     Financial resource strain: Not on file     Food insecurity     Worry: Not on file     Inability: Not on file     Transportation needs     Medical: Not on file     Non-medical: Not on file   Tobacco Use     Smoking status: Former Smoker     Packs/day: 0.00     Years: 0.00     Pack years: 0.00     Smokeless tobacco: Never Used     Tobacco comment: 1980-quit   Substance and Sexual Activity     Alcohol use: Yes     Alcohol/week: 0.0 standard drinks     Comment: occasional     Drug use: No     Sexual activity: Yes     Partners: Male     Birth control/protection: Female Surgical   Lifestyle     Physical activity     Days per week: Not on file     Minutes per session: Not on file     Stress: Not on file   Relationships     Social connections     Talks on phone: Not on file     Gets together: Not on file     Attends [employer] service: Not on file     Active member of club or organization: Not on file     Attends meetings of clubs or organizations: Not on file     Relationship status: Not on file     Intimate partner violence     Fear of current or ex partner: Not on file     Emotionally abused: Not on file     Physically abused: Not on file     Forced sexual activity: Not on file   Other Topics Concern     Parent/sibling w/ CABG, MI or angioplasty before 65F 55M? Not Asked   Social History Narrative     Not on file       Family History -   Family History   Problem Relation Age of Onset     Hypertension Mother      Heart Disease Father          MI       Review of Systems - As per HPI and PMHx, otherwise review of system review of the head and neck negative. Otherwise 10+ review of system is negative      Perform      A/P - Stacie EM Laxmi is a 65 year old female Patient presents with:  Follow Up: migraine, topiramate    We again counseled the patient on reduction of proper migraine triggers in her environment.  Also advised perhaps magnesium supplementation which was found to be helpful take it a couple of times a week.  With switching her to nortriptyline starting at low-dose 10 mg and after a week if she is tolerating it and feels that she may require better control of her symptomatology will go up to 20 mg at bedtime.  I will have another virtual visit with the patient in 2 weeks to discuss the results of her visit with neurology as well as look at the the treatment efficacy with nortriptyline.    Stacie should follow up virtually in 2 weeks.      Fuentes Mina MD

## 2021-02-22 NOTE — LETTER
"    2/22/2021         RE: Stacie Hair  [address]  [facility] [address]        Dear Colleague,    Thank you for referring your patient, Stacie Hair, to the [facility]. Please see a copy of my visit note below.    Stacie is a 65 year old who is being evaluated via a billable telephone visit.      What phone number would you like to be contacted at? [phone]  How would you like to obtain your AVS? MyChart      Phone call duration: 15 minutes    History of Present Illness - Stacie Hair is a 65 year old female presenting in clinic today for a recheck on Patient presents with:  Follow Up: migraine, topiramate    Patient diagnosed with vestibular migraine has been started on topiramate as well as control of her sugars.  She was seen for second opinion by Dr. Nissen at the the [facility] who agreed with my conclusion.  He reinforced proper approaches to vestibular migraine.  Patient is trying to control her triggers.  However she had problems with topiramate.  Is not uncommon for patients to feel \"foggy\" on the drug.  She discontinued.  Symptoms overall slightly better controlled.  She is also scheduled to see neurology at the [facility] on March 1.      There is no height or weight on file to calculate BMI.        BP Readings from Last 1 Encounters:   02/16/21 124/80     Patient does take antihypertensive drugs.  BP noted to be well controlled today in office.     Stacie IS NOT a smoker/uses chewing tobacco.        Past Medical History -   Past Medical History:   Diagnosis Date     Benign positional vertigo 3/2020     Herniated discs      Hypertension 2017     Migraines 1972-     Myalgia and myositis, unspecified     Fibromyalgia     Tinnitus 2013       Current Medications -   Current Outpatient Medications:      Calcium Carb-Cholecalciferol (CALCIUM + D3 PO), , Disp: , Rfl:      Cyanocobalamin (VITAMIN B-12 PO), , Disp: , Rfl:      " hydrochlorothiazide (HYDRODIURIL) 25 MG tablet, TAKE ONE TABLET BY MOUTH ONCE DAILY, Disp: 30 tablet, Rfl: 1     Magnesium 80 MG TABS, , Disp: , Rfl:      NEW MED, VitaPulse, Disp: , Rfl:      Omega-3 Fatty Acids (OMEGA 3 PO), Take by mouth daily, Disp: , Rfl:      topiramate (TOPAMAX) 25 MG tablet, Take 1 tablet (25 mg) by mouth daily, Disp: 30 tablet, Rfl: 0    Allergies -   Allergies   Allergen Reactions     Codeine      Eggs [Eggs]      Milk Products      Nsaids      Intolerance       Social History -   Social History     Socioeconomic History     Marital status:      Spouse name: Oscar     Number of children: 3     Years of education: 16     Highest education level: Not on file   Occupational History     Occupation: artist   Social Needs     Financial resource strain: Not on file     Food insecurity     Worry: Not on file     Inability: Not on file     Transportation needs     Medical: Not on file     Non-medical: Not on file   Tobacco Use     Smoking status: Former Smoker     Packs/day: 0.00     Years: 0.00     Pack years: 0.00     Smokeless tobacco: Never Used     Tobacco comment: 1980-quit   Substance and Sexual Activity     Alcohol use: Yes     Alcohol/week: 0.0 standard drinks     Comment: occasional     Drug use: No     Sexual activity: Yes     Partners: Male     Birth control/protection: Female Surgical   Lifestyle     Physical activity     Days per week: Not on file     Minutes per session: Not on file     Stress: Not on file   Relationships     Social connections     Talks on phone: Not on file     Gets together: Not on file     Attends Jehovah's witness service: Not on file     Active member of club or organization: Not on file     Attends meetings of clubs or organizations: Not on file     Relationship status: Not on file     Intimate partner violence     Fear of current or ex partner: Not on file     Emotionally abused: Not on file     Physically abused: Not on file     Forced sexual activity: Not on  file   Other Topics Concern     Parent/sibling w/ CABG, MI or angioplasty before 65F 55M? Not Asked   Social History Narrative     Not on file       Family History -   Family History   Problem Relation Age of Onset     Hypertension Mother      Heart Disease Father         MI       Review of Systems - As per HPI and PMHx, otherwise review of system review of the head and neck negative. Otherwise 10+ review of system is negative      Perform      A/P - Stacie Hair is a 65 year old female Patient presents with:  Follow Up: migraine, topiramate    We again counseled the patient on reduction of proper migraine triggers in her environment.  Also advised perhaps magnesium supplementation which was found to be helpful take it a couple of times a week.  With switching her to nortriptyline starting at low-dose 10 mg and after a week if she is tolerating it and feels that she may require better control of her symptomatology will go up to 20 mg at bedtime.  I will have another virtual visit with the patient in 2 weeks to discuss the results of her visit with neurology as well as look at the the treatment efficacy with nortriptyline.    Stacie should follow up virtually in 2 weeks.      Fuentes Mina MD            Again, thank you for allowing me to participate in the care of your patient.        Sincerely,        Fuentes Mina MD, MD

## 2021-02-24 ENCOUNTER — PRE VISIT (OUTPATIENT)
Dept: NEUROLOGY | Facility: CLINIC | Age: 66
End: 2021-02-24

## 2021-02-24 NOTE — TELEPHONE ENCOUNTER
FUTURE VISIT INFORMATION      FUTURE VISIT INFORMATION:    Date: 3/1/2021    Time: 840am    Location: [facility]  REFERRAL INFORMATION:    Referring provider:  Dr. Nissen    Referring providers clinic:  [facility]     Reason for visit/diagnosis  Migraines     RECORDS REQUESTED FROM:       Clinic name Comments Records Status Imaging Status   Internal Dr. Nissen-2/16/2021    Dr. Mina-2/8/2021    CT Temporal-9/23/2020    MR Brain-7/17/2020    CT Head-8/27/2018 Epic PACS

## 2021-03-01 ENCOUNTER — OFFICE VISIT (OUTPATIENT)
Dept: NEUROLOGY | Facility: CLINIC | Age: 66
End: 2021-03-01
Attending: OTOLARYNGOLOGY
Payer: COMMERCIAL

## 2021-03-01 VITALS
RESPIRATION RATE: 16 BRPM | OXYGEN SATURATION: 96 % | SYSTOLIC BLOOD PRESSURE: 152 MMHG | DIASTOLIC BLOOD PRESSURE: 95 MMHG | BODY MASS INDEX: 31.18 KG/M2 | HEART RATE: 102 BPM | WEIGHT: 170.5 LBS

## 2021-03-01 DIAGNOSIS — G89.29 CHRONIC NECK PAIN: ICD-10-CM

## 2021-03-01 DIAGNOSIS — M54.2 CHRONIC NECK PAIN: ICD-10-CM

## 2021-03-01 DIAGNOSIS — I10 HYPERTENSION, UNSPECIFIED TYPE: ICD-10-CM

## 2021-03-01 DIAGNOSIS — R51.9 CHRONIC DAILY HEADACHE: Primary | ICD-10-CM

## 2021-03-01 DIAGNOSIS — G44.221 CHRONIC TENSION-TYPE HEADACHE, INTRACTABLE: ICD-10-CM

## 2021-03-01 DIAGNOSIS — Z87.898 HISTORY OF DIZZINESS: ICD-10-CM

## 2021-03-01 DIAGNOSIS — G43.109 MIGRAINE WITH AURA AND WITHOUT STATUS MIGRAINOSUS, NOT INTRACTABLE: ICD-10-CM

## 2021-03-01 DIAGNOSIS — H81.10 BENIGN PAROXYSMAL POSITIONAL VERTIGO, UNSPECIFIED LATERALITY: ICD-10-CM

## 2021-03-01 PROCEDURE — 99205 OFFICE O/P NEW HI 60 MIN: CPT | Performed by: NURSE PRACTITIONER

## 2021-03-01 RX ORDER — OXAZEPAM 10 MG
CAPSULE ORAL
COMMUNITY
Start: 2021-02-18 | End: 2021-05-10

## 2021-03-01 ASSESSMENT — PAIN SCALES - GENERAL: PAINLEVEL: NO PAIN (0)
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
## 2021-03-01 NOTE — PATIENT INSTRUCTIONS
Plan:  Stay hydrated  Stay active  Limit caffeine use  Continue nortriptyline and monitor for symptoms improvement. Alternatively -might benefit from a trial of propranolol for headaches, hypertension, and might help with dizziness  Vitamin B2 200-400 mg daily for migraine headache prevention  Magnesium 250 mg at bedtime for migraine prevention  Follow up in 4-6 weeks virtually or sooner if needed       TMJ problems -see your dentist and may be benefiting TMJ treatment -might be contributing to the myofascial/tension and nortriptyline.     Hypertension -check blood pressure at home once per day but same time every day. Follow up with PCP if remains consistently elevated over 120-130/80.   Rash-monitor and follow up with PCP if getting worse.

## 2021-03-01 NOTE — NURSING NOTE
Chief Complaint   Patient presents with     Consult     [facility]- Vestibular migraine      Ishan Faust
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
## 2021-03-08 ENCOUNTER — HOSPITAL ENCOUNTER (OUTPATIENT)
Dept: PHYSICAL THERAPY | Facility: CLINIC | Age: 66
Setting detail: THERAPIES SERIES
End: 2021-03-08
Attending: OTOLARYNGOLOGY
Payer: COMMERCIAL

## 2021-03-08 PROCEDURE — 97140 MANUAL THERAPY 1/> REGIONS: CPT | Mod: GP | Performed by: PHYSICAL THERAPIST

## 2021-03-08 PROCEDURE — 97110 THERAPEUTIC EXERCISES: CPT | Mod: GP | Performed by: PHYSICAL THERAPIST

## 2021-03-08 PROCEDURE — 97112 NEUROMUSCULAR REEDUCATION: CPT | Mod: GP | Performed by: PHYSICAL THERAPIST

## 2021-03-13 ENCOUNTER — HEALTH MAINTENANCE LETTER (OUTPATIENT)
Age: 66
End: 2021-03-13

## 2021-03-23 DIAGNOSIS — Z13.6 CARDIOVASCULAR SCREENING; LDL GOAL LESS THAN 130: ICD-10-CM

## 2021-03-23 DIAGNOSIS — I10 BENIGN ESSENTIAL HYPERTENSION: ICD-10-CM

## 2021-03-23 DIAGNOSIS — I10 BENIGN ESSENTIAL HYPERTENSION: Primary | ICD-10-CM

## 2021-03-23 RX ORDER — HYDROCHLOROTHIAZIDE 25 MG/1
TABLET ORAL
Qty: 30 TABLET | Refills: 1 | Status: SHIPPED | OUTPATIENT
Start: 2021-03-23 | End: 2021-04-21

## 2021-03-23 NOTE — TELEPHONE ENCOUNTER
Routing refill request to provider for review/approval because:  Labs not current:  Creatinine, potassium, sodium  Blood pressures elevated above goal.     Alea Allison RN

## 2021-03-24 DIAGNOSIS — G43.809 VESTIBULAR MIGRAINE: ICD-10-CM

## 2021-03-24 RX ORDER — NORTRIPTYLINE HCL 10 MG
10 CAPSULE ORAL AT BEDTIME
Qty: 30 CAPSULE | Refills: 0 | Status: SHIPPED | OUTPATIENT
Start: 2021-03-24 | End: 2021-04-05

## 2021-03-24 NOTE — TELEPHONE ENCOUNTER
"Pamelor  Last Written Prescription Date:  2/22/221  Last Fill Quantity: 30,  # refills: 0   Last office visit: 2/22/2021 with prescribing provider:     Future Office Visit:   Next 5 appointments (look out 90 days)    Apr 20, 2021  4:30 PM  Office Visit with Gregory G Schoen, MD  [facility] ([facility] ) [address] [place] [id number] [phone]           Requested Prescriptions   Pending Prescriptions Disp Refills     nortriptyline (PAMELOR) 10 MG capsule 30 capsule 0     Sig: Take 1 capsule (10 mg) by mouth At Bedtime       Tricyclic Agents ( Annual appt and no PHQ9) Failed - 3/24/2021 11:20 AM        Failed - Blood Pressure under 140/90 in past 12 mos     BP Readings from Last 3 Encounters:   03/01/21 (!) 152/95   02/16/21 124/80   02/08/21 130/64                 Passed - Recent (12 mo) or future (30 days) visit within authorizing provider's specialty     Patient has had an office visit with the authorizing provider or a provider within the authorizing providers department within the previous 12 mos or has a future within next 30 days. See \"Patient Info\" tab in inbasket, or \"Choose Columns\" in Meds & Orders section of the refill encounter.              Passed - Medication is active on med list        Passed - Patient is age 18 or older        Passed - Patient is not pregnant        Passed - No positive pregnancy test on record in past 12 mos                 Kayleen Novoa RN on 3/24/2021 at 11:34 AM    "

## 2021-03-24 NOTE — TELEPHONE ENCOUNTER
nortriptyline (PAMELOR) 10 MG capsule  Last Written Prescription Date:  02/22/21  Last Fill Quantity: 30 cap,  # refills: 0   Last office visit: 2/22/2021 with prescribing provider:     Future Office Visit:   Next 5 appointments (look out 90 days)    Apr 20, 2021  4:30 PM  Office Visit with Gregory G Schoen, MD  [facility] ([facility] ) [address] [place] [id number] [phone]           There are no medications in this encounter.           Stevie Parker MA on 3/24/2021 at 11:20 AM

## 2021-03-25 DIAGNOSIS — I10 BENIGN ESSENTIAL HYPERTENSION: ICD-10-CM

## 2021-03-25 DIAGNOSIS — Z13.6 CARDIOVASCULAR SCREENING; LDL GOAL LESS THAN 130: ICD-10-CM

## 2021-03-25 LAB
ANION GAP SERPL CALCULATED.3IONS-SCNC: 6 MMOL/L (ref 3–14)
BUN SERPL-MCNC: 14 MG/DL (ref 7–30)
CALCIUM SERPL-MCNC: 9.3 MG/DL (ref 8.5–10.1)
CHLORIDE SERPL-SCNC: 107 MMOL/L (ref 94–109)
CHOLEST SERPL-MCNC: 219 MG/DL
CO2 SERPL-SCNC: 27 MMOL/L (ref 20–32)
CREAT SERPL-MCNC: 0.67 MG/DL (ref 0.52–1.04)
CREAT UR-MCNC: 137 MG/DL
GFR SERPL CREATININE-BSD FRML MDRD: >90 ML/MIN/{1.73_M2}
GLUCOSE SERPL-MCNC: 116 MG/DL (ref 70–99)
HDLC SERPL-MCNC: 42 MG/DL
LDLC SERPL CALC-MCNC: 140 MG/DL
MICROALBUMIN UR-MCNC: 30 MG/L
MICROALBUMIN/CREAT UR: 21.9 MG/G CR (ref 0–25)
NONHDLC SERPL-MCNC: 177 MG/DL
POTASSIUM SERPL-SCNC: 3.6 MMOL/L (ref 3.4–5.3)
SODIUM SERPL-SCNC: 140 MMOL/L (ref 133–144)
TRIGL SERPL-MCNC: 186 MG/DL

## 2021-03-25 PROCEDURE — 36415 COLL VENOUS BLD VENIPUNCTURE: CPT | Performed by: FAMILY MEDICINE

## 2021-03-25 PROCEDURE — 80048 BASIC METABOLIC PNL TOTAL CA: CPT | Performed by: FAMILY MEDICINE

## 2021-03-25 PROCEDURE — 82043 UR ALBUMIN QUANTITATIVE: CPT | Performed by: FAMILY MEDICINE

## 2021-03-25 PROCEDURE — 80061 LIPID PANEL: CPT | Performed by: FAMILY MEDICINE

## 2021-03-30 ENCOUNTER — HOSPITAL ENCOUNTER (OUTPATIENT)
Dept: PHYSICAL THERAPY | Facility: CLINIC | Age: 66
Setting detail: THERAPIES SERIES
End: 2021-03-30
Attending: OTOLARYNGOLOGY
Payer: COMMERCIAL

## 2021-03-30 PROCEDURE — 97112 NEUROMUSCULAR REEDUCATION: CPT | Mod: GP | Performed by: PHYSICAL THERAPIST

## 2021-03-30 PROCEDURE — 97110 THERAPEUTIC EXERCISES: CPT | Mod: GP | Performed by: PHYSICAL THERAPIST

## 2021-04-05 ENCOUNTER — VIRTUAL VISIT (OUTPATIENT)
Dept: NEUROLOGY | Facility: CLINIC | Age: 66
End: 2021-04-05
Payer: COMMERCIAL

## 2021-04-05 DIAGNOSIS — R51.9 CHRONIC DAILY HEADACHE: Primary | ICD-10-CM

## 2021-04-05 DIAGNOSIS — Z87.898 HISTORY OF DIZZINESS: ICD-10-CM

## 2021-04-05 DIAGNOSIS — G43.109 MIGRAINE WITH AURA AND WITHOUT STATUS MIGRAINOSUS, NOT INTRACTABLE: ICD-10-CM

## 2021-04-05 PROCEDURE — 99214 OFFICE O/P EST MOD 30 MIN: CPT | Mod: 95 | Performed by: NURSE PRACTITIONER

## 2021-04-05 RX ORDER — VENLAFAXINE HYDROCHLORIDE 37.5 MG/1
37.5 CAPSULE, EXTENDED RELEASE ORAL DAILY
Qty: 30 CAPSULE | Refills: 3 | Status: SHIPPED | OUTPATIENT
Start: 2021-04-05 | End: 2021-04-21

## 2021-04-05 NOTE — LETTER
4/5/2021       RE: Stacie Hair  [address]  [facility] [address]     Dear Colleague,    Thank you for referring your patient, Stacie Hair, to the [facility] [place] at [facility]. Please see a copy of my visit note below.    Stacie is a 65 year old who is being evaluated via a billable video visit.      How would you like to obtain your AVS? MyChart  If the video visit is dropped, the invitation should be resent by: Send to e-mail at: [email or web address]  Will anyone else be joining your video visit? No      Video Start Time: 2:01 PM      Subjective   Stacie is a 65 year old who presents for the following health issues -headache and dizziness follow up.   Initial Headache Clinic visit on 3/1/2021, see note for details. Recommended to continue nortriptyline for headaches and dizziness.   Today reports that she is Still feeling dizzy but  varies in severity however, brain fog and lack of concentration are better -able to think clearly.   Patient reports that she able to walk around the house but dizzy when in the strange places and more dizzy with walking not on the hard surfes.   Not so much of the headaches but occasional.   No headaches since having a vertigo. Did get headaches in the spring and fall with barometric pressure changes.   Patient reports that she has concerns with nortriptyline of memory problems. Sister was taking amitriptyline for anxiety.   Patient does not feel any difference.   Patient tried topiramate -was causing headache.   Blood pressure 104-128/77 at home averaging   Staying hydrated  Losing weight -has been counting the calories and keeps track of blood pressure  Takes oxazepam for dizziness and has been taking it for about 5 month and has been helping.     Treatment Plan discussed:  Stop nortriptyline   Wean oxazepam as needed  A trial of venlafaxine 37.5 mg daily. Side effects-dizziness, insomnia  or somnolence, mood changes, worsening depression, changes in appetite, blood pressure changes.   Vitamin D  Follow up in 4-6 weeks or sooner if needed       Review of Systems   CONSTITUTIONAL: NEGATIVE for fever, chills, change in weight  ENT/MOUTH: NEGATIVE for ear, mouth and throat problems  RESP: NEGATIVE for significant cough or SOB  CV: NEGATIVE for chest pain, palpitations or peripheral edema  NEURO: NEGATIVE for weakness, POSITIVE for dizziness/lightheadedness and Hx headaches-migraine  PSYCHIATRIC: anxiety       Objective       Vitals:  No vitals were obtained today due to virtual visit.    Physical Exam   GENERAL: Healthy, alert and no distress  EYES: Eyes grossly normal to inspection.  No discharge or erythema, or obvious scleral/conjunctival abnormalities.  RESP: No audible wheeze, cough, or visible cyanosis.  No visible retractions or increased work of breathing.    SKIN: Visible skin clear.   NEURO: face is symmetrical and symmetrical facial expressions  PSYCH: Mentation appears normal, affect normal, judgement and insight intact, normal speech and appearance well-groomed.    Video-Visit Details    Type of service:  Video Visit    Video End Time:2:28 PM    Originating Location (pt. Location): Home    Distant Location (provider location):  [facility]     Platform used for Video Visit: Contreras       I discussed all my recommendations with Stacie Hair who verbalizes understanding and comfortable with the plan.  All of patient's questions were answered from the best of my knowledge.  Patient is in agreement with the plan.     29 minutes spent on the date of the encounter doing chart review, history and exam, documentation and further activities as noted above    RUPA Henry, CNP [facility]  Headache certified  [facility]

## 2021-04-05 NOTE — PATIENT INSTRUCTIONS
Plan:  Stop nortriptyline   Wean oxazepam as needed  A trial of venlafaxine 37.5 mg daily. Side effects-dizziness, insomnia or somnolence, mood changes, worsening depression, changes in appetite, blood pressure changes.   Follow up in 4-6 weeks virtual or sooner if needed         Patient Education     Venlafaxine XR Oral Capsule 37.5 mg  Uses  This medicine is used for the following purposes:    anxiety    depression    menopausal symptoms    narcolepsy    post-traumatic stress disorder  Instructions  Swallow the medicine without crushing or chewing it.  Take the medicine with food.  It is very important that you take the medicine at about the same time every day. It will work best if you do this.  Keep the medicine at room temperature. Avoid heat and direct light.  It may take several weeks for this medicine to fully work.  It is important that you keep taking each dose of this medicine on time even if you are feeling well.  If you forget to take a dose on time, take it as soon as you remember. If it is almost time for the next dose, do not take the missed dose. Return to your normal dosing schedule. Do not take 2 doses of this medicine at one time.  Please tell your doctor and pharmacist about all the medicines you take. Include both prescription and over-the-counter medicines. Also tell them about any vitamins, herbal medicines, or anything else you take for your health.  Do not suddenly stop taking this medicine. Check with your doctor before stopping.  Cautions  Tell your doctor and pharmacist if you ever had an allergic reaction to a medicine. Symptoms of an allergic reaction can include trouble breathing, skin rash, itching, swelling, or severe dizziness.  Do not use the medication any more than instructed.  Your ability to stay alert or to react quickly may be impaired by this medicine. Do not drive or operate machinery until you know how this medicine will affect you.  Do not drink beverages with alcohol  while on this medicine.  Family should check on the patient often. Call the doctor if patient becomes more depressed, has thoughts of suicide, or shows changes in behavior.  Call the doctor if there are any signs of confusion or unusual changes in behavior.  Tell the doctor or pharmacist if you are pregnant, planning to be pregnant, or breastfeeding.  Ask your pharmacist if this medicine can interact with any of your other medicines. Be sure to tell them about all the medicines you take.  Do not start or stop any other medicines without first speaking to your doctor or pharmacist.  Call your doctor right away if you notice any unusual bleeding or bruising.  Do not share this medicine with anyone who has not been prescribed this medicine.  This medicine can cause serious side effects in some patients. Important information from the U[place]. Food and Drug Administration (FDA) is available from your pharmacist. Please review it carefully with your pharmacist to understand the risks associated with this medicine.  Side Effects  The following is a list of some common side effects from this medicine. Please speak with your doctor about what you should do if you experience these or other side effects.    agitated feeling or trouble sleeping    decreased appetite    constipation    dizziness    drowsiness or sedation    dry mouth    high blood pressure    nausea    nervousness    sweating  Call your doctor or get medical help right away if you notice any of these more serious side effects:    bleeding that is severe or takes longer to stop    unusual bruising or discoloration on skin    chest pain    cough that does not go away    pain in the eye    hallucinations (unusual thoughts, seeing or hearing things that are not real)    fast or irregular heart beats    muscle cramps    muscle weakness    dilation of the pupils    restlessness    problems with sexual functions or desire    shakiness    shortness of breath    dark, tarry  stool    blurring or changes of vision    severe or persistent vomiting  A few people may have an allergic reactions to this medicine. Symptoms can include difficulty breathing, skin rash, itching, swelling, or severe dizziness. If you notice any of these symptoms, seek medical help quickly.  Extra  Please speak with your doctor, nurse, or pharmacist if you have any questions about this medicine.  [email or web address]  IMPORTANT NOTE: This document tells you briefly how to take your medicine, but it does not tell you all there is to know about it.Your doctor or pharmacist may give you other documents about your medicine. Please talk to them if you have any questions.Always follow their advice. There is a more complete description of this medicine available in English.Scan this code on your smartphone or tablet or use the web address below. You can also ask your pharmacist for a printout. If you have any questions, please ask your pharmacist.     2021 v2 Ratings.

## 2021-04-05 NOTE — PROGRESS NOTES
Stacie is a 65 year old who is being evaluated via a billable video visit.      How would you like to obtain your AVS? MyChart  If the video visit is dropped, the invitation should be resent by: Send to e-mail at: [email or web address]  Will anyone else be joining your video visit? No      Video Start Time: 2:01 PM      Subjective   Stacie is a 65 year old who presents for the following health issues -headache and dizziness follow up.   Initial Headache Clinic visit on 3/1/2021, see note for details. Recommended to continue nortriptyline for headaches and dizziness.   Today reports that she is Still feeling dizzy but  varies in severity however, brain fog and lack of concentration are better -able to think clearly.   Patient reports that she able to walk around the house but dizzy when in the strange places and more dizzy with walking not on the hard surfes.   Not so much of the headaches but occasional.   No headaches since having a vertigo. Did get headaches in the spring and fall with barometric pressure changes.   Patient reports that she has concerns with nortriptyline of memory problems. Sister was taking amitriptyline for anxiety.   Patient does not feel any difference.   Patient tried topiramate -was causing headache.   Blood pressure 104-128/77 at home averaging   Staying hydrated  Losing weight -has been counting the calories and keeps track of blood pressure  Takes oxazepam for dizziness and has been taking it for about 5 month and has been helping.     Treatment Plan discussed:  Stop nortriptyline   Wean oxazepam as needed  A trial of venlafaxine 37.5 mg daily. Side effects-dizziness, insomnia or somnolence, mood changes, worsening depression, changes in appetite, blood pressure changes.   Vitamin D  Follow up in 4-6 weeks or sooner if needed       Review of Systems   CONSTITUTIONAL: NEGATIVE for fever, chills, change in weight  ENT/MOUTH: NEGATIVE for ear, mouth and throat problems  RESP: NEGATIVE for  significant cough or SOB  CV: NEGATIVE for chest pain, palpitations or peripheral edema  NEURO: NEGATIVE for weakness, POSITIVE for dizziness/lightheadedness and Hx headaches-migraine  PSYCHIATRIC: anxiety       Objective       Vitals:  No vitals were obtained today due to virtual visit.    Physical Exam   GENERAL: Healthy, alert and no distress  EYES: Eyes grossly normal to inspection.  No discharge or erythema, or obvious scleral/conjunctival abnormalities.  RESP: No audible wheeze, cough, or visible cyanosis.  No visible retractions or increased work of breathing.    SKIN: Visible skin clear.   NEURO: face is symmetrical and symmetrical facial expressions  PSYCH: Mentation appears normal, affect normal, judgement and insight intact, normal speech and appearance well-groomed.    Video-Visit Details    Type of service:  Video Visit    Video End Time:2:28 PM    Originating Location (pt. Location): Home    Distant Location (provider location):  [facility] [place]     Platform used for Video Visit: Contreras       I discussed all my recommendations with Stacie Hair who verbalizes understanding and comfortable with the plan.  All of patient's questions were answered from the best of my knowledge.  Patient is in agreement with the plan.     29 minutes spent on the date of the encounter doing chart review, history and exam, documentation and further activities as noted above    RUPA Henry, CNP [facility]  Headache certified  [facility]

## 2021-04-07 NOTE — PROGRESS NOTES
"Outpatient Physical Therapy Progress Note and Discharge Note     Patient: Stacie Hair  : 1955    Beginning/End Dates of Reporting Period:  21 to 2021(pt seen for 5 more PT visits since 21 progress report, 15 overall from 10/6/20 to 3/30/21)    Referring Provider: Fuentes Mina MD    Therapy Diagnosis: dizziness, mechanical neck pain with R>L upper extremity and headache referral, cervicogenic dizziness     Client Self Report: More clear headed since she received prayer from family/friends. Doesn't feel like brain damage. Still gets dizziness. Has some days where she almost feels normal and other days more dizzy. Daughter is making an appliance for her for her jaw. The last week has been more dizzy but has been more active and doing more walks. If walks on unsteady ground then can get more dizzy. Pt ready to try things on her own when reporting at last visit on 3/30/21 but keep PT chart open 1 month or so.    Objective Measurements:  Objective Measure: average pain level (at eval on 10/6/20 neck 3/10, head 5/10, UE 4/10)  Details: most of the day neck 0/10, no longer having UE sx. Average dizzy sx 2/10  Objective Measure: frequency (at eval head sx constant, neck constant, UE sx constant)  Details: neck pain 20% day, does not get UE sx every day, head pain not constant but constant dizziness.   Objective Measure: SLS  Details: 30\" or more eyes open, closed 3\" on R and L     Outcome Measures (most recent score):  Dizziness Handicap Inventory (score out of 100) A decrease in score by 17.18 or greater indicates a clinically significant change in symptoms.: 54(on 20 was 38)       Goals:  Goal Identifier pain   Goal Description pt will note a decrease average pain by 50% or better and carry over to improved sleep by waking 50% less often due to pain   Target Date 20   Date Met  20   Progress:     Goal Identifier function   Goal Description pt will note a decrease in dizziness " sx and further pain decrease and carry over to improved functional level as measured by DI score decrease from 56 to 30 or less   Target Date 04/08/21   Date Met      Progress:       Progress Toward Goals:   Progress this reporting period: overall pt is having less pain and understands home program and how her neck can affect her head symptoms. Dizziness is not gone but less so did not meet DI goal.     Plan:  Discharge from therapy. But, Plan to keep patient's chart open another month and if no further PT appointments in that time then this note will become the discharge summary.      Discharge:    Reason for Discharge: Patient chooses to discontinue therapy.    Equipment Issued: none    Discharge Plan: Patient to continue home program. If no more PT is scheduled in the next month then this note will become the discharge summary.

## 2021-04-20 ENCOUNTER — OFFICE VISIT (OUTPATIENT)
Dept: FAMILY MEDICINE | Facility: CLINIC | Age: 66
End: 2021-04-20
Payer: COMMERCIAL

## 2021-04-20 VITALS
OXYGEN SATURATION: 99 % | RESPIRATION RATE: 16 BRPM | WEIGHT: 169 LBS | DIASTOLIC BLOOD PRESSURE: 74 MMHG | TEMPERATURE: 97.5 F | SYSTOLIC BLOOD PRESSURE: 118 MMHG | BODY MASS INDEX: 30.91 KG/M2 | HEART RATE: 88 BPM

## 2021-04-20 DIAGNOSIS — I10 ESSENTIAL HYPERTENSION: Primary | ICD-10-CM

## 2021-04-20 DIAGNOSIS — R63.4 WEIGHT LOSS: ICD-10-CM

## 2021-04-20 DIAGNOSIS — R73.01 ELEVATED FASTING GLUCOSE: ICD-10-CM

## 2021-04-20 DIAGNOSIS — G43.909 MIGRAINE WITHOUT STATUS MIGRAINOSUS, NOT INTRACTABLE, UNSPECIFIED MIGRAINE TYPE: ICD-10-CM

## 2021-04-20 DIAGNOSIS — H81.10 BENIGN PAROXYSMAL POSITIONAL VERTIGO, UNSPECIFIED LATERALITY: ICD-10-CM

## 2021-04-20 DIAGNOSIS — I10 BENIGN ESSENTIAL HYPERTENSION: ICD-10-CM

## 2021-04-20 LAB
HBA1C MFR BLD: 5.9 % (ref 0–5.6)
TSH SERPL DL<=0.005 MIU/L-ACNC: 2.18 MU/L (ref 0.4–4)

## 2021-04-20 PROCEDURE — 99214 OFFICE O/P EST MOD 30 MIN: CPT | Performed by: FAMILY MEDICINE

## 2021-04-20 PROCEDURE — 84443 ASSAY THYROID STIM HORMONE: CPT | Performed by: FAMILY MEDICINE

## 2021-04-20 PROCEDURE — 83036 HEMOGLOBIN GLYCOSYLATED A1C: CPT | Performed by: FAMILY MEDICINE

## 2021-04-20 PROCEDURE — 36415 COLL VENOUS BLD VENIPUNCTURE: CPT | Performed by: FAMILY MEDICINE

## 2021-04-20 RX ORDER — NORTRIPTYLINE HCL 10 MG
CAPSULE ORAL
COMMUNITY
Start: 2021-03-24 | End: 2021-05-27 | Stop reason: SINTOL

## 2021-04-20 ASSESSMENT — PAIN SCALES - GENERAL: PAINLEVEL: NO PAIN (0)

## 2021-04-20 NOTE — PROGRESS NOTES
Assessment & Plan     Essential hypertension  Patient's blood pressure here today was very good and she is reporting very low normal blood pressures at home which she also says is associated with increased dizziness.  We will have her cut back on her hydrochlorothiazide to 12.5 mg daily and monitor blood pressures over the course of the next 2 weeks, checking a couple times daily and reporting is back to determine if she is tolerating this drop in medication and next steps.  Potassium level was normal and no concerns about need for replacement.  Renal function was also noted to be normal.    Elevated fasting glucose  Was noted on a routine screening blood work.  - Hemoglobin A1c was mildly increased at 5.9% we discussed implications of being prediabetic.  She has been using a keto diet and carefully watching calories at this time.  We will continue to monitor this closely with routine blood work and following fasting blood sugars.  No medication is indicated at this time.  She is not interested in full diabetic education at this time.    Weight loss  Initially successful but subsequently has plateaued by monitoring caloric intake.  Initially was on 1200 myriam a day with some weight loss and when that stopped she dropped her caloric intake down to 1000 myriam/day and is failing to see any further weight loss.  TSH was checked and was unremarkable.  We discussed physical activity as part of a weight loss program and due to her dizziness and vertigo she states she has not been able to do any sustained exercise programs.  She is hopeful she will be able to add that back into her regimen.    Benign paroxysmal positional vertigo, unspecified laterality  This continues to persist despite trials of medications and use of Epley maneuvers.  She gets most relief from oxazepam but has noted inadvertently placed that pill bottle and to a pill repository at the pharmacy.  I advised that she consider talking with pharmacy staff to  see if she could retrieve that bottle and if not she would need to contact Dr. Mina, the prescriber, to send a new prescription at work with pharmacy in getting her current prescription replaced.    Migraine without status migrainosus, not intractable, unspecified migraine type  She will continue on her current regimen of nortriptyline.  Will take effect will take Effexor off of her medication list.    We also reviewed her health maintenance gaps and she is due for a 5-year Pap cotest as well as a mammogram and she will consider getting those scheduled in the near future.  We discussed numerous vaccines that she is flagging for and she reported she is not interested in any vaccines at this time, including Covid vaccine.  6}      Return in about 3 months (around 7/20/2021) for in person, Routine preventive.       Gregory G. Schoen, MD  [facility] Stacie is a 65 year old who presents for the following health issues     HPI     Hypertension Follow-up      Do you check your blood pressure regularly outside of the clinic? Yes     Are you following a low salt diet? Yes    Are your blood pressures ever more than 140 on the top number (systolic) OR more   than 90 on the bottom number (diastolic), for example 140/90? No     I had received a neurologic consultation note on Stacie and within it was a recommendation to follow-up with me regarding elevated blood pressure.  It was noted in her chart review that she had not been seen for blood pressure check in over a year so we asked her to come in for follow-up.  She states that she has been monitoring her diet and attempting to lose weight which initially was successful but seems to have plateaued at her current level.  However she has noticed improvement in her blood pressures and most often notes them to be under 120 systolic and on occasion can be very low and as low as 99/73.  She has been seeing neurology for ongoing issues  with migraine headaches and vertigo and is also seeing ENT for vertigo.  She has been doing Epley maneuvers and eventually has been prescribed oxazepam to use for more severe vertigo.  She has found that she needs to take 1 tablet of this daily in order to manage her symptoms and it has been somewhat effective.    She denies issues with chest pains, palpitations or shortness of breath.  Also reports no issues with edema.  She just had a follow-up visit with her neurology provider on 4/5/2021.  That note was reviewed and there is a recommendation for a trial of Effexor which the patient found she did not tolerate and does not plan on continuing.  She had been on nortriptyline and was to stop that but reports she will be going back on that now for help in her vertigo and headache symptoms.  There is a recommendation to wean her oxazepam but she finds that that is necessary more so than other medications in regard to its effectiveness.  Interestingly she wanted to dispose of her Effexor and brought all of her pills with her and inadvertently put her oxazepam tablets into the pharmacy container.  She was asking how she could go about either getting her oxazepam back or her medication refilled.   website was reviewed and this was just filled 6 days ago as a 30 tablet supply with instructions to use 1 tablet daily as needed.        Review of Systems   CONSTITUTIONAL: NEGATIVE for fever, chills, change in weight  ENT/MOUTH: NEGATIVE for ear, mouth and throat problems  RESP: NEGATIVE for significant cough or SOB  CV: NEGATIVE for chest pain, palpitations or peripheral edema  Neuro: Dizziness/vertigo persists.      Objective    /74 (Cuff Size: Adult Regular)   Pulse 88   Temp 97.5  F (36.4  C) (Temporal)   Resp 16   Wt 76.7 kg (169 lb)   LMP 05/13/2004   SpO2 99%   BMI 30.91 kg/m    Body mass index is 30.91 kg/m .  Physical Exam   Alert and oriented, in no acute distress.  PERRL, EOMI.  Neck supple, no  adenopathy, no thyromegaly.  Lungs were clear to auscultation.  Heart was regular without murmurs clicks or rubs.  Extremities without edema.      Component      Latest Ref Rng & Units 3/25/2021 4/20/2021   Sodium      133 - 144 mmol/L 140    Potassium      3.4 - 5.3 mmol/L 3.6    Chloride      94 - 109 mmol/L 107    Carbon Dioxide      20 - 32 mmol/L 27    Anion Gap      3 - 14 mmol/L 6    Glucose      70 - 99 mg/dL 116 (H)    Urea Nitrogen      7 - 30 mg/dL 14    Creatinine      0.52 - 1.04 mg/dL 0.67    GFR Estimate      >60 mL/min/1.73:m2 >90    GFR Estimate If Black      >60 mL/min/1.73:m2 >90    Calcium      8.5 - 10.1 mg/dL 9.3    Cholesterol      <200 mg/dL 219 (H)    Triglycerides      <150 mg/dL 186 (H)    HDL Cholesterol      >49 mg/dL 42 (L)    LDL Cholesterol Calculated      <100 mg/dL 140 (H)    Non HDL Cholesterol      <130 mg/dL 177 (H)    Creatinine Urine      mg/dL 137    Albumin Urine mg/L      mg/L 30    Albumin Urine mg/g Cr      0 - 25 mg/g Cr 21.90    Hemoglobin A1C      0 - 5.6 %  5.9 (H)   TSH      0.40 - 4.00 mU/L  2.18

## 2021-04-20 NOTE — NURSING NOTE
Prior to immunization administration, verified patients identity using patient s name and date of birth. Please see Immunization Activity for additional information.     Screening Questionnaire for Adult Immunization    Are you sick today?   No   Do you have allergies to medications, food, a vaccine component or latex?   No   Have you ever had a serious reaction after receiving a vaccination?   No   Do you have a long-term health problem with heart, lung, kidney, or metabolic disease (e.g., diabetes), asthma, a blood disorder, no spleen, complement component deficiency, a cochlear implant, or a spinal fluid leak?  Are you on long-term aspirin therapy?   No   Do you have cancer, leukemia, HIV/AIDS, or any other immune system problem?   No   Do you have a parent, brother, or sister with an immune system problem?   No   In the past 3 months, have you taken medications that affect  your immune system, such as prednisone, other steroids, or anticancer drugs; drugs for the treatment of rheumatoid arthritis, Crohn s disease, or psoriasis; or have you had radiation treatments?   No   Have you had a seizure, or a brain or other nervous system problem?   No   During the past year, have you received a transfusion of blood or blood    products, or been given immune (gamma) globulin or antiviral drug?   No   For women: Are you pregnant or is there a chance you could become       pregnant during the next month?   No   Have you received any vaccinations in the past 4 weeks?   No     Immunization questionnaire answers were all negative.        Per orders of Dr. Schoen, injection of pneumovax given by Celina Woodall MA. Patient instructed to remain in clinic for 15 minutes afterwards, and to report any adverse reaction to me immediately.       Screening performed by Celina Woodall MA on 4/20/2021 at 4:26 PM.

## 2021-04-21 PROBLEM — H81.10 BENIGN POSITIONAL VERTIGO: Status: ACTIVE | Noted: 2020-03-01

## 2021-04-21 RX ORDER — HYDROCHLOROTHIAZIDE 25 MG/1
12.5 TABLET ORAL DAILY
Qty: 30 TABLET | Refills: 1 | COMMUNITY
Start: 2021-04-21 | End: 2021-06-30

## 2021-05-27 ENCOUNTER — VIRTUAL VISIT (OUTPATIENT)
Dept: NEUROLOGY | Facility: CLINIC | Age: 66
End: 2021-05-27
Payer: COMMERCIAL

## 2021-05-27 DIAGNOSIS — Z87.898 HISTORY OF DIZZINESS: ICD-10-CM

## 2021-05-27 DIAGNOSIS — G43.109 MIGRAINE WITH AURA AND WITHOUT STATUS MIGRAINOSUS, NOT INTRACTABLE: Primary | ICD-10-CM

## 2021-05-27 PROCEDURE — 99212 OFFICE O/P EST SF 10 MIN: CPT | Mod: 95 | Performed by: NURSE PRACTITIONER

## 2021-05-27 NOTE — LETTER
5/27/2021       RE: Stacie Hair  [address]  [place] [address]     Dear Colleague,    Thank you for referring your patient, Stacie Hair, to the [facility] [place] at [facility]. Please see a copy of my visit note below.    Stacie is a 65 year old who is being evaluated via a billable video visit.      How would you like to obtain your AVS? LogRhythmharAcer  If the video visit is dropped, the invitation should be resent by: [phone]  Will anyone else be joining your video visit? no  {If patient encounters technical issues they should call [phone]}    Video Start Time: 5:34 PM  Video-Visit Details    Type of service:  Video Visit    Video End Time:5:51 PM    Originating Location (pt. Location): Home    Distant Location (provider location):  [facility] [place]     Platform used for Video Visit: Viewabill    CC:  Headache follow up   Interval History:  Initial Headache Clinic visit on 3/1/2021, see note for details.  Headache treatment tried  Venlafaxine caused headaches worsening for about 2 weeks   Nortriptyline -has been for a couple of weeks and started having memory problems.   Patient tried topiramate -was causing headache.   Blood pressure 104-128/77 at home averaging   Staying hydrated  Losing weight -has been counting the calories and keeps track of blood pressure  Takes oxazepam for dizziness and has been taking it for about 5 month and has been helping  Went to her daughter who is a dentist and made a TMJ appliance and improvement with shoulder pain, jaw and neck pain.   No migraine headache since April but had ocular migraine headaches -3 in the past 2 months.   No headaches.   Still dizzy but periods that less or more but TMJ appliance helped. Patient has been taking oxazepam for dizziness -outside ENT -Dr Guillen  Has seen her regular PCP -higher blood glucose level and trying  to get her weight down and diet.     Dizziness while walking on the hard surfaces-walking and movements make patient dizzy and head movements -BPV/vestibulitis. Improved some but still some residual dizziness that does not go.     Migraine headaches are under control. Has been taking vitamin B2     Plan:  Follow up as needed -Wait and see if headaches were worsening. Will not start any new treatment for now due to symptoms improvement and side effects with medications in the past.   In person visit for the exam if symptoms were getting worse.     PMH, allergies and current prescription medications reviewed    Patient appears  alert and no in apparent acute distress,  mentation appears normal, judgement and insight intact, normal speech.    A/P: As discussed above    I discussed all my recommendations with Stacie Hair who verbalizes understanding and comfortable with the plan.  All of patient's questions were answered from the best of my knowledge.  Patient is in agreement with the plan.     18 minutes spent on the date of the encounter doing chart review, history, treatment,  exam, documentation and further activities as noted above    RUPA Henry, CNP [facility]  Headache certified  [facility]                  Again, thank you for allowing me to participate in the care of your patient.      Sincerely,    RUPA Avelar CNP

## 2021-05-27 NOTE — PROGRESS NOTES
Stacie is a 65 year old who is being evaluated via a billable video visit.      How would you like to obtain your AVS? Digital Dandelion  If the video visit is dropped, the invitation should be resent by: [phone]  Will anyone else be joining your video visit? no      Video Start Time: 5:34 PM  Video-Visit Details    Type of service:  Video Visit    Video End Time:5:51 PM    Originating Location (pt. Location): Home    Distant Location (provider location):  [facility]     Platform used for Video Visit: Ideagen    CC:  Headache follow up   Interval History:  Initial Headache Clinic visit on 3/1/2021, see note for details.  Headache treatment tried  Venlafaxine caused headaches worsening for about 2 weeks   Nortriptyline -has been for a couple of weeks and started having memory problems.   Patient tried topiramate -was causing headache.   Blood pressure 104-128/77 at home averaging   Staying hydrated  Losing weight -has been counting the calories and keeps track of blood pressure  Takes oxazepam for dizziness and has been taking it for about 5 month and has been helping  Went to her daughter who is a dentist and made a TMJ appliance and improvement with shoulder pain, jaw and neck pain.   No migraine headache since April but had ocular migraine headaches -3 in the past 2 months.   No headaches.   Still dizzy but periods that less or more but TMJ appliance helped. Patient has been taking oxazepam for dizziness -outside ENT -Dr Guillen  Has seen her regular PCP -higher blood glucose level and trying to get her weight down and diet.     Dizziness while walking on the hard surfaces-walking and movements make patient dizzy and head movements -BPV/vestibulitis. Improved some but still some residual dizziness that does not go.     Migraine headaches are under control. Has been taking vitamin B2     Plan:  Follow up as needed -Wait and see if headaches were worsening. Will not start any new  treatment for now due to symptoms improvement and side effects with medications in the past.   In person visit for the exam if symptoms were getting worse.     PMH, allergies and current prescription medications reviewed    Patient appears  alert and no in apparent acute distress,  mentation appears normal, judgement and insight intact, normal speech.    A/P: As discussed above    I discussed all my recommendations with Stacie Hair who verbalizes understanding and comfortable with the plan.  All of patient's questions were answered from the best of my knowledge.  Patient is in agreement with the plan.     18 minutes spent on the date of the encounter doing chart review, history, treatment,  exam, documentation and further activities as noted above    RUPA Henry, CNP [facility]  Headache certified  [facility]

## 2021-07-09 DIAGNOSIS — R42 VERTIGO: ICD-10-CM

## 2021-07-09 NOTE — TELEPHONE ENCOUNTER
Routing refill request to provider for review/approval because:  Drug not active on patient's medication list    Alea Allison RN

## 2021-07-09 NOTE — TELEPHONE ENCOUNTER
Patient is requesting oxazepam 10mg caps. I don't see that on their file.          Thank You,  Carlos Masterson, Pharmacy [facility] [place]

## 2021-07-11 RX ORDER — OXAZEPAM 10 MG
10 CAPSULE ORAL 2 TIMES DAILY PRN
Qty: 40 CAPSULE | Refills: 1 | Status: SHIPPED | OUTPATIENT
Start: 2021-07-11 | End: 2021-09-15

## 2021-08-28 ENCOUNTER — HEALTH MAINTENANCE LETTER (OUTPATIENT)
Age: 66
End: 2021-08-28

## 2021-09-14 DIAGNOSIS — R42 VERTIGO: ICD-10-CM

## 2021-09-15 RX ORDER — OXAZEPAM 10 MG
10 CAPSULE ORAL 2 TIMES DAILY PRN
Qty: 40 CAPSULE | Refills: 1 | Status: SHIPPED | OUTPATIENT
Start: 2021-09-15 | End: 2021-12-01

## 2021-09-15 NOTE — TELEPHONE ENCOUNTER
Oxazepam      Last Written Prescription Date:  7/11/2021  Last Fill Quantity: 40,   # refills: 1  Last Office Visit: 4/20/2021  Future Office visit:       Routing refill request to provider for review/approval because:  Drug not on the [facility], [facility] or [facility] refill protocol or controlled substance

## 2021-10-16 ENCOUNTER — HOSPITAL ENCOUNTER (EMERGENCY)
Facility: CLINIC | Age: 66
Discharge: HOME | End: 2021-10-16
Attending: FAMILY MEDICINE | Admitting: FAMILY MEDICINE
Payer: COMMERCIAL

## 2021-10-16 ENCOUNTER — APPOINTMENT (OUTPATIENT)
Dept: CT IMAGING | Facility: CLINIC | Age: 66
End: 2021-10-16
Attending: EMERGENCY MEDICINE
Payer: COMMERCIAL

## 2021-10-16 ENCOUNTER — APPOINTMENT (OUTPATIENT)
Dept: GENERAL RADIOLOGY | Facility: CLINIC | Age: 66
End: 2021-10-16
Attending: FAMILY MEDICINE
Payer: COMMERCIAL

## 2021-10-16 VITALS
OXYGEN SATURATION: 91 % | HEIGHT: 62 IN | WEIGHT: 170 LBS | HEART RATE: 98 BPM | BODY MASS INDEX: 31.28 KG/M2 | RESPIRATION RATE: 21 BRPM | DIASTOLIC BLOOD PRESSURE: 82 MMHG | TEMPERATURE: 97.7 F | SYSTOLIC BLOOD PRESSURE: 121 MMHG

## 2021-10-16 DIAGNOSIS — R07.9 CHEST PAIN, UNSPECIFIED TYPE: ICD-10-CM

## 2021-10-16 LAB
ALBUMIN SERPL-MCNC: 3.7 G/DL (ref 3.4–5)
ALP SERPL-CCNC: 82 U/L (ref 40–150)
ALT SERPL W P-5'-P-CCNC: 58 U/L (ref 0–50)
ANION GAP SERPL CALCULATED.3IONS-SCNC: 5 MMOL/L (ref 3–14)
AST SERPL W P-5'-P-CCNC: 27 U/L (ref 0–45)
BASOPHILS # BLD AUTO: 0 10E3/UL (ref 0–0.2)
BASOPHILS NFR BLD AUTO: 0 %
BILIRUB SERPL-MCNC: 0.3 MG/DL (ref 0.2–1.3)
BUN SERPL-MCNC: 11 MG/DL (ref 7–30)
CALCIUM SERPL-MCNC: 8.5 MG/DL (ref 8.5–10.1)
CHLORIDE BLD-SCNC: 108 MMOL/L (ref 94–109)
CO2 SERPL-SCNC: 25 MMOL/L (ref 20–32)
CREAT SERPL-MCNC: 0.64 MG/DL (ref 0.52–1.04)
D DIMER PPP FEU-MCNC: 0.27 UG/ML FEU (ref 0–0.5)
EOSINOPHIL # BLD AUTO: 0.3 10E3/UL (ref 0–0.7)
EOSINOPHIL NFR BLD AUTO: 4 %
ERYTHROCYTE [DISTWIDTH] IN BLOOD BY AUTOMATED COUNT: 13.4 % (ref 10–15)
GFR SERPL CREATININE-BSD FRML MDRD: >90 ML/MIN/1.73M2
GLUCOSE BLD-MCNC: 120 MG/DL (ref 70–99)
HCT VFR BLD AUTO: 42.8 % (ref 35–47)
HGB BLD-MCNC: 14.3 G/DL (ref 11.7–15.7)
IMM GRANULOCYTES # BLD: 0 10E3/UL
IMM GRANULOCYTES NFR BLD: 0 %
LIPASE SERPL-CCNC: 131 U/L (ref 73–393)
LYMPHOCYTES # BLD AUTO: 1.5 10E3/UL (ref 0.8–5.3)
LYMPHOCYTES NFR BLD AUTO: 20 %
MCH RBC QN AUTO: 30.2 PG (ref 26.5–33)
MCHC RBC AUTO-ENTMCNC: 33.4 G/DL (ref 31.5–36.5)
MCV RBC AUTO: 91 FL (ref 78–100)
MONOCYTES # BLD AUTO: 0.5 10E3/UL (ref 0–1.3)
MONOCYTES NFR BLD AUTO: 6 %
NEUTROPHILS # BLD AUTO: 5.4 10E3/UL (ref 1.6–8.3)
NEUTROPHILS NFR BLD AUTO: 70 %
NRBC # BLD AUTO: 0 10E3/UL
NRBC BLD AUTO-RTO: 0 /100
PLATELET # BLD AUTO: 260 10E3/UL (ref 150–450)
POTASSIUM BLD-SCNC: 3.4 MMOL/L (ref 3.4–5.3)
PROT SERPL-MCNC: 8.1 G/DL (ref 6.8–8.8)
RBC # BLD AUTO: 4.73 10E6/UL (ref 3.8–5.2)
SARS-COV-2 RNA RESP QL NAA+PROBE: NEGATIVE
SODIUM SERPL-SCNC: 138 MMOL/L (ref 133–144)
TROPONIN I SERPL-MCNC: <0.015 UG/L (ref 0–0.04)
TROPONIN I SERPL-MCNC: <0.015 UG/L (ref 0–0.04)
WBC # BLD AUTO: 7.8 10E3/UL (ref 4–11)

## 2021-10-16 PROCEDURE — 71045 X-RAY EXAM CHEST 1 VIEW: CPT

## 2021-10-16 PROCEDURE — 250N000011 HC RX IP 250 OP 636: Performed by: EMERGENCY MEDICINE

## 2021-10-16 PROCEDURE — 84484 ASSAY OF TROPONIN QUANT: CPT | Mod: 91 | Performed by: FAMILY MEDICINE

## 2021-10-16 PROCEDURE — 87635 SARS-COV-2 COVID-19 AMP PRB: CPT | Performed by: EMERGENCY MEDICINE

## 2021-10-16 PROCEDURE — 250N000012 HC RX MED GY IP 250 OP 636 PS 637: Performed by: EMERGENCY MEDICINE

## 2021-10-16 PROCEDURE — 93005 ELECTROCARDIOGRAM TRACING: CPT

## 2021-10-16 PROCEDURE — 84484 ASSAY OF TROPONIN QUANT: CPT | Performed by: FAMILY MEDICINE

## 2021-10-16 PROCEDURE — 36415 COLL VENOUS BLD VENIPUNCTURE: CPT | Performed by: FAMILY MEDICINE

## 2021-10-16 PROCEDURE — 250N000011 HC RX IP 250 OP 636: Performed by: FAMILY MEDICINE

## 2021-10-16 PROCEDURE — 99285 EMERGENCY DEPT VISIT HI MDM: CPT | Mod: 25

## 2021-10-16 PROCEDURE — 80053 COMPREHEN METABOLIC PANEL: CPT | Performed by: FAMILY MEDICINE

## 2021-10-16 PROCEDURE — 93005 ELECTROCARDIOGRAM TRACING: CPT | Mod: 76

## 2021-10-16 PROCEDURE — 96374 THER/PROPH/DIAG INJ IV PUSH: CPT

## 2021-10-16 PROCEDURE — 85025 COMPLETE CBC W/AUTO DIFF WBC: CPT | Performed by: FAMILY MEDICINE

## 2021-10-16 PROCEDURE — 93010 ELECTROCARDIOGRAM REPORT: CPT | Mod: 76 | Performed by: FAMILY MEDICINE

## 2021-10-16 PROCEDURE — 250N000013 HC RX MED GY IP 250 OP 250 PS 637: Performed by: FAMILY MEDICINE

## 2021-10-16 PROCEDURE — 71250 CT THORAX DX C-: CPT

## 2021-10-16 PROCEDURE — C9803 HOPD COVID-19 SPEC COLLECT: HCPCS

## 2021-10-16 PROCEDURE — 83690 ASSAY OF LIPASE: CPT | Performed by: EMERGENCY MEDICINE

## 2021-10-16 PROCEDURE — 250N000009 HC RX 250: Performed by: FAMILY MEDICINE

## 2021-10-16 PROCEDURE — 96375 TX/PRO/DX INJ NEW DRUG ADDON: CPT

## 2021-10-16 PROCEDURE — 85379 FIBRIN DEGRADATION QUANT: CPT | Performed by: FAMILY MEDICINE

## 2021-10-16 PROCEDURE — 93010 ELECTROCARDIOGRAM REPORT: CPT | Performed by: FAMILY MEDICINE

## 2021-10-16 PROCEDURE — 99285 EMERGENCY DEPT VISIT HI MDM: CPT | Mod: 25 | Performed by: FAMILY MEDICINE

## 2021-10-16 RX ORDER — NITROGLYCERIN 0.4 MG/1
0.4 TABLET SUBLINGUAL EVERY 5 MIN PRN
Status: DISCONTINUED | OUTPATIENT
Start: 2021-10-16 | End: 2021-10-16 | Stop reason: HOSPADM

## 2021-10-16 RX ORDER — OXYCODONE HYDROCHLORIDE 5 MG/1
5 TABLET ORAL EVERY 6 HOURS PRN
Qty: 12 TABLET | Refills: 0 | Status: SHIPPED | OUTPATIENT
Start: 2021-10-16 | End: 2021-10-16 | Stop reason: SINTOL

## 2021-10-16 RX ORDER — CYCLOBENZAPRINE HCL 10 MG
5-10 TABLET ORAL 3 TIMES DAILY PRN
Qty: 30 TABLET | Refills: 0 | Status: SHIPPED | OUTPATIENT
Start: 2021-10-16

## 2021-10-16 RX ORDER — LORAZEPAM 2 MG/ML
1 INJECTION INTRAMUSCULAR ONCE
Status: COMPLETED | OUTPATIENT
Start: 2021-10-16 | End: 2021-10-16

## 2021-10-16 RX ORDER — HYDROMORPHONE HYDROCHLORIDE 1 MG/ML
0.2 INJECTION, SOLUTION INTRAMUSCULAR; INTRAVENOUS; SUBCUTANEOUS ONCE
Status: COMPLETED | OUTPATIENT
Start: 2021-10-16 | End: 2021-10-16

## 2021-10-16 RX ORDER — HYDROMORPHONE HYDROCHLORIDE 1 MG/ML
0.5 INJECTION, SOLUTION INTRAMUSCULAR; INTRAVENOUS; SUBCUTANEOUS
Status: COMPLETED | OUTPATIENT
Start: 2021-10-16 | End: 2021-10-16

## 2021-10-16 RX ORDER — KETOROLAC TROMETHAMINE 15 MG/ML
15 INJECTION, SOLUTION INTRAMUSCULAR; INTRAVENOUS ONCE
Status: COMPLETED | OUTPATIENT
Start: 2021-10-16 | End: 2021-10-16

## 2021-10-16 RX ORDER — METHYLPREDNISOLONE 4 MG
TABLET, DOSE PACK ORAL
Qty: 21 TABLET | Refills: 0 | Status: ON HOLD | OUTPATIENT
Start: 2021-10-16 | End: 2022-01-26

## 2021-10-16 RX ORDER — TRAMADOL HYDROCHLORIDE 50 MG/1
50 TABLET ORAL EVERY 6 HOURS PRN
Qty: 10 TABLET | Refills: 0 | Status: SHIPPED | OUTPATIENT
Start: 2021-10-16 | End: 2021-10-19

## 2021-10-16 RX ADMIN — NITROGLYCERIN 0.4 MG: 0.4 TABLET SUBLINGUAL at 04:47

## 2021-10-16 RX ADMIN — LIDOCAINE HYDROCHLORIDE 30 ML: 20 SOLUTION ORAL; TOPICAL at 06:55

## 2021-10-16 RX ADMIN — NITROGLYCERIN 0.4 MG: 0.4 TABLET SUBLINGUAL at 05:49

## 2021-10-16 RX ADMIN — KETOROLAC TROMETHAMINE 15 MG: 15 INJECTION, SOLUTION INTRAMUSCULAR; INTRAVENOUS at 13:28

## 2021-10-16 RX ADMIN — LORAZEPAM 1 MG: 2 INJECTION INTRAMUSCULAR; INTRAVENOUS at 09:24

## 2021-10-16 RX ADMIN — HYDROMORPHONE HYDROCHLORIDE 0.5 MG: 1 INJECTION, SOLUTION INTRAMUSCULAR; INTRAVENOUS; SUBCUTANEOUS at 10:10

## 2021-10-16 RX ADMIN — DEXAMETHASONE 10 MG: 2 TABLET ORAL at 13:28

## 2021-10-16 RX ADMIN — NITROGLYCERIN 0.4 MG: 0.4 TABLET SUBLINGUAL at 04:58

## 2021-10-16 RX ADMIN — HYDROMORPHONE HYDROCHLORIDE 0.2 MG: 1 INJECTION, SOLUTION INTRAMUSCULAR; INTRAVENOUS; SUBCUTANEOUS at 06:04

## 2021-10-16 ASSESSMENT — MIFFLIN-ST. JEOR: SCORE: 1269.36

## 2021-10-16 NOTE — ED TRIAGE NOTES
Pt presents from home with chest pain in center of chest that woke her from sleep 1.5 hours ago. Hx, Vertigo, hypertension

## 2021-10-16 NOTE — ED PROVIDER NOTES
History     Chief Complaint   Patient presents with     Chest Pain     Headache     HPI  Stacie Hair is a 66 year old female who presents with chest pain that started in the middle the night and woke her up.  Patient describes the pain is in the center of her chest and does not radiate.  It does seem to wax and wane though.  It has not resolved though.  Patient denies any shortness of breath.  Denies any nausea or any vomiting.  Nothing makes the pain better or worse.  Patient has not had pain like this before.    Allergies:  Allergies   Allergen Reactions     Codeine      Eggs [Eggs]      Milk Products      Nsaids      Intolerance       Problem List:    Patient Active Problem List    Diagnosis Date Noted     Benign positional vertigo 2020     Priority: Medium     Hyperlipidemia LDL goal <130 2018     Priority: Medium     Hypertension      Priority: Medium     Family history of coronary artery disease 2011     Priority: Medium     Female stress incontinence 10/24/2002     Priority: Medium     Migraines      Priority: Medium        Past Medical History:    Past Medical History:   Diagnosis Date     Benign positional vertigo 3/2020     Herniated discs      Hypertension      Migraines -     Myalgia and myositis, unspecified      Tinnitus        Past Surgical History:    Past Surgical History:   Procedure Laterality Date     C  DELIVERY ONLY      , Transfusion     C LIGATE FALLOPIAN TUBE,POSTPARTUM      Tubal Ligation     COLONOSCOPY  2011    Procedure:COMBINED COLONOSCOPY, SINGLE BIOPSY/POLYPECTOMY BY BIOPSY; Colonoscopy, with polypectomy by biopsy; Surgeon:MADELEINE MONROY; Location:[facility]     HC EXCISION BREAST LESION, OPEN >=1      benign, age 20       Family History:    Family History   Problem Relation Age of Onset     Hypertension Mother      Heart Disease Father         MI       Social History:  Marital Status:   [2]  Social History  "    Tobacco Use     Smoking status: Former Smoker     Packs/day: 0.00     Years: 0.00     Pack years: 0.00     Smokeless tobacco: Never Used     Tobacco comment: 1980-quit   Substance Use Topics     Alcohol use: Yes     Alcohol/week: 0.0 standard drinks     Comment: occasional     Drug use: No        Medications:    Calcium Carb-Cholecalciferol (CALCIUM + D3 PO)  Cyanocobalamin (VITAMIN B-12 PO)  hydrochlorothiazide (HYDRODIURIL) 25 MG tablet  Magnesium 80 MG TABS  NEW MED  Omega-3 Fatty Acids (OMEGA 3 PO)  oxazepam (SERAX) 10 MG capsule  UNABLE TO FIND          Review of Systems   All other systems reviewed and are negative.      Physical Exam   BP: (!) 178/107  Pulse: 100  Temp: 97.7  F (36.5  C)  Resp: 20  Height: 157.5 cm (5' 2\")  Weight: 77.1 kg (170 lb)  SpO2: 97 %      Physical Exam  Vitals and nursing note reviewed.   Constitutional:       General: She is not in acute distress.     Appearance: She is well-developed. She is not diaphoretic.   HENT:      Head: Normocephalic and atraumatic.      Nose: Nose normal.      Mouth/Throat:      Pharynx: No oropharyngeal exudate.   Eyes:      Conjunctiva/sclera: Conjunctivae normal.   Cardiovascular:      Rate and Rhythm: Normal rate and regular rhythm.      Heart sounds: Normal heart sounds. No murmur heard.   No friction rub.   Pulmonary:      Effort: Pulmonary effort is normal. No respiratory distress.      Breath sounds: Normal breath sounds. No stridor. No wheezing or rales.   Abdominal:      General: Bowel sounds are normal. There is no distension.      Palpations: Abdomen is soft. There is no mass.      Tenderness: There is no abdominal tenderness. There is no guarding.   Musculoskeletal:         General: No tenderness. Normal range of motion.      Cervical back: Normal range of motion and neck supple.   Skin:     General: Skin is warm and dry.      Capillary Refill: Capillary refill takes less than 2 seconds.      Findings: No erythema.   Neurological:      " Mental Status: She is alert and oriented to person, place, and time.   Psychiatric:         Judgment: Judgment normal.         ED Course        Procedures              EKG Interpretation:      Interpreted by Dex Solorio MD  Time reviewed: now  Symptoms at time of EKG: None   Rhythm: normal sinus   Rate: Normal  Axis: Normal  Ectopy: none  Conduction: left bundle branch block (complete)  ST Segments/ T Waves: Non-specific ST-T wave changes  Q Waves: nonspecific  Comparison to prior: No old EKG available    Clinical Impression: left bundle branch block         EKG Interpretation #2:      Interpreted by Dex Solorio MD  Time reviewed:now   Symptoms at time of EKG: None   Rhythm: Normal sinus   Rate: Normal  Axis: Normal  Ectopy: None  Conduction: Left bundle branch block (complete)  ST Segments/ T Waves: No ST-T wave changes and No acute ischemic changes  Q Waves: Nonspecific  Comparison to prior: Unchanged    Clinical Impression: left bundle branch block                  Results for orders placed or performed during the hospital encounter of 10/16/21 (from the past 24 hour(s))   CBC with platelets differential    Narrative    The following orders were created for panel order CBC with platelets differential.  Procedure                               Abnormality         Status                     ---------                               -----------         ------                     CBC with platelets and d...[[id number]]                      Final result                 Please view results for these tests on the individual orders.   D dimer quantitative   Result Value Ref Range    D-Dimer Quantitative 0.27 0.00 - 0.50 ug/mL FEU    Narrative    This D-dimer assay is intended for use in conjunction with a clinical pretest probability assessment model to exclude pulmonary embolism (PE) and deep venous thrombosis (DVT) in outpatients suspected of PE or DVT. The cut-off value is 0.50 ug/mL FEU.    Comprehensive metabolic panel   Result Value Ref Range    Sodium 138 133 - 144 mmol/L    Potassium 3.4 3.4 - 5.3 mmol/L    Chloride 108 94 - 109 mmol/L    Carbon Dioxide (CO2) 25 20 - 32 mmol/L    Anion Gap 5 3 - 14 mmol/L    Urea Nitrogen 11 7 - 30 mg/dL    Creatinine 0.64 0.52 - 1.04 mg/dL    Calcium 8.5 8.5 - 10.1 mg/dL    Glucose 120 (H) 70 - 99 mg/dL    Alkaline Phosphatase 82 40 - 150 U/L    AST 27 0 - 45 U/L    ALT 58 (H) 0 - 50 U/L    Protein Total 8.1 6.8 - 8.8 g/dL    Albumin 3.7 3.4 - 5.0 g/dL    Bilirubin Total 0.3 0.2 - 1.3 mg/dL    GFR Estimate >90 >60 mL/min/1.73m2   Troponin I   Result Value Ref Range    Troponin I <0.015 0.000 - 0.045 ug/L   CBC with platelets and differential   Result Value Ref Range    WBC Count 7.8 4.0 - 11.0 10e3/uL    RBC Count 4.73 3.80 - 5.20 10e6/uL    Hemoglobin 14.3 11.7 - 15.7 g/dL    Hematocrit 42.8 35.0 - 47.0 %    MCV 91 78 - 100 fL    MCH 30.2 26.5 - 33.0 pg    MCHC 33.4 31.5 - 36.5 g/dL    RDW 13.4 10.0 - 15.0 %    Platelet Count 260 150 - 450 10e3/uL    % Neutrophils 70 %    % Lymphocytes 20 %    % Monocytes 6 %    % Eosinophils 4 %    % Basophils 0 %    % Immature Granulocytes 0 %    NRBCs per 100 WBC 0 <1 /100    Absolute Neutrophils 5.4 1.6 - 8.3 10e3/uL    Absolute Lymphocytes 1.5 0.8 - 5.3 10e3/uL    Absolute Monocytes 0.5 0.0 - 1.3 10e3/uL    Absolute Eosinophils 0.3 0.0 - 0.7 10e3/uL    Absolute Basophils 0.0 0.0 - 0.2 10e3/uL    Absolute Immature Granulocytes 0.0 <=0.0 10e3/uL    Absolute NRBCs 0.0 10e3/uL   XR Chest Port 1 View    Narrative    EXAM: XR CHEST PORT 1 VIEW  LOCATION: [facility]  DATE/TIME: 10/16/2021 5:05 AM    INDICATION: chest pain  COMPARISON: None.      Impression    IMPRESSION: Mildly enlarged cardiac silhouette. Mild prominence of the basilar interstitium could reflect early fluid overload or inflammatory change. No pneumothorax or pleural effusion.       Medications   nitroGLYcerin (NITROSTAT)  sublingual tablet 0.4 mg (0.4 mg Sublingual Given 10/16/21 0549)   HYDROmorphone (PF) (DILAUDID) injection 0.2 mg (has no administration in time range)       This is a 66-year-old female who presents with chest pain that started 1.5 hours prior to arrival.  Initial EKG shows a left bundle branch block, there are no old ones to compare this to.  Patient was given some nitroglycerin which did cut her pain in half but did not make it go completely away.  She got a total of 3 which really did not help much.  She continues to complain of pain with inspiration.  The sounds pleuritic.  D-dimer was obtained and was negative though.  Because of the timing of her pain, I Nani get a second troponin at 4 hours which would be around 8:00.  I did get a second EKG which shows no change from earlier.  Patient will be signed out at change of shift to Dr. Marvin for disposition.    Assessments & Plan (with Medical Decision Making)  Atypical chest pain     I have reviewed the nursing notes.    I have reviewed the findings, diagnosis, plan and need for follow up with the patient.              10/16/2021   [facility] EMERGENCY DEPT     Dex Solroio MD  10/16/21 0603

## 2021-10-16 NOTE — ED PROVIDER NOTES
SIGN OUT NOTE    Patient signed out to me at change of shift by Dr. Solorio.     This is a 66-year-old female who presented with chest pain.  Her EKG showed a left bundle branch block.  She received nitroglycerin which decreased her pain from a 7 to a 10/10 but further doses did not improve the pain.  Her initial troponin was negative.  She did not improve with Dilaudid.  She was just given a GI cocktail this morning.    Plan is to evaluate if the GI cocktail helped.  Added lipase.  Second troponin at 8 AM.  Dispo pending results.    Patient second troponin was normal.    I went to examine the patient and she is crying, clutching her chest, complaining of sharp chest pain and therefore difficulty breathing.  I reviewed all of her history and labs and physical exam again with her.  She does acknowledge she was doing some heavy lifting of some bunk beds with her  in the last couple of days.  It is possible that this is musculoskeletal pain even coming from her spine.  Her lipase is normal, her LFTs are normal, her CBC, troponins, chemistry panels are all normal.  Chest x-ray is clear.  Negative D-dimer which significantly decreases chance for PE or dissection.    She actually notes that her pain increases with certain movements.  It is pleuritic in nature.  Unfortunately, she has a nonsteroidal anti-inflammatory allergy or intolerance.  I gave her a dose of Ativan and Dilaudid and she was able to rest.  I have elected to do a CT scan without contrast.    CT shows no pneumonia, no failure.  She has hepatic stenosis.  No other infiltrates or other abnormalities.    I discussed the results at length with the patient and her .  She has used ibuprofen intermittently.  I am going to give her 1 dose of Toradol and some Decadron to decrease inflammation.  Patient noted to have oxygen saturations 94 to 95% on room air when upright and speaking with me.  She does a lot of snoring and her O2 sats were a little lower  when falling asleep, around 90%.  We talked about sleep study.    I have had a lot of time to talk with the patient and her .  I think the plan will be to discharge her to home at this point with some pain medications.  She has tolerated Ultram in the past after speaking with pharmacy so this was ordered.  She was given some Flexeril for muscle spasm and a Medrol Dosepak to decrease pain and inflammation which may be coming from her neck and back as she notes some tightness and pain when moving.    I went to send a message to her primary care provider for recheck next week.  They may want to consider a stress test.  We also talked about talking to a chiropractor to see if some adjustment would help her.  However, if she has any worsening, changes or concerns return at any time.    Clinical impression:  (R07.9) Chest pain, unspecified type           Madisyn Marvin MD  10/17/21 0054

## 2021-10-16 NOTE — DISCHARGE INSTRUCTIONS
Your EKG did not show any acute abnormalities.  Your chest x-ray was clear.  Your CT scan was also normal and clear.    Your labs did not show any evidence for inflammation, infection, and your troponin/heart numbers were normal.    I am not sure if this pain is primarily musculoskeletal or if there is some other underlying cause.  I am going to send a message to your primary care provider as he may discuss possibly evaluating you with a stress test.    I recommend rest, ice or heat to painful areas.  You can use over-the-counter medications such as Tylenol if needed for pain.    Oxycodone if needed for severe pain.  This can cause constipation so take stool softeners if needed.  No driving while on this medication.  Flexeril if needed for muscle spasm or spasm-like pain.  This also can cause drowsiness and should not be taken when driving.  You were given 1 dose of a steroid called Decadron in the emergency department to decrease pain and inflammation.  If this does seem to have improved your symptoms, you can start the Medrol Dosepak.  This is a steroid that is tapered over the course of a week.  Take with food or milk.  Start this either tomorrow or Monday depending on your symptoms.    Consider talking to a chiropractor.    If you have significant worsening, changes or concerns return at any time.    I hope that you start to improve very quickly!

## 2021-10-23 ENCOUNTER — HEALTH MAINTENANCE LETTER (OUTPATIENT)
Age: 66
End: 2021-10-23

## 2021-11-29 DIAGNOSIS — R42 VERTIGO: ICD-10-CM

## 2021-11-30 NOTE — TELEPHONE ENCOUNTER
Serax      Last Written Prescription Date:  9/15/21  Last Fill Quantity: 40,   # refills: 0  Last Office Visit: 4/20/21  Future Office visit:       Routing refill request to provider for review/approval because:  Drug not on the [facility], [facility] or [facility] refill protocol or controlled substance    Zoya Nguyen RN

## 2021-12-01 RX ORDER — OXAZEPAM 10 MG
CAPSULE ORAL
Qty: 40 CAPSULE | Refills: 1 | Status: SHIPPED | OUTPATIENT
Start: 2021-12-01 | End: 2022-03-01

## 2022-01-25 ENCOUNTER — NURSE TRIAGE (OUTPATIENT)
Dept: FAMILY MEDICINE | Facility: CLINIC | Age: 67
End: 2022-01-25
Payer: COMMERCIAL

## 2022-01-25 ENCOUNTER — VIRTUAL VISIT (OUTPATIENT)
Dept: FAMILY MEDICINE | Facility: CLINIC | Age: 67
End: 2022-01-25
Payer: COMMERCIAL

## 2022-01-25 DIAGNOSIS — R06.2 WHEEZING: Primary | ICD-10-CM

## 2022-01-25 PROCEDURE — 99213 OFFICE O/P EST LOW 20 MIN: CPT | Mod: 95 | Performed by: NURSE PRACTITIONER

## 2022-01-25 RX ORDER — ALBUTEROL SULFATE 90 UG/1
2 AEROSOL, METERED RESPIRATORY (INHALATION) EVERY 6 HOURS
Qty: 18 G | Refills: 0 | Status: SHIPPED | OUTPATIENT
Start: 2022-01-25

## 2022-01-25 ASSESSMENT — ENCOUNTER SYMPTOMS: SHORTNESS OF BREATH: 1

## 2022-01-25 NOTE — PROGRESS NOTES
Stacie is a 66 year old who is being evaluated via a billable video visit.      How would you like to obtain your AVS? MyChart  If the video visit is dropped, the invitation should be resent by: Text to cell phone: [phone]  Will anyone else be joining your video visit? No    Video Start Time: unable to connect    Assessment & Plan     Wheezing  Home care instructions were reviewed with the patient. The risks, benefits and treatment options of prescribed medications or other treatments have been discussed with the patient. The patient verbalized their understanding and should call or follow up if no improvement or if they develop further problems.    - albuterol (PROAIR HFA/PROVENTIL HFA/VENTOLIN HFA) 108 (90 Base) MCG/ACT inhaler; Inhale 2 puffs into the lungs every 6 hours      [facility]   Stacie is a 66 year old who presents for the following health issues  accompanied by her daughter.    Shortness of Breath           Concern for COVID-19  About how many days ago did these symptoms start? 1/12/22  Is this your first visit for this illness? Yes  In the 14 days before your symptoms started, have you had close contact with someone with COVID-19 (Coronavirus)? Yes, I have been in contact with someone who has COVID-19/Coronavirus (confirmed by lab test).  Do you have a fever or chills? Yes, the highest temperature was 104.8    Are you having new or worsening difficulty breathing? Yes  Please describe what kind of difficulty you are having breathing:Mild dyspnea (able to do ADLs without difficulty, mild shortness of breath with activities such as climbing one or two flights of stairs or walking briskly)  Do you have new or worsening cough? Yes, it's a dry cough.   Have you had any new or unexplained body aches? No    Have you experienced any of the following NEW symptoms?    Headache: YES    Sore throat: YES    Loss of taste or smell:  YES    Chest pain: No    Diarrhea: YES    Rash: No  What treatments have you tried? OTC meds  Who do you live with?   Are you, or a household member, a healthcare worker or a ? daughter is a nurse flew in from [place] last night staying a while  Do you live in a nursing home, group home, or shelter? No  Do you have a way to get food/medications if quarantined? Yes, I have a friend or family member who can help me.              Review of Systems   Respiratory: Positive for shortness of breath.       Constitutional, HEENT, cardiovascular, pulmonary, GI, , musculoskeletal, neuro, skin, endocrine and psych systems are negative, except as otherwise noted.      Objective           Vitals:  No vitals were obtained today due to virtual visit.    Physical Exam   GENERAL: Healthy, alert and no distress  EYES: Eyes grossly normal to inspection.  No discharge or erythema, or obvious scleral/conjunctival abnormalities.  RESP: No audible wheeze, cough, or visible cyanosis.  No visible retractions or increased work of breathing.    SKIN: Visible skin clear. No significant rash, abnormal pigmentation or lesions.  NEURO: Cranial nerves grossly intact.  Mentation and speech appropriate for age.  PSYCH: Mentation appears normal, affect normal/bright, judgement and insight intact, normal speech and appearance well-groomed.      Video-Visit Details    Type of service:  Video Visit    Video End Time:unable to hear patient needed to change to telephone encounter    Originating Location (pt. Location): Home    Distant Location (provider location):  [facility]     Platform used for Video Visit: Unable to complete video visit

## 2022-01-25 NOTE — TELEPHONE ENCOUNTER
"Nurse Triage SBAR  Is this a 2nd Level Triage? NO    SITUATION:                                                      Stacie Hair is a 66 year old female with SOB, and positive COVID exposure    BACKGROUND:                                                      Hx: Patient's daughter called in (she is a nurse) requesting nebulizer medication for patient due to increased SOB. Patient's  is currently in the hospital with COVID. Patient developed symptoms \"10 days ago\". Daughter states patient is SOB at rest, has a RR > 22, and is SOB while talking. Headaches also present.     NURSE ASSESSMENT:                                                      ER Evaluation for SOB, daughter refused and wanted to see provider virtually. She feels patient just needs a neb to \"open up her airway\" and then she will be fine.     (See information below for more triage details.)  RECOMMENDATION(S) and PLAN:                                                    (What do you need from this individual?)  Protocol Recommended Disposition: To ED, another person to drive   Will comply with recommendation: no - does not want to go, scheduled virtual visit with provider in 30 minutes.     Encourage to return call clinic triage nurse if further questions/concerns that may come up or if symptoms do not improve, worsen, or new symptoms develop.    NOTES: Disposition was determined by the first positive assessment question, therefore all previous assessment questions were negative.  Guideline used: Difficulty breathing- A-OH    Trish Oquendo RN on 1/25/2022 at 9:36 AM      Reason for Disposition    MODERATE difficulty breathing (e.g., speaks in phrases, SOB even at rest, pulse 100-120) of new onset or worse than normal    Additional Information    Negative: Breathing stopped and hasn't returned    Negative: Choking on something    Negative: SEVERE difficulty breathing (e.g., struggling for each breath, speaks in single words, pulse > " 120)    Negative: Bluish (or gray) lips or face    Negative: Difficult to awaken or acting confused (e.g., disoriented, slurred speech)    Negative: Passed out (i.e., fainted, collapsed and was not responding)    Negative: Wheezing started suddenly after medicine, an allergic food, or bee sting    Negative: Stridor    Negative: Slow, shallow and weak breathing    Negative: Sounds like a life-threatening emergency to the triager    Protocols used: BREATHING DIFFICULTY-A-OH

## 2022-01-26 ENCOUNTER — TELEPHONE (OUTPATIENT)
Dept: FAMILY MEDICINE | Facility: CLINIC | Age: 67
End: 2022-01-26

## 2022-01-26 ENCOUNTER — APPOINTMENT (OUTPATIENT)
Dept: CT IMAGING | Facility: CLINIC | Age: 67
End: 2022-01-26
Attending: EMERGENCY MEDICINE
Payer: COMMERCIAL

## 2022-01-26 ENCOUNTER — HOSPITAL ENCOUNTER (INPATIENT)
Facility: CLINIC | Age: 67
LOS: 12 days | Discharge: HOME | End: 2022-02-07
Attending: EMERGENCY MEDICINE | Admitting: PEDIATRICS
Payer: COMMERCIAL

## 2022-01-26 ENCOUNTER — APPOINTMENT (OUTPATIENT)
Dept: GENERAL RADIOLOGY | Facility: CLINIC | Age: 67
End: 2022-01-26
Attending: EMERGENCY MEDICINE
Payer: COMMERCIAL

## 2022-01-26 DIAGNOSIS — J12.82 PNEUMONIA DUE TO 2019 NOVEL CORONAVIRUS: Primary | ICD-10-CM

## 2022-01-26 DIAGNOSIS — U07.1 PNEUMONIA DUE TO 2019 NOVEL CORONAVIRUS: Primary | ICD-10-CM

## 2022-01-26 DIAGNOSIS — E11.9 NEW ONSET TYPE 2 DIABETES MELLITUS (H): ICD-10-CM

## 2022-01-26 DIAGNOSIS — R09.02 HYPOXEMIA: ICD-10-CM

## 2022-01-26 DIAGNOSIS — I26.94 MULTIPLE SUBSEGMENTAL PULMONARY EMBOLI WITHOUT ACUTE COR PULMONALE (H): ICD-10-CM

## 2022-01-26 DIAGNOSIS — J96.01 ACUTE RESPIRATORY FAILURE WITH HYPOXIA (H): ICD-10-CM

## 2022-01-26 DIAGNOSIS — R26.81 GAIT INSTABILITY: ICD-10-CM

## 2022-01-26 DIAGNOSIS — Z87.891 PERSONAL HISTORY OF TOBACCO USE, PRESENTING HAZARDS TO HEALTH: ICD-10-CM

## 2022-01-26 DIAGNOSIS — R42 VERTIGO: ICD-10-CM

## 2022-01-26 DIAGNOSIS — R09.02 HYPOXIA: ICD-10-CM

## 2022-01-26 PROBLEM — R65.10 SIRS (SYSTEMIC INFLAMMATORY RESPONSE SYNDROME) (H): Status: ACTIVE | Noted: 2022-01-26

## 2022-01-26 PROBLEM — E87.1 HYPONATREMIA: Status: ACTIVE | Noted: 2022-01-26

## 2022-01-26 PROBLEM — R74.8 ABNORMAL TRANSAMINASES: Status: ACTIVE | Noted: 2022-01-26

## 2022-01-26 PROBLEM — I10 HYPERTENSION: Status: ACTIVE | Noted: 2017-01-01

## 2022-01-26 PROBLEM — E27.9 ADRENAL ABNORMALITY (H): Status: ACTIVE | Noted: 2022-01-26

## 2022-01-26 PROBLEM — I44.7 LBBB (LEFT BUNDLE BRANCH BLOCK): Status: ACTIVE | Noted: 2022-01-26

## 2022-01-26 PROBLEM — D72.810: Status: ACTIVE | Noted: 2022-01-26

## 2022-01-26 PROBLEM — E87.6 HYPOPOTASSEMIA: Status: ACTIVE | Noted: 2022-01-26

## 2022-01-26 LAB
ALBUMIN SERPL-MCNC: 2.1 G/DL (ref 3.4–5)
ALP SERPL-CCNC: 142 U/L (ref 40–150)
ALT SERPL W P-5'-P-CCNC: 58 U/L (ref 0–50)
ANION GAP SERPL CALCULATED.3IONS-SCNC: 9 MMOL/L (ref 3–14)
AST SERPL W P-5'-P-CCNC: 60 U/L (ref 0–45)
BASE EXCESS BLDA CALC-SCNC: 4.9 MMOL/L (ref -9–1.8)
BASE EXCESS BLDV CALC-SCNC: 5.2 MMOL/L (ref -7.7–1.9)
BASOPHILS # BLD AUTO: 0 10E3/UL (ref 0–0.2)
BASOPHILS NFR BLD AUTO: 0 %
BILIRUB SERPL-MCNC: 1.2 MG/DL (ref 0.2–1.3)
BUN SERPL-MCNC: 26 MG/DL (ref 7–30)
CALCIUM SERPL-MCNC: 8.5 MG/DL (ref 8.5–10.1)
CHLORIDE BLD-SCNC: 95 MMOL/L (ref 94–109)
CO2 SERPL-SCNC: 27 MMOL/L (ref 20–32)
CREAT SERPL-MCNC: 0.67 MG/DL (ref 0.52–1.04)
CRP SERPL-MCNC: 418 MG/L (ref 0–8)
D DIMER PPP FEU-MCNC: >20 UG/ML FEU (ref 0–0.5)
EOSINOPHIL # BLD AUTO: 0 10E3/UL (ref 0–0.7)
EOSINOPHIL NFR BLD AUTO: 0 %
ERYTHROCYTE [DISTWIDTH] IN BLOOD BY AUTOMATED COUNT: 13.5 % (ref 10–15)
FLUAV RNA SPEC QL NAA+PROBE: NEGATIVE
FLUBV RNA RESP QL NAA+PROBE: NEGATIVE
GFR SERPL CREATININE-BSD FRML MDRD: >90 ML/MIN/1.73M2
GLUCOSE BLD-MCNC: 134 MG/DL (ref 70–99)
GLUCOSE BLDC GLUCOMTR-MCNC: 159 MG/DL (ref 70–99)
HCO3 BLD-SCNC: 25 MMOL/L (ref 21–28)
HCO3 BLDV-SCNC: 29 MMOL/L (ref 21–28)
HCT VFR BLD AUTO: 41.8 % (ref 35–47)
HGB BLD-MCNC: 14.2 G/DL (ref 11.7–15.7)
HOLD SPECIMEN: NORMAL
HOLD SPECIMEN: NORMAL
IMM GRANULOCYTES # BLD: 0.5 10E3/UL
IMM GRANULOCYTES NFR BLD: 4 %
INR PPP: 1.09 (ref 0.85–1.15)
LACTATE SERPL-SCNC: 2 MMOL/L (ref 0.7–2)
LYMPHOCYTES # BLD AUTO: 0.7 10E3/UL (ref 0.8–5.3)
LYMPHOCYTES NFR BLD AUTO: 5 %
MAGNESIUM SERPL-MCNC: 2.6 MG/DL (ref 1.6–2.3)
MCH RBC QN AUTO: 30.2 PG (ref 26.5–33)
MCHC RBC AUTO-ENTMCNC: 34 G/DL (ref 31.5–36.5)
MCV RBC AUTO: 89 FL (ref 78–100)
MONOCYTES # BLD AUTO: 0.3 10E3/UL (ref 0–1.3)
MONOCYTES NFR BLD AUTO: 2 %
NEUTROPHILS # BLD AUTO: 11.4 10E3/UL (ref 1.6–8.3)
NEUTROPHILS NFR BLD AUTO: 89 %
NRBC # BLD AUTO: 0 10E3/UL
NRBC BLD AUTO-RTO: 0 /100
NT-PROBNP SERPL-MCNC: 893 PG/ML (ref 0–900)
O2/TOTAL GAS SETTING VFR VENT: 100 %
O2/TOTAL GAS SETTING VFR VENT: 80 %
OXYHGB MFR BLD: 96 % (ref 92–100)
PCO2 BLD: 25 MM HG (ref 35–45)
PCO2 BLDV: 37 MM HG (ref 40–50)
PH BLD: 7.4 [PH] (ref 7.35–7.45)
PH BLDV: 7.5 [PH] (ref 7.32–7.43)
PHOSPHATE SERPL-MCNC: 3.9 MG/DL (ref 2.5–4.5)
PLATELET # BLD AUTO: 262 10E3/UL (ref 150–450)
PO2 BLD: 121 MM HG (ref 80–105)
PO2 BLDV: 39 MM HG (ref 25–47)
POTASSIUM BLD-SCNC: 3 MMOL/L (ref 3.4–5.3)
PROT SERPL-MCNC: 8 G/DL (ref 6.8–8.8)
RADIOLOGIST FLAGS: ABNORMAL
RBC # BLD AUTO: 4.7 10E6/UL (ref 3.8–5.2)
SARS-COV-2 RNA RESP QL NAA+PROBE: POSITIVE
SODIUM SERPL-SCNC: 131 MMOL/L (ref 133–144)
TROPONIN I SERPL HS-MCNC: 11 NG/L
UFH PPP CHRO-ACNC: 0.55 IU/ML
WBC # BLD AUTO: 13 10E3/UL (ref 4–11)

## 2022-01-26 PROCEDURE — 82040 ASSAY OF SERUM ALBUMIN: CPT | Performed by: EMERGENCY MEDICINE

## 2022-01-26 PROCEDURE — 85520 HEPARIN ASSAY: CPT | Performed by: PEDIATRICS

## 2022-01-26 PROCEDURE — 83605 ASSAY OF LACTIC ACID: CPT | Performed by: EMERGENCY MEDICINE

## 2022-01-26 PROCEDURE — 85025 COMPLETE CBC W/AUTO DIFF WBC: CPT | Performed by: EMERGENCY MEDICINE

## 2022-01-26 PROCEDURE — 96366 THER/PROPH/DIAG IV INF ADDON: CPT | Performed by: EMERGENCY MEDICINE

## 2022-01-26 PROCEDURE — 83735 ASSAY OF MAGNESIUM: CPT | Performed by: PEDIATRICS

## 2022-01-26 PROCEDURE — 99291 CRITICAL CARE FIRST HOUR: CPT | Mod: 25 | Performed by: EMERGENCY MEDICINE

## 2022-01-26 PROCEDURE — 200N000001 HC R&B ICU

## 2022-01-26 PROCEDURE — 999N000157 HC STATISTIC RCP TIME EA 10 MIN

## 2022-01-26 PROCEDURE — 99292 CRITICAL CARE ADDL 30 MIN: CPT | Performed by: EMERGENCY MEDICINE

## 2022-01-26 PROCEDURE — 99223 1ST HOSP IP/OBS HIGH 75: CPT | Mod: AI | Performed by: PEDIATRICS

## 2022-01-26 PROCEDURE — 84484 ASSAY OF TROPONIN QUANT: CPT | Performed by: EMERGENCY MEDICINE

## 2022-01-26 PROCEDURE — 93010 ELECTROCARDIOGRAM REPORT: CPT | Performed by: EMERGENCY MEDICINE

## 2022-01-26 PROCEDURE — 84100 ASSAY OF PHOSPHORUS: CPT | Performed by: PEDIATRICS

## 2022-01-26 PROCEDURE — 71275 CT ANGIOGRAPHY CHEST: CPT

## 2022-01-26 PROCEDURE — 93005 ELECTROCARDIOGRAM TRACING: CPT | Performed by: EMERGENCY MEDICINE

## 2022-01-26 PROCEDURE — 71045 X-RAY EXAM CHEST 1 VIEW: CPT

## 2022-01-26 PROCEDURE — 96376 TX/PRO/DX INJ SAME DRUG ADON: CPT | Performed by: EMERGENCY MEDICINE

## 2022-01-26 PROCEDURE — 85379 FIBRIN DEGRADATION QUANT: CPT | Performed by: EMERGENCY MEDICINE

## 2022-01-26 PROCEDURE — 96375 TX/PRO/DX INJ NEW DRUG ADDON: CPT | Performed by: EMERGENCY MEDICINE

## 2022-01-26 PROCEDURE — 250N000011 HC RX IP 250 OP 636: Performed by: EMERGENCY MEDICINE

## 2022-01-26 PROCEDURE — 87636 SARSCOV2 & INF A&B AMP PRB: CPT | Performed by: EMERGENCY MEDICINE

## 2022-01-26 PROCEDURE — 250N000009 HC RX 250: Performed by: EMERGENCY MEDICINE

## 2022-01-26 PROCEDURE — 36415 COLL VENOUS BLD VENIPUNCTURE: CPT | Performed by: PEDIATRICS

## 2022-01-26 PROCEDURE — 82805 BLOOD GASES W/O2 SATURATION: CPT | Performed by: EMERGENCY MEDICINE

## 2022-01-26 PROCEDURE — 36600 WITHDRAWAL OF ARTERIAL BLOOD: CPT

## 2022-01-26 PROCEDURE — 999N000156 HC STATISTIC RCP CONSULT EA 30 MIN

## 2022-01-26 PROCEDURE — 87040 BLOOD CULTURE FOR BACTERIA: CPT | Performed by: EMERGENCY MEDICINE

## 2022-01-26 PROCEDURE — 83880 ASSAY OF NATRIURETIC PEPTIDE: CPT | Performed by: PEDIATRICS

## 2022-01-26 PROCEDURE — 999N000105 HC STATISTIC NO DOCUMENTATION TO SUPPORT CHARGE

## 2022-01-26 PROCEDURE — 36415 COLL VENOUS BLD VENIPUNCTURE: CPT | Performed by: EMERGENCY MEDICINE

## 2022-01-26 PROCEDURE — 80053 COMPREHEN METABOLIC PANEL: CPT | Performed by: EMERGENCY MEDICINE

## 2022-01-26 PROCEDURE — C9803 HOPD COVID-19 SPEC COLLECT: HCPCS | Performed by: EMERGENCY MEDICINE

## 2022-01-26 PROCEDURE — 94660 CPAP INITIATION&MGMT: CPT

## 2022-01-26 PROCEDURE — 96365 THER/PROPH/DIAG IV INF INIT: CPT | Mod: 59 | Performed by: EMERGENCY MEDICINE

## 2022-01-26 PROCEDURE — 82803 BLOOD GASES ANY COMBINATION: CPT | Performed by: EMERGENCY MEDICINE

## 2022-01-26 PROCEDURE — 250N000011 HC RX IP 250 OP 636: Performed by: PEDIATRICS

## 2022-01-26 PROCEDURE — 86140 C-REACTIVE PROTEIN: CPT | Performed by: EMERGENCY MEDICINE

## 2022-01-26 PROCEDURE — 85610 PROTHROMBIN TIME: CPT | Performed by: EMERGENCY MEDICINE

## 2022-01-26 RX ORDER — LORAZEPAM 2 MG/ML
0.5 INJECTION INTRAMUSCULAR EVERY 4 HOURS PRN
Status: DISCONTINUED | OUTPATIENT
Start: 2022-01-26 | End: 2022-01-30

## 2022-01-26 RX ORDER — IOPAMIDOL 755 MG/ML
500 INJECTION, SOLUTION INTRAVASCULAR ONCE
Status: COMPLETED | OUTPATIENT
Start: 2022-01-26 | End: 2022-01-26

## 2022-01-26 RX ORDER — DEXTROSE MONOHYDRATE, SODIUM CHLORIDE, AND POTASSIUM CHLORIDE 50; 1.49; 9 G/1000ML; G/1000ML; G/1000ML
INJECTION, SOLUTION INTRAVENOUS CONTINUOUS
Status: DISCONTINUED | OUTPATIENT
Start: 2022-01-26 | End: 2022-01-26

## 2022-01-26 RX ORDER — NICOTINE POLACRILEX 4 MG
15-30 LOZENGE BUCCAL
Status: DISCONTINUED | OUTPATIENT
Start: 2022-01-26 | End: 2022-02-07 | Stop reason: HOSPADM

## 2022-01-26 RX ORDER — ALBUTEROL SULFATE 0.83 MG/ML
2.5 SOLUTION RESPIRATORY (INHALATION) EVERY 4 HOURS PRN
Status: DISCONTINUED | OUTPATIENT
Start: 2022-01-26 | End: 2022-01-26

## 2022-01-26 RX ORDER — ALBUTEROL SULFATE 0.83 MG/ML
2.5 SOLUTION RESPIRATORY (INHALATION)
Status: DISCONTINUED | OUTPATIENT
Start: 2022-01-26 | End: 2022-01-26

## 2022-01-26 RX ORDER — DEXAMETHASONE SODIUM PHOSPHATE 10 MG/ML
6 INJECTION, SOLUTION INTRAMUSCULAR; INTRAVENOUS ONCE
Status: COMPLETED | OUTPATIENT
Start: 2022-01-26 | End: 2022-01-26

## 2022-01-26 RX ORDER — DEXTROSE MONOHYDRATE 25 G/50ML
25-50 INJECTION, SOLUTION INTRAVENOUS
Status: DISCONTINUED | OUTPATIENT
Start: 2022-01-26 | End: 2022-02-07 | Stop reason: HOSPADM

## 2022-01-26 RX ORDER — DEXAMETHASONE SODIUM PHOSPHATE 10 MG/ML
6 INJECTION, SOLUTION INTRAMUSCULAR; INTRAVENOUS EVERY 24 HOURS
Status: DISCONTINUED | OUTPATIENT
Start: 2022-01-27 | End: 2022-01-30

## 2022-01-26 RX ORDER — HEPARIN SODIUM 10000 [USP'U]/100ML
0-5000 INJECTION, SOLUTION INTRAVENOUS CONTINUOUS
Status: DISCONTINUED | OUTPATIENT
Start: 2022-01-26 | End: 2022-01-30

## 2022-01-26 RX ORDER — DEXAMETHASONE SODIUM PHOSPHATE 10 MG/ML
6 INJECTION, SOLUTION INTRAMUSCULAR; INTRAVENOUS DAILY
Status: DISCONTINUED | OUTPATIENT
Start: 2022-01-26 | End: 2022-01-26

## 2022-01-26 RX ORDER — POTASSIUM CHLORIDE 7.45 MG/ML
10 INJECTION INTRAVENOUS
Status: COMPLETED | OUTPATIENT
Start: 2022-01-26 | End: 2022-01-27

## 2022-01-26 RX ORDER — DEXMEDETOMIDINE HYDROCHLORIDE 4 UG/ML
.1-1.2 INJECTION, SOLUTION INTRAVENOUS CONTINUOUS
Status: DISCONTINUED | OUTPATIENT
Start: 2022-01-26 | End: 2022-01-29

## 2022-01-26 RX ORDER — ALBUTEROL SULFATE 90 UG/1
2 AEROSOL, METERED RESPIRATORY (INHALATION) EVERY 4 HOURS PRN
Status: DISCONTINUED | OUTPATIENT
Start: 2022-01-26 | End: 2022-02-07 | Stop reason: HOSPADM

## 2022-01-26 RX ADMIN — HEPARIN SODIUM 1450 UNITS/HR: 10000 INJECTION, SOLUTION INTRAVENOUS at 13:39

## 2022-01-26 RX ADMIN — IOPAMIDOL 70 ML: 755 INJECTION, SOLUTION INTRAVENOUS at 12:10

## 2022-01-26 RX ADMIN — SODIUM CHLORIDE 70 ML: 9 INJECTION, SOLUTION INTRAVENOUS at 12:10

## 2022-01-26 RX ADMIN — POTASSIUM CHLORIDE 10 MEQ: 7.46 INJECTION, SOLUTION INTRAVENOUS at 23:05

## 2022-01-26 RX ADMIN — POTASSIUM CHLORIDE 10 MEQ: 7.46 INJECTION, SOLUTION INTRAVENOUS at 20:45

## 2022-01-26 RX ADMIN — POTASSIUM CHLORIDE 10 MEQ: 7.46 INJECTION, SOLUTION INTRAVENOUS at 19:57

## 2022-01-26 RX ADMIN — DEXAMETHASONE SODIUM PHOSPHATE 6 MG: 10 INJECTION, SOLUTION INTRAMUSCULAR; INTRAVENOUS at 11:21

## 2022-01-26 ASSESSMENT — ACTIVITIES OF DAILY LIVING (ADL)
DOING_ERRANDS_INDEPENDENTLY_DIFFICULTY: NO
DIFFICULTY_EATING/SWALLOWING: NO
DIFFICULTY_COMMUNICATING: NO
ADLS_ACUITY_SCORE: 12
HEARING_DIFFICULTY_OR_DEAF: NO
PATIENT_/_FAMILY_COMMUNICATION_STYLE: SPOKEN LANGUAGE (ENGLISH OR BILINGUAL)
ADLS_ACUITY_SCORE: 12
ADLS_ACUITY_SCORE: 6
CONCENTRATING,_REMEMBERING_OR_MAKING_DECISIONS_DIFFICULTY: NO
ADLS_ACUITY_SCORE: 6
ADLS_ACUITY_SCORE: 6
WEAR_GLASSES_OR_BLIND: OTHER (SEE COMMENTS)
ADLS_ACUITY_SCORE: 6
TOILETING_ISSUES: NO
WALKING_OR_CLIMBING_STAIRS_DIFFICULTY: NO
FALL_HISTORY_WITHIN_LAST_SIX_MONTHS: NO
ADLS_ACUITY_SCORE: 12
ADLS_ACUITY_SCORE: 6
DRESSING/BATHING_DIFFICULTY: NO

## 2022-01-26 ASSESSMENT — MIFFLIN-ST. JEOR
SCORE: 1339.67
SCORE: 1301.12

## 2022-01-26 NOTE — PROGRESS NOTES
S-(situation): Patient arrives to room 215 via cart from ED.    B-(background): Covid +, Bilateral Pulmonary Emboli    A-(assessment): VSS on BiPap. Patient desated to 79-84%% when mask removed for less than 2 minutes. Patient A&Ox4. Lungs are diminished throughout with fine crackles in bases. Non-Frequent strong dry cough. Patient reports breathing is feeling better but still using accessory muscles with breaths and tachypnic intermittently. Abdomen soft, bowels hypoactive patient reports no BM x4 days. Patient reports poor intake for 1-2 weeks with change in taste (nutrition consult placed). Purewic in place for bedrest, patient has not voided. Skin intact. Independent with minimal moves in bed.     R-(recommendations): Orders reviewed with patient. Will monitor patient per MD orders.     Inpatient nursing criteria listed below were met:    Health care directives status obtained and documented: N/A  SCD's Documented: Yes  Skin issues/needs documented:NA  Isolation addressed and Signage used: Yes  Fall Prevention: Care plan updated Yes Education given and documented Yes  Care Plan initiated and Co-Morbidities added: Yes  Education Assessment documented:Yes  Admission Education Documented: Yes  If present CAUTI/CLABI Education done: NA  New medication patient education completed and documented (Possible Side Effects of Common Medications handout): Yes  Allergies Reviewed: Yes  Admission Medication Reconciliation completed: Yes  Home medications if not able to send immediately home with family stored here: NA  Reminder note placed in discharge instructions regarding home meds: NA  Individualized care needs/preferences addressed and charted: Yes

## 2022-01-26 NOTE — TELEPHONE ENCOUNTER
Daughter calling wanting to give update that patient went to the ED for worsening upper respiratory symptoms and that her father is also in the hospital.  Informed that provider only sees patients in the clinic and not in the ED or the hospital. She asked that we send provider the update. Cyndi Thomas LPN

## 2022-01-26 NOTE — ED NOTES
ED Nursing criteria listed below was addressed during verbal handoff:     Abnormal vitals: No  Abnormal results: Yes: Covid+, D-Dimer, CRP, K  Med Reconciliation completed: Yes  Meds given in ED: Yes, Dexamethasone, Refused Remdesivir  Any Overdue Meds: No  Core Measures: N/A  Isolation: Yes, COVID  Special needs: Yes, BIPAP  Skin assessment: No    Observation Patient  Education provided: N/A

## 2022-01-26 NOTE — PROGRESS NOTES
01/26/22 1100 01/26/22 1110   CPAP/BiPAP/Settings   IPAP/EPAP (cmH2O) 18/10 18/10   Rate (breaths/min) 16 16   Oxygen (%) 100 80   Timed Inspiration (sec) 1 1   IPAP RISE  Settings (V60) 1 1   CPAP/BiPAP Patient Parameters   IPAP (cm H2O) 18 cmH2O 18 cmH2O   EPAP (cm H2O) 10 cmH2O 10 cmH2O   Pressure Support (cm H2O) 14 cmH2O 14 cmH2O   RR Total (breaths/min) 51 breaths/min 45 breaths/min   Vt (mL) 385 mL 376 mL   Minute Ventilation (L/min) 12.6 L/min 17 L/min   Peak Inspiratory Pressure (cm H2O) 20 cmH2O 19 cmH2O     Patient is requiring BIPAP for ventilatory support  Trialed on HFNC and SpO2 dropped to the 70's from non-re breather mask  Crackles through out lower and middle lobes, upper lobes diminished  Tachynepia

## 2022-01-26 NOTE — PROGRESS NOTES
..    S- Respiratory Care Consultation    Stacie Hair    Age: 66 year old      Date of Admission:  1/26/2022    Reason for consult: Fever, dyspnea, chills, cough                         Chief complaint:   Assessment:   Positive SARS Cov2            History of Present Illness:   The patient had onset of symptoms 1/12/22 , dyspena, cough, fevers, diarrhea, loss of taste and smell, headache and soar throat.  Patient's  tested positive 1/10/2022 and is an covid- positive inpatient in the hospital  since 1/24/2022  Stacie has been staying with their daughter and has not had improvement since onset of symptoms 1/12/2022  Yesterday 1/25/2022 patient completed a virtual phone visit and was prescribed albuterol MDI.  Patient has felt no benefit from MDI treatments.       Assessment:   Lungs:   {Diminished in upper lobes.  Crackles through out lower and middle lobes     Oxygenation = Non-Rebreathing mask upon admishion gave SpO2 85%.  High flow nasal cannula at 100% FIO2 gave SpO2 77%.  BIPAP 18/10 , rate 16 FIO2 80% currently gives SpO2 = 93%    Respiratory rate 40-50 bpm on abmission, 40 bpm now    Does patient have home oxygen No      Cough: Frequent , productive     History is obtained from the patient      X-Ray:    CHEST PORTABLE ONE VIEW January 26, 2022 11:20 AM      HISTORY: Shortness of breath.     COMPARISON: Chest x-rays and CT dated 10/16/2021.     FINDINGS: Extensive bilateral pulmonary infiltrates (worse on the  right) are noted and are new since the prior studies from 2021.  Cardiac silhouette remains mildly enlarged. No pneumothorax or  significant pleural fluid collection.                                                                      IMPRESSION:  1. Bilateral pulmonary infiltrates could represent pulmonary edema or  infectious process such as COVID-19 pneumonia. Recommend clinical  correlation.  2. Mild cardiomegaly again noted.     I discussed the severe bilateral pulmonary infiltrates  with Dr. Leal  on 1/26/2022 at 11:25 AM.    CT:  CT CHEST PULMONARY EMBOLISM W CONTRAST 1/26/2022 12:34 PM     CLINICAL HISTORY: PE suspected, low/intermediate prob, positive  D-dimer. Shortness of breath. COVID19 and pneumonia.  TECHNIQUE: CT angiogram chest during arterial phase injection IV  contrast. 2D and 3D MIP reconstructions were performed by the CT  technologist. Dose reduction techniques were used.      CONTRAST: 70mL, Isovue 370     COMPARISON: 10/16/2021     FINDINGS:  ANGIOGRAM CHEST: Small filling defects in right lower lobe, right  middle lobe and left upper lobe segmental and subsegmental pulmonary  arteries, consistent with pulmonary emboli. Thoracic aorta is negative  for dissection. No CT evidence of right heart strain.     LUNGS AND PLEURA: Severe bilateral groundglass and patchy infiltrates  consistent with pneumonia. No pleural effusion.     MEDIASTINUM/AXILLAE: New mild hilar and mediastinal lymphadenopathy.  For example, there is a 2.1 cm precarinal lymph node (series 4, image  45) and a 2.0 cm right infrahilar lymph node (series 4, image 55). No  thoracic aortic aneurysm. No coronary artery calcifications. No  pericardial effusion.     UPPER ABDOMEN: Hepatic steatosis. Mild hypodense thickening of the  adrenal glands bilaterally without a focal mass, new or more prominent  since 10/16/2021.     MUSCULOSKELETAL: No concerning bone lesions.                                                                      IMPRESSION:  1.  Positive for bilateral segmental and subsegmental pulmonary  emboli.  2.  Severe bilateral infiltrates consistent with COVID 19 pneumonia.  3.  New mild mediastinal and hilar lymphadenopathy is nonspecific,  likely reactive.  4.  Stable hepatic steatosis.  5.  New mild hypodense thickening of the adrenal glands bilaterally  without a focal mass, of uncertain clinical significance. A follow-up  CT in 6 months can be considered to assess for changes, if  clinically  indicated.     [Critical Result: Pulmonary embolism]     Finding was identified on 2022 1:04 PM.            WAN OSWALD MD     ABG:  pH Arterial (no units)   Date Value   10/24/2002 6.5     No results found for: PO2  No results found for: PCO2  No results found for: HCO3    Pulmonary function testing:  PFT Latest Ref Rng & Units 3/8/2017   FVC L 2.33   FEV1 L 2.02   FVC% % 82   FEV1% % 90       The FVC, FEV1, FEV1/FVC ratio and YSK74-88% are within normal limits.  The inspiratory flow rates are reduced.  Lung volumes are within normal limits.  Following administration of bronchodilators, there is no significant response.  The diffusing capacity    is normal.  However, the diffusing capacity was not corrected for the patient's hemoglobin.   The results are within normal limits.   IMPRESSION:   Normal Pulmonary Function   ____________________________________________Kelly Robb      Specimen Collected: 03        .                  Past Medical History:     Past Medical History:   Diagnosis Date     Benign positional vertigo 3/2020     Herniated discs      Hypertension 2017     Migraines 1972-     Myalgia and myositis, unspecified     Fibromyalgia     Tinnitus              Past Surgical History:     Past Surgical History:   Procedure Laterality Date     COLONOSCOPY  2011    Procedure:COMBINED COLONOSCOPY, SINGLE BIOPSY/POLYPECTOMY BY BIOPSY; Colonoscopy, with polypectomy by biopsy; Surgeon:MADELEINE MONROY; Location:[facility]     HC EXCISION BREAST LESION, OPEN >=1      benign, age 20     ZZC  DELIVERY ONLY      , Transfusion     ZZC LIGATE FALLOPIAN TUBE,POSTPARTUM      Tubal Ligation             Social History:     Social History     Socioeconomic History     Marital status:      Spouse name: Oscar     Number of children: 3     Years of education: 16     Highest education level: Not on file   Occupational History     Occupation: artist   Tobacco Use      Smoking status: Former Smoker     Packs/day: 0.00     Years: 0.00     Pack years: 0.00     Smokeless tobacco: Never Used     Tobacco comment: 1980-quit   Vaping Use     Vaping Use: Never used   Substance and Sexual Activity     Alcohol use: Yes     Alcohol/week: 0.0 standard drinks     Comment: occasional     Drug use: No     Sexual activity: Yes     Partners: Male     Birth control/protection: Female Surgical   Other Topics Concern     Parent/sibling w/ CABG, MI or angioplasty before 65F 55M? Not Asked   Social History Narrative     Not on file     Social Determinants of Health     Financial Resource Strain: Not on file   Food Insecurity: Not on file   Transportation Needs: Not on file   Physical Activity: Not on file   Stress: Not on file   Social Connections: Not on file   Intimate Partner Violence: Not on file   Housing Stability: Not on file     Exposures:  COVID-19         Family History:     Family History   Problem Relation Age of Onset     Hypertension Mother      Heart Disease Father         MI     Family history              Allergies:   This patient is allergic to is allergic to codeine, eggs [eggs], milk products, and nsaids.          Medications:     No current facility-administered medications for this encounter.     Current Outpatient Medications   Medication Sig     albuterol (PROAIR HFA/PROVENTIL HFA/VENTOLIN HFA) 108 (90 Base) MCG/ACT inhaler Inhale 2 puffs into the lungs every 6 hours     Calcium Carb-Cholecalciferol (CALCIUM + D3 PO)  (Patient not taking: Reported on 1/25/2022)     Cyanocobalamin (VITAMIN B-12 PO)  (Patient not taking: Reported on 1/25/2022)     cyclobenzaprine (FLEXERIL) 10 MG tablet Take 0.5-1 tablets (5-10 mg) by mouth 3 times daily as needed for muscle spasms (Patient not taking: Reported on 1/25/2022)     hydrochlorothiazide (HYDRODIURIL) 25 MG tablet TAKE ONE TABLET BY MOUTH ONCE DAILY (Patient not taking: Reported on 1/25/2022)     Magnesium 80 MG TABS  (Patient not  taking: Reported on 1/25/2022)     methylPREDNISolone (MEDROL DOSEPAK) 4 MG tablet therapy pack Follow package instructions (Patient not taking: Reported on 1/25/2022)     NEW MED VitaPulse (Patient not taking: Reported on 1/25/2022)     Omega-3 Fatty Acids (OMEGA 3 PO) Take by mouth daily (Patient not taking: Reported on 1/25/2022)     oxazepam (SERAX) 10 MG capsule TAKE 1 CAPSULE BY MOUTH TWO TIMES A DAY AS NEEDED FOR ANXIETY (VERTIGO) (Patient not taking: Reported on 1/25/2022)     UNABLE TO FIND 1,515 mg MEDICATION NAME: Gabriel (Patient not taking: Reported on 1/25/2022)                  Recommendations:   Recommendations:  Dexamethazone,   Consider starting Remdesivir  Consider Baricitinib if worsening condishion  Consider checking a pro calcitonin since elevated WBC  Follow with ventilatory support, BIPAP,   Consider Intubation if worsening symptoms and hypoxia  Consider CT for elevated di dimer                  Result data:     Lab Results   Component Value Date    PH 6.5 10/24/2002     Lab Results   Component Value Date    WBC 13.0 (H) 01/26/2022    WBC 7.8 10/16/2021    WBC 6.2 08/27/2018    HGB 14.2 01/26/2022    HGB 14.3 10/16/2021    HGB 13.9 08/27/2018    HCT 41.8 01/26/2022    HCT 42.8 10/16/2021    HCT 42.6 08/27/2018    MCV 89 01/26/2022    MCV 91 10/16/2021    MCV 91 08/27/2018     01/26/2022     10/16/2021     08/27/2018     Lab Results   Component Value Date    DD >20.00 (HH) 01/26/2022    DD 0.27 10/16/2021    DD 0.3 08/21/2009     Lab Results   Component Value Date    HGB 14.2 01/26/2022    HGB 14.3 10/16/2021    HGB 13.9 08/27/2018             Kristina Riedel, RT R- RCP will follow

## 2022-01-26 NOTE — H&P
[facility]    History and Physical - Hospitalist Service       Date of Admission:  1/26/2022    Assessment & Plan      Stacie Hair is a 66 year old female admitted on 1/26/2022. She presents with symptoms, signs, and test results concerning for severe COVID-19 pneumonia and bilateral pulmonary emboli both of which are causing acute hypoxic respiratory failure and systemic inflammatory response syndrome.  She is at high risk for an adverse outcome including worsening respiratory failure and death, so hospitalization is considered medically necessary.  Based on the presently available information, hospitalization for at least 2 midnights is anticipated.    # Confirmed COVID-19 infection    # Acute Hypoxic Respiratory Failure secondary to COVID-19 infection     Symptom Onset unknown   Date of 1st Positive Test 1/26/22   Vaccination Status Not vaccinated       - COVID-19 special precautions, continuous pulse-ox  - Oxygen: continue current support with BiPAP; titrate to keep SpO2 between 90-96%  - Labs: Daily COVID labs ordered x4 days (CBC, BMP, D-dimer, CRP).  Continue to trend after 4 days as needed.   - Imaging: no additional imaging needed at this time  - Breathing treatments: no inhalers needed; avoid nebulizers in favor of MDIs   - IV fluids: not indicated at this time  - Antibiotics: not indicated   - COVID-Focused Medications: Dexamethasone 6 mg x 10 days or until hospital discharge, started on 1/26/22  - DVT Prophylaxis: at high risk of thrombotic complications due to COVID-19 (DDimer = >20.00 ug/mL FEU (Ref range: 0.00 - 0.50 ug/mL FEU) ).          - Already on therapeutic anticoagulation with IV heparin        - consider anticoag on discharge for 30 days & until return to normal mobility        Principal Problem:    Pneumonia due to 2019 novel coronavirus  Assessment: Symptom onset approximately January 12, tested positive for COVID-19 today, not previously vaccinated for  COVID-19, bilateral infiltrates, severe pneumonia with acute hypoxic respiratory failure and systemic inflammatory response syndrome  Plan: Admit to inpatient ICU, continue dexamethasone, not a candidate for remdesivir due to duration of symptoms and severity of illness, baricitinib considered but deferred at this time due to n.p.o. status and because patient may be a candidate for Actemra instead    Active Problems:    Acute respiratory failure with hypoxia (H)  Assessment: Severe life-threatening acute hypoxic respiratory failure with respiratory alkalosis, improved oxygenation on BiPAP, likely due to the combination of COVID-19 pneumonia and bilateral pulmonary emboli, PaO2 to FiO2 ratio 151 on BiPAP  Plan: Admit to ICU for close monitoring and anticipated continuous BiPAP for at least 24 to 48 hours while starting treatment for venous thromboembolism and COVID-19 pneumonia, possibly de-escalate BiPAP in 24 to 48 hours if able to wean FiO2, titrate IPAP and EPAP as well as FiO2 to optimize oxygenation with target saturations 90 to 96%      Multiple subsegmental pulmonary emboli without acute cor pulmonale (H)  Assessment: Bilateral pulmonary emboli noted radiographically, severely hypoxic but without overt signs of acute cor pulmonale although is at risk for pulmonary hypertension and cor pulmonale, suspect pulmonary emboli due to COVID-19 infection, no other known risk factors for venous thromboembolism  Plan: Therapeutic anticoagulation with IV heparin for now, anticipate transition to oral anticoagulation as respiratory status improves to complete at least a 3-month treatment course, obtain echocardiogram for evaluation for cor pulmonale and pulmonary hypertension      Hyponatremia-admit Na 131  Assessment: Mild likely due to chronic diuretic therapy and poor oral intake lately, may contribute to weakness  Plan: Limiting IV fluids due to COVID-19 but will administer a bolus of normal saline today and follow  sodium closely adjusting IV fluid therapy if necessary if hyponatremia persists or worsens      Hypopotassemia-admit K 3.0  Assessment: Mild to moderate also likely due to chronic diuretic therapy and poor oral intake as well as recent vomiting, may contribute to weakness  Plan: Start potassium supplementation per standard protocol      SIRS (systemic inflammatory response syndrome) (H)-tachycardia, tachypnea, leukocytosis  Assessment: SIRS probably due to COVID-19 pneumonia, bilateral pulmonary emboli, and acute respiratory failure, doubt bacterial infection at this time  Plan: Treat COVID-19 pneumonia and venous thromboembolism as well as acute respiratory failure and monitor for signs of SIRS, no antibiotics at this time unless there is increasing concern for bacterial infection      Hypertension  Assessment: Chronic and normally treated with hydrochlorothiazide  Plan: Holding diuretic at this time due to electrolyte disturbances and while n.p.o.      Abnormal transaminases  Assessment: Slight elevation of transaminases possibly due to COVID-19 infection  Plan: Monitor LFTs      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  Assessment: Adrenal glands incidentally noted to be thickened bilaterally on radiographic imaging which is new or worsened as compared with October 2021, unclear clinical significance but patient does have history of hypertension and is currently hypokalemic which raises the possibility of underlying hyperaldosteronism  Plan: No acute intervention, anticipate outpatient follow-up after recovery from acute illness      Lymphopenia due to COVID-19 virus  Assessment: Mildly lymphopenic due to COVID-19 infection  Plan: Monitor WBC    Obesity  Assessment: Chronic with BMI 32, increases risk for severe COVID-19 infection and pneumonia  Plan: As above    Diet:  N.p.o.  DVT Prophylaxis: IV heparin  Gomez Catheter: Not present  Central Lines: None  Cardiac Monitoring: None  Code Status:  Full  resuscitation per her expressed wishes at this time    Clinically Significant Risk Factors Present on Admission                Disposition Plan   Expected Discharge:  unknown  Anticipated discharge location:  Awaiting care coordination huddle  Delays:  Medical deterioration       The patient's care was discussed with the Bedside Nurse, Patient and Patient's Family.  Patient indicates that she would prefer to have her daughter be family contact and contact for medical decision making on her behalf if that becomes necessary.    Parker Caraballo MD  Hospitalist Service  [facility]  Securely message with the Vocera Web Console (learn more here)  Text page via [facility] Paging/Directory         ______________________________________________________________________    Chief Complaint   Shortness of breath    History is obtained from the patient, electronic health record and emergency department physician    History of Present Illness   Stacie Hair is a 66 year old female who reported she became sick with flulike symptoms about January 12.  Her  had tested positive at home for COVID-19 on January 10.  She has had fatigue, malaise, nausea, vomiting, and shortness of breath.  She denies much cough.  Her shortness of breath has gradually worsened and today appeared to be severe at home, so an ambulance was called.  Upon arrival of EMS, oxygen saturations were reportedly 52% in room air.  She was started on oxygen supplementation and transported to the ER by ambulance.  She remained hypoxic on oxygen supplementation in the ER including high flow nasal cannula oxygen supplementation, so BiPAP was started in the ER.  Oxygenation improved after starting BiPAP and the patient reported that she started to feel better after starting BiPAP.  Dr. Leal in the ER reports that the patient was given a dose of dexamethasone.  She was also started on IV heparin infusion for treatment of pulmonary  emboli diagnosed in the ER today.  Patient is now seen in her ICU room for admission.    Review of Systems    The 10 point Review of Systems is negative other than noted in the HPI or here.     Past Medical History    I have reviewed this patient's medical history and updated it with pertinent information if needed.   Past Medical History:   Diagnosis Date     Benign positional vertigo 3/2020     Herniated discs      Hypertension 2017     Migraines 1972-     Myalgia and myositis, unspecified     Fibromyalgia     Tinnitus      She says she has had pneumonia in the past as well as asthma.  She denies any history of blood clots.    She has no known history of ischemic heart disease.  Cardiac stress testing was negative for inducible ischemia in 2011.    Past Surgical History   I have reviewed this patient's surgical history and updated it with pertinent information if needed.  Past Surgical History:   Procedure Laterality Date     COLONOSCOPY  2011    Procedure:COMBINED COLONOSCOPY, SINGLE BIOPSY/POLYPECTOMY BY BIOPSY; Colonoscopy, with polypectomy by biopsy; Surgeon:MADELEINE MONROY; Location:[facility]     HC EXCISION BREAST LESION, OPEN >=1      benign, age 20     ZZC  DELIVERY ONLY      , Transfusion     ZZC LIGATE FALLOPIAN TUBE,POSTPARTUM      Tubal Ligation       Social History   I have reviewed this patient's social history and updated it with pertinent information if needed.  Social History     Tobacco Use     Smoking status: Former Smoker     Packs/day: 0.00     Years: 0.00     Pack years: 0.00     Smokeless tobacco: Never Used     Tobacco comment: -quit   Vaping Use     Vaping Use: Never used   Substance Use Topics     Alcohol use: Yes     Alcohol/week: 0.0 standard drinks     Comment: occasional     Drug use: No     She is  and lives at home with her .    Family History   I have reviewed this patient's family history and updated it with pertinent  information if needed.  Family History   Problem Relation Age of Onset     Hypertension Mother      Heart Disease Father         MI     She says she has a daughter who had an episode of Guillain-Barré syndrome for which she ultimately required tracheostomy for ongoing assisted ventilation and that her daughter recovered over time.    Prior to Admission Medications   Prior to Admission Medications   Prescriptions Last Dose Informant Patient Reported? Taking?   Calcium Carb-Cholecalciferol (CALCIUM + D3 PO)   Yes No   Patient not taking: Reported on 2022   Cyanocobalamin (VITAMIN B-12 PO)   Yes No   Patient not taking: Reported on 2022   Magnesium 80 MG TABS   Yes No   Patient not taking: Reported on 2022   NEW MED   Yes No   Sig: VitaPulse   Patient not taking: Reported on 2022   Omega-3 Fatty Acids (OMEGA 3 PO)   Yes No   Sig: Take by mouth daily   Patient not taking: Reported on 2022   UNABLE TO FIND   Yes No   Si,515 mg MEDICATION NAME: Gabriel   Patient not taking: Reported on 2022   albuterol (PROAIR HFA/PROVENTIL HFA/VENTOLIN HFA) 108 (90 Base) MCG/ACT inhaler 2022 at Unknown time  No Yes   Sig: Inhale 2 puffs into the lungs every 6 hours   cyclobenzaprine (FLEXERIL) 10 MG tablet Past Week at Unknown time  No Yes   Sig: Take 0.5-1 tablets (5-10 mg) by mouth 3 times daily as needed for muscle spasms   hydrochlorothiazide (HYDRODIURIL) 25 MG tablet Past Week at Unknown time  No Yes   Sig: TAKE ONE TABLET BY MOUTH ONCE DAILY   methylPREDNISolone (MEDROL DOSEPAK) 4 MG tablet therapy pack Past Week at Unknown time  No Yes   Sig: Follow package instructions   oxazepam (SERAX) 10 MG capsule Past Week at Unknown time  No Yes   Sig: TAKE 1 CAPSULE BY MOUTH TWO TIMES A DAY AS NEEDED FOR ANXIETY (VERTIGO)      Facility-Administered Medications: None     Allergies   Allergies   Allergen Reactions     Codeine      Eggs [Eggs]      Milk Products      Nsaids      Intolerance        Physical Exam   Vital Signs: Temp: 98.3  F (36.8  C) Temp src: Axillary BP: 119/80 Pulse: 81   Resp: (!) 32 SpO2: 92 % (Simultaneous filing. User may not have seen previous data.) O2 Device: BiPAP/CPAP (Simultaneous filing. User may not have seen previous data.) Oxygen Delivery: 15 LPM  Weight: 177 lbs 0 oz Body mass index is 32.37 kg/m .    Wt Readings from Last 4 Encounters:   01/26/22 80.3 kg (177 lb)   10/16/21 77.1 kg (170 lb)   04/20/21 76.7 kg (169 lb)   03/01/21 77.3 kg (170 lb 8 oz)     General Appearance: Ill-appearing elderly woman, mildly obese with BMI 32, tachypneic but able to speak short phrases and sentences while on BiPAP  Eyes: Anicteric sclerae, clear conjunctivae, symmetric pupils normal size  HEENT: No signs of recent head trauma, clear nares and ear canals, lips are dry, oropharynx not well seen while on BiPAP  Neck: Trachea midline, no stridor, symmetric  Chest: No gross anomalies, symmetric excursion  Respiratory: Normal respiratory effort, few inspiratory crackles right greater than left, no wheezes  Cardiovascular: Regular rate and rhythm, good radial pulse, brisk capillary refill, no peripheral edema  GI: Normal bowel sounds, nondistended abdomen, mildly obese, soft, nontender  Lymph/Hematologic: No cervical or supraclavicular lymphadenopathy  Skin: Normal color, no rash  Musculoskeletal: No cords or tenderness in the lower extremities  Neurologic: Alert and appears to maintain wakefulness and attention, communication limited by active BiPAP treatment but she appears to be responding appropriately to questions, able to follow simple instructions, no involuntary movements  Psychiatric: Normal mood and affect for the circumstances    Data   Data reviewed today: I reviewed all medications, new labs and imaging results over the last 24 hours. I personally reviewed the EKG tracing showing Borderline sinus tachycardia with left bundle branch block.  Left bundle branch block is  chronic..    Recent Labs   Lab 01/26/22  1059 01/26/22  1058   WBC 13.0*  --    HGB 14.2  --    MCV 89  --      --    INR  --  1.09   NA  --  131*   POTASSIUM  --  3.0*   CHLORIDE  --  95   CO2  --  27   BUN  --  26   CR  --  0.67   ANIONGAP  --  9   FATUMA  --  8.5   GLC  --  134*   ALBUMIN  --  2.1*   PROTTOTAL  --  8.0   BILITOTAL  --  1.2   ALKPHOS  --  142   ALT  --  58*   AST  --  60*     Lactic acid 2.0    Troponin 11 which is normal    Recent Labs   Lab 01/26/22  1234 01/26/22  1059   PH 7.40  --    PCO2 25*  --    PO2 121*  --    HCO3 25  --    O2PER 80 100     PaO2 to FiO2 ratio 151, patient was on BiPAP at the time    Recent Results (from the past 24 hour(s))   XR Chest Port 1 View    Narrative    CHEST PORTABLE ONE VIEW January 26, 2022 11:20 AM     HISTORY: Shortness of breath.    COMPARISON: Chest x-rays and CT dated 10/16/2021.    FINDINGS: Extensive bilateral pulmonary infiltrates (worse on the  right) are noted and are new since the prior studies from 2021.  Cardiac silhouette remains mildly enlarged. No pneumothorax or  significant pleural fluid collection.      Impression    IMPRESSION:  1. Bilateral pulmonary infiltrates could represent pulmonary edema or  infectious process such as COVID-19 pneumonia. Recommend clinical  correlation.  2. Mild cardiomegaly again noted.    I discussed the severe bilateral pulmonary infiltrates with Dr. Leal  on 1/26/2022 at 11:25 AM.   CT Chest Pulmonary Embolism w Contrast   Result Value    Radiologist flags Pulmonary embolism (AA)    Narrative    CT CHEST PULMONARY EMBOLISM W CONTRAST 1/26/2022 12:34 PM    CLINICAL HISTORY: PE suspected, low/intermediate prob, positive  D-dimer. Shortness of breath. COVID19 and pneumonia.  TECHNIQUE: CT angiogram chest during arterial phase injection IV  contrast. 2D and 3D MIP reconstructions were performed by the CT  technologist. Dose reduction techniques were used.     CONTRAST: 70mL, Isovue 370    COMPARISON:  10/16/2021    FINDINGS:  ANGIOGRAM CHEST: Small filling defects in right lower lobe, right  middle lobe and left upper lobe segmental and subsegmental pulmonary  arteries, consistent with pulmonary emboli. Thoracic aorta is negative  for dissection. No CT evidence of right heart strain.    LUNGS AND PLEURA: Severe bilateral groundglass and patchy infiltrates  consistent with pneumonia. No pleural effusion.    MEDIASTINUM/AXILLAE: New mild hilar and mediastinal lymphadenopathy.  For example, there is a 2.1 cm precarinal lymph node (series 4, image  45) and a 2.0 cm right infrahilar lymph node (series 4, image 55). No  thoracic aortic aneurysm. No coronary artery calcifications. No  pericardial effusion.    UPPER ABDOMEN: Hepatic steatosis. Mild hypodense thickening of the  adrenal glands bilaterally without a focal mass, new or more prominent  since 10/16/2021.    MUSCULOSKELETAL: No concerning bone lesions.      Impression    IMPRESSION:  1.  Positive for bilateral segmental and subsegmental pulmonary  emboli.  2.  Severe bilateral infiltrates consistent with COVID 19 pneumonia.  3.  New mild mediastinal and hilar lymphadenopathy is nonspecific,  likely reactive.  4.  Stable hepatic steatosis.  5.  New mild hypodense thickening of the adrenal glands bilaterally  without a focal mass, of uncertain clinical significance. A follow-up  CT in 6 months can be considered to assess for changes, if clinically  indicated.    [Critical Result: Pulmonary embolism]    Finding was identified on 1/26/2022 1:04 PM.     Dr. Leal was contacted by me on 1/26/2022 1:17 PM and verbalized  understanding of the critical result.      WAN OSWALD MD         SYSTEM ID:  [id number]

## 2022-01-26 NOTE — ED TRIAGE NOTES
Short of breath, cough for 12 days.  Sats of 52% on room air when police arrived at home.  Arrived on 15L non rebreather masks with sats in low 80s.  Transferred to room 6 placed on high flow NC briefly and switched to bipap by RT.  has covid and patient started having symptoms 2 days after him, has not tested yet.     Triage incomplete

## 2022-01-26 NOTE — PROGRESS NOTES
01/26/22 1615   CPAP/BiPAP/Settings   IPAP/EPAP (cmH2O) 18/10   Rate (breaths/min) 16   Oxygen (%) 80   Timed Inspiration (sec) 1   IPAP RISE  Settings (V60) 1   CPAP/BiPAP Patient Parameters   IPAP (cm H2O) 18 cmH2O   EPAP (cm H2O) 10 cmH2O   Pressure Support (cm H2O) 14 cmH2O   RR Total (breaths/min) 35 breaths/min   Vt (mL) 364 mL   Minute Ventilation (L/min) 16.2 L/min   Peak Inspiratory Pressure (cm H2O) 19 cmH2O   Pt.  Leak (L/min) 61 L/min   CPAP/BiPAP/AVAPS/AVAPS AE Alarms   High Pressure (cm H2O) 25 cmH2O   Low Pressure (cm H2O) 10   Lo Min Vent 2   High Rate (breaths/min) 60 breaths/min   Low Rate (breaths/min) 10   CPAP/BiPAP/AVAPS/AVAPS AE Patient Assessment   Interface Face Mask - Medium   Skin Integrity intact   RT Device Skin Assessment   Oxygen Delivery Device CPAP/BiPAP Mask   Site Appearance neck circumference Clean and dry   Site Appearance bridge of nose Clean and dry   Site Appearance occiput Clean and dry   Strap Tightness Finger Allowance between head and device strap   Skin Interventions Taken Skin barrier applied   Respiratory WDL   Respiratory WDL X   Ambu at Bedside present   Trachea Assessment Midline   Breath Sounds   Breath Sounds All Fields;FRANCISCO J;LLL;RUL;RML;RLL   FRANCISCO J Breath Sounds clear   LLL Breath Sounds crackles   RUL Breath Sounds clear   RML Breath Sounds crackles   RLL Breath Sounds crackles     Patient transported to ICU while on BIPAP with no complications

## 2022-01-26 NOTE — ED PROVIDER NOTES
History     Chief Complaint   Patient presents with     Shortness of Breath     HPI  Stacie Hair is a 66 year old female who presents by ambulance for respiratory distress.  Apparently hypoxic when paramedics arrived with her sats at 52%.  Arrived on 15 L rebreather mask with sats in the low 80s.  Patient states she is been sick for about 2 weeks.  Symptoms began on the 12th and have not improved and actually worsened in the last 24 hours.  Inhaler at home without improvement.   apparently was diagnosed with Covid and actually hospitalized.  Patient not received Covid or influenza immunizations.  She has had a history of pneumonia.  Patient is a former smoker.  She has had fevers throughout but denies today.  Her taste and smell are not completely gone but not normal.  She has a nonproductive cough.  Denies chest pain.  She has no abdominal pain, nausea or vomiting.  Has had no diarrhea.  No leg pain or swelling.  No history of DVT or PE.    Allergies:  Allergies   Allergen Reactions     Codeine      Eggs [Eggs]      Milk Products      Nsaids      Intolerance       Problem List:    Patient Active Problem List    Diagnosis Date Noted     Benign positional vertigo 03/2020     Priority: Medium     Hyperlipidemia LDL goal <130 09/08/2018     Priority: Medium     Hypertension 2017     Priority: Medium     Family history of coronary artery disease 07/17/2011     Priority: Medium     Female stress incontinence 10/24/2002     Priority: Medium     Migraines 1972     Priority: Medium        Past Medical History:    Past Medical History:   Diagnosis Date     Benign positional vertigo 3/2020     Herniated discs      Hypertension 2017     Migraines 1972-     Myalgia and myositis, unspecified      Tinnitus 2013       Past Surgical History:    Past Surgical History:   Procedure Laterality Date     COLONOSCOPY  9/26/2011    Procedure:COMBINED COLONOSCOPY, SINGLE BIOPSY/POLYPECTOMY BY BIOPSY; Colonoscopy, with  "polypectomy by biopsy; Surgeon:MADELEINE MONROY; Location:[facility]     HC EXCISION BREAST LESION, OPEN >=1      benign, age 20     ZZC  DELIVERY ONLY      , Transfusion     ZZC LIGATE FALLOPIAN TUBE,POSTPARTUM      Tubal Ligation       Family History:    Family History   Problem Relation Age of Onset     Hypertension Mother      Heart Disease Father         MI       Social History:  Marital Status:   [2]  Social History     Tobacco Use     Smoking status: Former Smoker     Packs/day: 0.00     Years: 0.00     Pack years: 0.00     Smokeless tobacco: Never Used     Tobacco comment: -quit   Vaping Use     Vaping Use: Never used   Substance Use Topics     Alcohol use: Yes     Alcohol/week: 0.0 standard drinks     Comment: occasional     Drug use: No        Medications:    albuterol (PROAIR HFA/PROVENTIL HFA/VENTOLIN HFA) 108 (90 Base) MCG/ACT inhaler  cyclobenzaprine (FLEXERIL) 10 MG tablet  hydrochlorothiazide (HYDRODIURIL) 25 MG tablet  methylPREDNISolone (MEDROL DOSEPAK) 4 MG tablet therapy pack  oxazepam (SERAX) 10 MG capsule  Calcium Carb-Cholecalciferol (CALCIUM + D3 PO)  Cyanocobalamin (VITAMIN B-12 PO)  Magnesium 80 MG TABS  NEW MED  Omega-3 Fatty Acids (OMEGA 3 PO)  UNABLE TO FIND          Review of Systems all other systems are reviewed and are negative.    Physical Exam   BP: (!) 138/91  Pulse: 111  Temp: 98.1  F (36.7  C)  Resp: (!) 45  Height: 165.1 cm (5' 5\")  Weight: 79.4 kg (175 lb)  SpO2: (!) 83 %      Physical Exam General alert female in moderate respiratory distress.  HEENT reveals nasal mucosal swelling.  Speaks in short sentences.  Has no neck stridor.  Poor air movement with basilar crackles.  Cardiac auscultation reveals tachycardia but seems regular.  Abdomen is obese but benign.  Her legs are not swollen. She has no tenderness in the calf or thigh and her Homans is negative.    ED Course                 Procedures              EKG Interpretation:  "     Interpreted by Ciro Leal MD  Time reviewed: 11:10  Symptoms at time of EKG: SOB   Rhythm: normal sinus   Rate: Tachycardia  Axis: Left Axis Deviation  Ectopy: none  Conduction: left bundle branch block (complete)  ST Segments/ T Waves: Non-specific ST-T wave changes  Q Waves: none  Comparison to prior: Unchanged from 10/21    Clinical Impression: left bundle branch block                Critical Care time:  was 35 minutes for this patient excluding procedures.               Results for orders placed or performed during the hospital encounter of 01/26/22 (from the past 24 hour(s))   D dimer quantitative   Result Value Ref Range    D-Dimer Quantitative >20.00 (HH) 0.00 - 0.50 ug/mL FEU    Narrative    This D-dimer assay is intended for use in conjunction with a clinical pretest probability assessment model to exclude pulmonary embolism (PE) and deep venous thrombosis (DVT) in outpatients suspected of PE or DVT. The cut-off value is 0.50 ug/mL FEU.   INR   Result Value Ref Range    INR 1.09 0.85 - 1.15   Comprehensive metabolic panel   Result Value Ref Range    Sodium 131 (L) 133 - 144 mmol/L    Potassium 3.0 (L) 3.4 - 5.3 mmol/L    Chloride 95 94 - 109 mmol/L    Carbon Dioxide (CO2) 27 20 - 32 mmol/L    Anion Gap 9 3 - 14 mmol/L    Urea Nitrogen 26 7 - 30 mg/dL    Creatinine 0.67 0.52 - 1.04 mg/dL    Calcium 8.5 8.5 - 10.1 mg/dL    Glucose 134 (H) 70 - 99 mg/dL    Alkaline Phosphatase 142 40 - 150 U/L    AST 60 (H) 0 - 45 U/L    ALT 58 (H) 0 - 50 U/L    Protein Total 8.0 6.8 - 8.8 g/dL    Albumin 2.1 (L) 3.4 - 5.0 g/dL    Bilirubin Total 1.2 0.2 - 1.3 mg/dL    GFR Estimate >90 >60 mL/min/1.73m2   Troponin I   Result Value Ref Range    Troponin I High Sensitivity 11 <54 ng/L   CRP inflammation   Result Value Ref Range    CRP Inflammation 418.0 (H) 0.0 - 8.0 mg/L   CBC with platelets differential    Narrative    The following orders were created for panel order CBC with platelets differential.  Procedure                                Abnormality         Status                     ---------                               -----------         ------                     CBC with platelets and d...[[id number]]  Abnormal            Final result                 Please view results for these tests on the individual orders.   Lactic acid whole blood   Result Value Ref Range    Lactic Acid 2.0 0.7 - 2.0 mmol/L   Blood gas venous   Result Value Ref Range    pH Venous 7.50 (H) 7.32 - 7.43    pCO2 Venous 37 (L) 40 - 50 mm Hg    pO2 Venous 39 25 - 47 mm Hg    Bicarbonate Venous 29 (H) 21 - 28 mmol/L    Base Excess/Deficit (+/-) 5.2 (H) -7.7 - 1.9 mmol/L    FIO2 100    CBC with platelets and differential   Result Value Ref Range    WBC Count 13.0 (H) 4.0 - 11.0 10e3/uL    RBC Count 4.70 3.80 - 5.20 10e6/uL    Hemoglobin 14.2 11.7 - 15.7 g/dL    Hematocrit 41.8 35.0 - 47.0 %    MCV 89 78 - 100 fL    MCH 30.2 26.5 - 33.0 pg    MCHC 34.0 31.5 - 36.5 g/dL    RDW 13.5 10.0 - 15.0 %    Platelet Count 262 150 - 450 10e3/uL    % Neutrophils 89 %    % Lymphocytes 5 %    % Monocytes 2 %    % Eosinophils 0 %    % Basophils 0 %    % Immature Granulocytes 4 %    NRBCs per 100 WBC 0 <1 /100    Absolute Neutrophils 11.4 (H) 1.6 - 8.3 10e3/uL    Absolute Lymphocytes 0.7 (L) 0.8 - 5.3 10e3/uL    Absolute Monocytes 0.3 0.0 - 1.3 10e3/uL    Absolute Eosinophils 0.0 0.0 - 0.7 10e3/uL    Absolute Basophils 0.0 0.0 - 0.2 10e3/uL    Absolute Immature Granulocytes 0.5 (H) <=0.4 10e3/uL    Absolute NRBCs 0.0 10e3/uL   [place] Draw    Narrative    The following orders were created for panel order [place] Draw.  Procedure                               Abnormality         Status                     ---------                               -----------         ------                     Extra Blood Culture Bottle[[id number]]                       Final result                 Please view results for these tests on the individual orders.   Extra Blood Culture Bottle    Result Value Ref Range    Hold Specimen JIC    Symptomatic; Unknown Influenza A/B & SARS-CoV2 (COVID-19) Virus PCR Multiplex Nasopharyngeal    Specimen: Nasopharyngeal; Swab   Result Value Ref Range    Influenza A PCR Negative Negative    Influenza B PCR Negative Negative    SARS CoV2 PCR Positive (A) Negative    Narrative    Testing was performed using the mabel SARS-CoV-2 & Influenza A/B Assay on the mabel Monisha System. This test should be ordered for the detection of SARS-CoV-2 and influenza viruses in individuals who meet clinical and/or epidemiological criteria. Test performance is unknown in asymptomatic patients. This test is for in vitro diagnostic use under the FDA EUA for laboratories certified under CLIA to perform moderate and/or high complexity testing. This test has not been FDA cleared or approved. A negative result does not rule out the presence of PCR inhibitors in the specimen or target RNA in concentration below the limit of detection for the assay. If only one viral target is positive but coinfection with multiple targets is suspected, the sample should be re-tested with another FDA cleared, approved or authorized test, if coinfection would change clinical management. [facility] are certified under the Clinical Laboratory Improvement Amendments of 1988 (CLIA-88) as  qualified to perform moderate and/or high complexity laboratory testing.   Extra Tube    Narrative    The following orders were created for panel order Extra Tube.  Procedure                               Abnormality         Status                     ---------                               -----------         ------                     Extra Red Top Tube[[id number]]                               Final result                 Please view results for these tests on the individual orders.   Extra Red Top Tube   Result Value Ref Range    Hold Specimen JIC    XR Chest Port 1 View    Narrative    CHEST PORTABLE ONE VIEW  January 26, 2022 11:20 AM     HISTORY: Shortness of breath.    COMPARISON: Chest x-rays and CT dated 10/16/2021.    FINDINGS: Extensive bilateral pulmonary infiltrates (worse on the  right) are noted and are new since the prior studies from 2021.  Cardiac silhouette remains mildly enlarged. No pneumothorax or  significant pleural fluid collection.      Impression    IMPRESSION:  1. Bilateral pulmonary infiltrates could represent pulmonary edema or  infectious process such as COVID-19 pneumonia. Recommend clinical  correlation.  2. Mild cardiomegaly again noted.    I discussed the severe bilateral pulmonary infiltrates with Dr. Leal  on 1/26/2022 at 11:25 AM.   Blood gas arterial with oxyhemoglobin   Result Value Ref Range    pH Arterial 7.40 7.35 - 7.45    pCO2 Arterial 25 (L) 35 - 45 mm Hg    pO2 Arterial 121 (H) 80 - 105 mm Hg    Bicarbonate Arterial 25 21 - 28 mmol/L    Oxyhemoglobin Arterial 96 92 - 100 %    Base Excess/Deficit (+/-) 4.9 (H) -9.0 - 1.8 mmol/L    FIO2 80    CT Chest Pulmonary Embolism w Contrast   Result Value Ref Range    Radiologist flags Pulmonary embolism (AA)     Narrative    CT CHEST PULMONARY EMBOLISM W CONTRAST 1/26/2022 12:34 PM    CLINICAL HISTORY: PE suspected, low/intermediate prob, positive  D-dimer. Shortness of breath. COVID19 and pneumonia.  TECHNIQUE: CT angiogram chest during arterial phase injection IV  contrast. 2D and 3D MIP reconstructions were performed by the CT  technologist. Dose reduction techniques were used.     CONTRAST: 70mL, Isovue 370    COMPARISON: 10/16/2021    FINDINGS:  ANGIOGRAM CHEST: Small filling defects in right lower lobe, right  middle lobe and left upper lobe segmental and subsegmental pulmonary  arteries, consistent with pulmonary emboli. Thoracic aorta is negative  for dissection. No CT evidence of right heart strain.    LUNGS AND PLEURA: Severe bilateral groundglass and patchy infiltrates  consistent with pneumonia. No pleural  effusion.    MEDIASTINUM/AXILLAE: New mild hilar and mediastinal lymphadenopathy.  For example, there is a 2.1 cm precarinal lymph node (series 4, image  45) and a 2.0 cm right infrahilar lymph node (series 4, image 55). No  thoracic aortic aneurysm. No coronary artery calcifications. No  pericardial effusion.    UPPER ABDOMEN: Hepatic steatosis. Mild hypodense thickening of the  adrenal glands bilaterally without a focal mass, new or more prominent  since 10/16/2021.    MUSCULOSKELETAL: No concerning bone lesions.      Impression    IMPRESSION:  1.  Positive for bilateral segmental and subsegmental pulmonary  emboli.  2.  Severe bilateral infiltrates consistent with COVID 19 pneumonia.  3.  New mild mediastinal and hilar lymphadenopathy is nonspecific,  likely reactive.  4.  Stable hepatic steatosis.  5.  New mild hypodense thickening of the adrenal glands bilaterally  without a focal mass, of uncertain clinical significance. A follow-up  CT in 6 months can be considered to assess for changes, if clinically  indicated.    [Critical Result: Pulmonary embolism]    Finding was identified on 1/26/2022 1:04 PM.     Dr. Leal was contacted by me on 1/26/2022 1:17 PM and verbalized  understanding of the critical result.      WAN OSWALD MD         SYSTEM ID:  [id number]       Medications   remdesivir 200 mg in sodium chloride 0.9 % 250 mL intermittent infusion (200 mg Intravenous Not Given 1/26/22 1236)     Followed by   0.9% sodium chloride BOLUS (50 mLs Intravenous Not Given 1/26/22 1236)   remdesivir (VEKURY) 100 mg in 0.9% NaCl 250 mL intermittent infusion SOLN 100 mg (has no administration in time range)     And   0.9% sodium chloride BOLUS (has no administration in time range)   albuterol (PROVENTIL) neb solution 2.5 mg (has no administration in time range)   heparin ANTICOAGULANT Loading dose for HIGH INTENSITY TREATMENT * Give BEFORE starting heparin infusion (has no administration in time range)   heparin 25,000  units in 0.45% NaCl 250 mL ANTICOAGULANT infusion (has no administration in time range)   dexamethasone PF (DECADRON) injection 6 mg (6 mg Intravenous Given 1/26/22 1121)   Saline Bag 100mL  CT  flush use only (70 mLs Intravenous Given 1/26/22 1210)   iopamidol (ISOVUE-370) solution 500 mL (70 mLs Intravenous Given 1/26/22 1210)     IV is established and patient is given Decadron.  Blood work is ordered, chest x-ray is ordered, despite high flow nasal cannula O2 sats maintained the 80s so she was placed on BiPAP with marked improvement.  EKG.  Chest x-ray showed extensive bilateral infiltrates.  D-dimer was greater than 20 so a CT is ordered to further assess for PE.  With a diagnosis of COVID-19 she is given Decadron and remdesivir.  At 1135 nursing staff reports the patient refuses remdesivir.  Spoke to radiologist and she does have bilateral PEs without heart strain.  IV heparin high doses ordered.  Assessments & Plan (with Medical Decision Making)   Stacie Hair is a 66 year old female who presents by ambulance for respiratory distress.  Apparently hypoxic when paramedics arrived with her sats at 52%.  Arrived on 15 L rebreather mask with sats in the low 80s.  Patient states she is been sick for about 2 weeks.  Symptoms began on the 12th and have not improved and actually worsened in the last 24 hours.  Inhaler at home without improvement.   apparently was diagnosed with Covid and actually hospitalized.  Patient not received Covid or influenza immunizations.  She has had a history of pneumonia.  Patient is a former smoker.  She has had fevers throughout but denies today.  Her taste and smell are not completely gone but not normal.  She has a nonproductive cough.  Denies chest pain.  She has no abdominal pain, nausea or vomiting.  Has had no diarrhea.  No leg pain or swelling.  No history of DVT or PE.  On presentation patient was in moderate respiratory distress and was hypoxic.  High flow nasal  cannula did not improve her sats so BiPAP was initiated with marked improvement.  Chest x-ray unfortunately shows extensive bilateral infiltrates as above.  An elevated D-dimer and positive Covid test, CT of the chest PE protocol is ordered to further assess for pulmonary embolism versus Covid causing the symptoms.  She has both extensive Covid infiltrates and bilateral PEs.  IV heparin weight-based protocol was ordered.  She is given Decadron.  EKG shows left bundle branch is unchanged from previous and her troponin was normal.  Transaminases were mildly elevated so she still candidate for remdesivir but patient refused this.  Mildly elevated white count but a markedly elevated CRP at greater than 400.  Due to her oxygen demand  patient will be admitted.   I have reviewed the nursing notes.    I have reviewed the findings, diagnosis, plan and need for follow up with the patient.       New Prescriptions    No medications on file       Final diagnoses:   Pneumonia due to 2019 novel coronavirus   Hypoxia   Multiple subsegmental pulmonary emboli without acute cor pulmonale (H)       1/26/2022   [facility] EMERGENCY DEPT     Ciro Leal MD  01/26/22 1313       Ciro Leal MD  01/26/22 1328

## 2022-01-27 ENCOUNTER — ANESTHESIA EVENT (OUTPATIENT)
Dept: INTENSIVE CARE | Facility: CLINIC | Age: 67
End: 2022-01-27
Payer: COMMERCIAL

## 2022-01-27 ENCOUNTER — ANESTHESIA (OUTPATIENT)
Dept: INTENSIVE CARE | Facility: CLINIC | Age: 67
End: 2022-01-27
Payer: COMMERCIAL

## 2022-01-27 ENCOUNTER — APPOINTMENT (OUTPATIENT)
Dept: CARDIOLOGY | Facility: CLINIC | Age: 67
End: 2022-01-27
Attending: PEDIATRICS
Payer: COMMERCIAL

## 2022-01-27 PROBLEM — R73.9 HYPERGLYCEMIA: Status: ACTIVE | Noted: 2022-01-27

## 2022-01-27 PROBLEM — E66.9 OBESITY: Status: ACTIVE | Noted: 2022-01-27

## 2022-01-27 LAB
ANION GAP SERPL CALCULATED.3IONS-SCNC: 7 MMOL/L (ref 3–14)
BASE EXCESS BLDA CALC-SCNC: 1.3 MMOL/L (ref -9–1.8)
BASE EXCESS BLDA CALC-SCNC: 2.6 MMOL/L (ref -9–1.8)
BASE EXCESS BLDA CALC-SCNC: 2.7 MMOL/L (ref -9–1.8)
BUN SERPL-MCNC: 37 MG/DL (ref 7–30)
CALCIUM SERPL-MCNC: 8.8 MG/DL (ref 8.5–10.1)
CHLORIDE BLD-SCNC: 104 MMOL/L (ref 94–109)
CO2 SERPL-SCNC: 25 MMOL/L (ref 20–32)
CREAT SERPL-MCNC: 0.73 MG/DL (ref 0.52–1.04)
CRP SERPL-MCNC: 352 MG/L (ref 0–8)
ERYTHROCYTE [DISTWIDTH] IN BLOOD BY AUTOMATED COUNT: 13.1 % (ref 10–15)
GFR SERPL CREATININE-BSD FRML MDRD: >90 ML/MIN/1.73M2
GLUCOSE BLD-MCNC: 159 MG/DL (ref 70–99)
GLUCOSE BLDC GLUCOMTR-MCNC: 153 MG/DL (ref 70–99)
GLUCOSE BLDC GLUCOMTR-MCNC: 157 MG/DL (ref 70–99)
GLUCOSE BLDC GLUCOMTR-MCNC: 158 MG/DL (ref 70–99)
GLUCOSE BLDC GLUCOMTR-MCNC: 159 MG/DL (ref 70–99)
GLUCOSE BLDC GLUCOMTR-MCNC: 161 MG/DL (ref 70–99)
GLUCOSE BLDC GLUCOMTR-MCNC: 163 MG/DL (ref 70–99)
GLUCOSE BLDC GLUCOMTR-MCNC: 167 MG/DL (ref 70–99)
HBA1C MFR BLD: 6.5 % (ref 0–5.6)
HCO3 BLD-SCNC: 25 MMOL/L (ref 21–28)
HCO3 BLD-SCNC: 27 MMOL/L (ref 21–28)
HCO3 BLD-SCNC: 27 MMOL/L (ref 21–28)
HCT VFR BLD AUTO: 35.6 % (ref 35–47)
HGB BLD-MCNC: 13.3 G/DL (ref 11.7–15.7)
LDH SERPL L TO P-CCNC: 501 U/L (ref 81–234)
LVEF ECHO: NORMAL
MCH RBC QN AUTO: 29.8 PG (ref 26.5–33)
MCHC RBC AUTO-ENTMCNC: 37.4 G/DL (ref 31.5–36.5)
MCV RBC AUTO: 80 FL (ref 78–100)
O2/TOTAL GAS SETTING VFR VENT: 60 %
O2/TOTAL GAS SETTING VFR VENT: 60 %
O2/TOTAL GAS SETTING VFR VENT: 75 %
OXYHGB MFR BLD: 96 % (ref 92–100)
PCO2 BLD: 37 MM HG (ref 35–45)
PCO2 BLD: 38 MM HG (ref 35–45)
PCO2 BLD: 42 MM HG (ref 35–45)
PH BLD: 7.42 [PH] (ref 7.35–7.45)
PH BLD: 7.45 [PH] (ref 7.35–7.45)
PH BLD: 7.46 [PH] (ref 7.35–7.45)
PLATELET # BLD AUTO: 225 10E3/UL (ref 150–450)
PO2 BLD: 102 MM HG (ref 80–105)
PO2 BLD: 65 MM HG (ref 80–105)
PO2 BLD: 97 MM HG (ref 80–105)
POTASSIUM BLD-SCNC: 4.4 MMOL/L (ref 3.4–5.3)
RBC # BLD AUTO: 4.46 10E6/UL (ref 3.8–5.2)
SODIUM SERPL-SCNC: 136 MMOL/L (ref 133–144)
TROPONIN I SERPL HS-MCNC: 7 NG/L
UFH PPP CHRO-ACNC: 0.53 IU/ML
WBC # BLD AUTO: 13 10E3/UL (ref 4–11)

## 2022-01-27 PROCEDURE — 99291 CRITICAL CARE FIRST HOUR: CPT | Performed by: PEDIATRICS

## 2022-01-27 PROCEDURE — 370N000003 HC ANESTHESIA WARD SERVICE

## 2022-01-27 PROCEDURE — 83036 HEMOGLOBIN GLYCOSYLATED A1C: CPT | Performed by: PEDIATRICS

## 2022-01-27 PROCEDURE — 84484 ASSAY OF TROPONIN QUANT: CPT | Performed by: PEDIATRICS

## 2022-01-27 PROCEDURE — 85027 COMPLETE CBC AUTOMATED: CPT | Performed by: PEDIATRICS

## 2022-01-27 PROCEDURE — 255N000002 HC RX 255 OP 636: Performed by: INTERNAL MEDICINE

## 2022-01-27 PROCEDURE — 86140 C-REACTIVE PROTEIN: CPT | Performed by: PEDIATRICS

## 2022-01-27 PROCEDURE — 999N000208 ECHOCARDIOGRAM COMPLETE

## 2022-01-27 PROCEDURE — 80048 BASIC METABOLIC PNL TOTAL CA: CPT | Performed by: PEDIATRICS

## 2022-01-27 PROCEDURE — XW033H5 INTRODUCTION OF TOCILIZUMAB INTO PERIPHERAL VEIN, PERCUTANEOUS APPROACH, NEW TECHNOLOGY GROUP 5: ICD-10-PCS | Performed by: FAMILY MEDICINE

## 2022-01-27 PROCEDURE — 94660 CPAP INITIATION&MGMT: CPT

## 2022-01-27 PROCEDURE — 82803 BLOOD GASES ANY COMBINATION: CPT | Performed by: ANESTHESIOLOGY

## 2022-01-27 PROCEDURE — 999N000123 HC STATISTIC OXYGEN O2DAILY TECH TIME

## 2022-01-27 PROCEDURE — 36415 COLL VENOUS BLD VENIPUNCTURE: CPT | Performed by: PEDIATRICS

## 2022-01-27 PROCEDURE — 5A09457 ASSISTANCE WITH RESPIRATORY VENTILATION, 24-96 CONSECUTIVE HOURS, CONTINUOUS POSITIVE AIRWAY PRESSURE: ICD-10-PCS | Performed by: FAMILY MEDICINE

## 2022-01-27 PROCEDURE — 250N000009 HC RX 250: Performed by: PEDIATRICS

## 2022-01-27 PROCEDURE — 85520 HEPARIN ASSAY: CPT | Performed by: PEDIATRICS

## 2022-01-27 PROCEDURE — 82805 BLOOD GASES W/O2 SATURATION: CPT | Performed by: ANESTHESIOLOGY

## 2022-01-27 PROCEDURE — 999N000157 HC STATISTIC RCP TIME EA 10 MIN

## 2022-01-27 PROCEDURE — 200N000001 HC R&B ICU

## 2022-01-27 PROCEDURE — 258N000003 HC RX IP 258 OP 636: Performed by: PEDIATRICS

## 2022-01-27 PROCEDURE — 250N000011 HC RX IP 250 OP 636: Performed by: PEDIATRICS

## 2022-01-27 PROCEDURE — 250N000012 HC RX MED GY IP 250 OP 636 PS 637: Performed by: PEDIATRICS

## 2022-01-27 PROCEDURE — 83615 LACTATE (LD) (LDH) ENZYME: CPT | Performed by: PEDIATRICS

## 2022-01-27 PROCEDURE — 93306 TTE W/DOPPLER COMPLETE: CPT | Mod: 26 | Performed by: INTERNAL MEDICINE

## 2022-01-27 PROCEDURE — 82803 BLOOD GASES ANY COMBINATION: CPT | Performed by: PEDIATRICS

## 2022-01-27 PROCEDURE — 36600 WITHDRAWAL OF ARTERIAL BLOOD: CPT

## 2022-01-27 PROCEDURE — 999N000156 HC STATISTIC RCP CONSULT EA 30 MIN

## 2022-01-27 RX ADMIN — DEXMEDETOMIDINE HYDROCHLORIDE 0.2 MCG/KG/HR: 4 INJECTION, SOLUTION INTRAVENOUS at 02:47

## 2022-01-27 RX ADMIN — POTASSIUM CHLORIDE 10 MEQ: 7.46 INJECTION, SOLUTION INTRAVENOUS at 01:34

## 2022-01-27 RX ADMIN — TOCILIZUMAB 640 MG: 20 INJECTION, SOLUTION, CONCENTRATE INTRAVENOUS at 11:27

## 2022-01-27 RX ADMIN — INSULIN ASPART 1 UNITS: 100 INJECTION, SOLUTION INTRAVENOUS; SUBCUTANEOUS at 16:21

## 2022-01-27 RX ADMIN — INSULIN ASPART 1 UNITS: 100 INJECTION, SOLUTION INTRAVENOUS; SUBCUTANEOUS at 23:17

## 2022-01-27 RX ADMIN — DEXMEDETOMIDINE HYDROCHLORIDE 0.3 MCG/KG/HR: 4 INJECTION, SOLUTION INTRAVENOUS at 15:52

## 2022-01-27 RX ADMIN — INSULIN ASPART 1 UNITS: 100 INJECTION, SOLUTION INTRAVENOUS; SUBCUTANEOUS at 11:46

## 2022-01-27 RX ADMIN — HEPARIN SODIUM 1450 UNITS/HR: 10000 INJECTION, SOLUTION INTRAVENOUS at 00:16

## 2022-01-27 RX ADMIN — LORAZEPAM 0.5 MG: 2 INJECTION INTRAMUSCULAR; INTRAVENOUS at 15:14

## 2022-01-27 RX ADMIN — HEPARIN SODIUM 1450 UNITS/HR: 10000 INJECTION, SOLUTION INTRAVENOUS at 15:52

## 2022-01-27 RX ADMIN — POTASSIUM CHLORIDE 10 MEQ: 7.46 INJECTION, SOLUTION INTRAVENOUS at 00:31

## 2022-01-27 RX ADMIN — INSULIN ASPART 1 UNITS: 100 INJECTION, SOLUTION INTRAVENOUS; SUBCUTANEOUS at 19:52

## 2022-01-27 RX ADMIN — HUMAN ALBUMIN MICROSPHERES AND PERFLUTREN 9 ML: 10; .22 INJECTION, SOLUTION INTRAVENOUS at 10:43

## 2022-01-27 RX ADMIN — DEXAMETHASONE SODIUM PHOSPHATE 6 MG: 10 INJECTION INTRAMUSCULAR; INTRAVENOUS at 13:25

## 2022-01-27 RX ADMIN — LORAZEPAM 0.5 MG: 2 INJECTION INTRAMUSCULAR; INTRAVENOUS at 00:14

## 2022-01-27 RX ADMIN — LORAZEPAM 0.5 MG: 2 INJECTION INTRAMUSCULAR; INTRAVENOUS at 23:17

## 2022-01-27 RX ADMIN — POTASSIUM CHLORIDE 10 MEQ: 7.46 INJECTION, SOLUTION INTRAVENOUS at 02:33

## 2022-01-27 ASSESSMENT — ACTIVITIES OF DAILY LIVING (ADL)
ADLS_ACUITY_SCORE: 8
ADLS_ACUITY_SCORE: 6
ADLS_ACUITY_SCORE: 6
ADLS_ACUITY_SCORE: 8
ADLS_ACUITY_SCORE: 8
ADLS_ACUITY_SCORE: 6
ADLS_ACUITY_SCORE: 8
ADLS_ACUITY_SCORE: 6
ADLS_ACUITY_SCORE: 8
ADLS_ACUITY_SCORE: 6
ADLS_ACUITY_SCORE: 8
ADLS_ACUITY_SCORE: 6
ADLS_ACUITY_SCORE: 8
ADLS_ACUITY_SCORE: 6
ADLS_ACUITY_SCORE: 8

## 2022-01-27 ASSESSMENT — MIFFLIN-ST. JEOR: SCORE: 1290.23

## 2022-01-27 NOTE — ANESTHESIA CARE TRANSFER NOTE
Patient: Stacie Hair    Procedure: * No procedures listed *       Diagnosis: * No pre-op diagnosis entered *  Diagnosis Additional Information: No value filed.    Anesthesia Type:   No value filed.     Note:    Oropharynx: oropharynx clear of all foreign objects and spontaneously breathing  Level of Consciousness: awake      Independent Airway: airway patency satisfactory and stable  Dentition: dentition unchanged  Vital Signs Stable: post-procedure vital signs reviewed and stable  Report to RN Given: handoff report given  Patient transferred to: ICU    ICU Handoff: Call for PAUSE to initiate/utilize ICU HANDOFF, Identified Patient, Identified Responsible Provider, Reviewed the Pertinent Medical History, Discussed Surgical Course, Reviewed Intra-OP Anesthesia Management and Issues during Anesthesia, Set Expectations for Post Procedure Period and Allowed Opportunity for Questions and Acknowledgement of Understanding      Vitals:  Vitals Value Taken Time   BP     Temp     Pulse 50 01/27/22 1131   Resp 23 01/27/22 1131   SpO2 95 % 01/27/22 1131   Vitals shown include unvalidated device data.    Electronically Signed By: RUPA Crow CRNA  January 27, 2022  11:33 AM

## 2022-01-27 NOTE — PROGRESS NOTES
01/27/22 0738 01/27/22 0747   CPAP/BiPAP/Settings   $BIPAP/CPAP Therapy continuous  --    IPAP/EPAP (cmH2O) 18/10 22/10   Rate (breaths/min) 16 16   Oxygen (%) 70 70   Timed Inspiration (sec) 1 1   IPAP RISE  Settings (V60) 1 1   CPAP/BiPAP Patient Parameters   IPAP (cm H2O) 18 cmH2O 22 cmH2O   EPAP (cm H2O) 10 cmH2O 10 cmH2O   Pressure Support (cm H2O) 14 cmH2O 16 cmH2O   RR Total (breaths/min) 33 breaths/min 26 breaths/min   Vt (mL) 220 mL 253 mL   Minute Ventilation (L/min) 6.1 L/min 6.5 L/min   Peak Inspiratory Pressure (cm H2O) 19 cmH2O 23 cmH2O   Pt.  Leak (L/min) 71 L/min 60 L/min     Increased Inspiratory pressure to 22 from 18 do to low tidal volumes,  Only resulted in slight increase to tidal volumes now 250-260   Respiratory rate decreased from 33 bpm to 26 with increased pressure

## 2022-01-27 NOTE — PROGRESS NOTES
01/27/22 1630   CPAP/BiPAP/Settings   IPAP/EPAP (cmH2O) 22/14   Rate (breaths/min) 16   Oxygen (%) 60   Timed Inspiration (sec) 1   IPAP RISE  Settings (V60) 1   CPAP/BiPAP Patient Parameters   IPAP (cm H2O) 22 cmH2O   EPAP (cm H2O) 14 cmH2O   Pressure Support (cm H2O) 18 cmH2O   RR Total (breaths/min) 28 breaths/min   Vt (mL) 480 mL   Minute Ventilation (L/min) 15.9 L/min   Peak Inspiratory Pressure (cm H2O) 22 cmH2O   Pt.  Leak (L/min) 51 L/min   CPAP/BiPAP/AVAPS/AVAPS AE Alarms   High Pressure (cm H2O) 25 cmH2O   Low Pressure (cm H2O) 10   Lo Min Vent 2   High Rate (breaths/min) 60 breaths/min   Low Rate (breaths/min) 10   CPAP/BiPAP/AVAPS/AVAPS AE Patient Assessment   Interface Other (see comments)  (full face mask)   Skin Integrity intact   RT Device Skin Assessment   Oxygen Delivery Device CPAP/BiPAP Mask   Site Appearance neck circumference Clean and dry   Site Appearance bridge of nose Clean and dry   Site Appearance occiput Clean and dry   Strap Tightness Finger Allowance between head and device strap   Skin Interventions Taken No adjustments needed   Respiratory WDL   Respiratory WDL X   Rhythm/Pattern, Respiratory dyspnea on exertion   Ambu at Bedside present   Trachea Assessment Midline   Breath Sounds   Breath Sounds All Fields   All Lung Fields Breath Sounds diminished     Patient to stay on BIPAP continuously  Wean oxygen as able for SpO2 over night

## 2022-01-27 NOTE — PLAN OF CARE
Problem: Gas Exchange Impaired  Goal: Optimal Gas Exchange  Outcome: No Change  Intervention: Optimize Oxygenation and Ventilation  Recent Flowsheet Documentation  Taken 1/27/2022 1630 by Elizabeth Reid RN  Head of Bed (HOB) Positioning: HOB at 30 degrees    BiPAP FiO2 60%, tachypneic RR 28-32, lungs diminished  Monitor shows sinus bradycardia, HR 48-58, BP stable  Oriented X4, restless at times, precedex gtt infusing  Skin pale, warm and dry

## 2022-01-27 NOTE — ANESTHESIA PROCEDURE NOTES
Arterial Line Procedure Note    Pre-Procedure   Staff -        CRNA: Jose Matos APRN CRNA       Performed By: CRNA       Location: ICU       Procedure Start/Stop Times: 1/27/2022 10:51 AM and 1/27/2022 11:32 AM       Pre-Anesthestic Checklist: patient identified, risks and benefits discussed, informed consent, monitors and equipment checked and pre-op evaluation  Timeout:       Correct Patient: Yes        Correct Procedure: Yes        Correct Site: Yes        Correct Position: Yes   Procedure   Procedure: arterial line and new line       Laterality: right       Insertion Site: radial.  Sterile Prep        All elements of maximal sterile barrier technique followed       Patient Prep/Sterile Barriers: hand hygiene, sterile gloves, hat, mask, draped, sterile gown, patient draped, draped       Skin prep: Chloraprep  Insertion/Injection        Technique: ultrasound guided, Seldinger Technique and Sravan's test completed        1. Ultrasound was used to evaluate the access site.       2. Artery evaluated via ultrasound for patency/adequacy.       3. Using real-time ultrasound the needle/catheter was observed entering the artery/vein.       5. The visualized structures were anatomically normal.       6. There were no apparent abnormal pathologic findings.       Catheter size: 20 Ga, 15cm.  Narrative         Secured by: suture       Tegaderm and Biopatch dressing used.       Complications: None apparent,        Arterial waveform: Yes        IBP within 10% of NIBP: Yes

## 2022-01-27 NOTE — ANESTHESIA PREPROCEDURE EVALUATION
Anesthesia Pre-Procedure Evaluation    Patient: Stacie Hair   MRN: [id number] : 1955        Preoperative Diagnosis: * No pre-op diagnosis entered *    Procedure : * No procedures listed *          Past Medical History:   Diagnosis Date     Benign positional vertigo 3/2020     Herniated discs      Hypertension 2017     Migraines 1972-     Myalgia and myositis, unspecified     Fibromyalgia     Tinnitus       Past Surgical History:   Procedure Laterality Date     COLONOSCOPY  2011    Procedure:COMBINED COLONOSCOPY, SINGLE BIOPSY/POLYPECTOMY BY BIOPSY; Colonoscopy, with polypectomy by biopsy; Surgeon:MADELEINE MONROY; Location:[facility]     HC EXCISION BREAST LESION, OPEN >=1      benign, age 20     ZZC  DELIVERY ONLY      , Transfusion     ZZC LIGATE FALLOPIAN TUBE,POSTPARTUM      Tubal Ligation      Allergies   Allergen Reactions     Codeine      Eggs [Eggs]      Milk Products      Nsaids      Intolerance      Social History     Tobacco Use     Smoking status: Former Smoker     Packs/day: 0.00     Years: 0.00     Pack years: 0.00     Smokeless tobacco: Never Used     Tobacco comment: -quit   Substance Use Topics     Alcohol use: Yes     Alcohol/week: 0.0 standard drinks     Comment: occasional      Wt Readings from Last 1 Encounters:   22 79.2 kg (174 lb 9.6 oz)              OUTSIDE LABS:  CBC:   Lab Results   Component Value Date    WBC 13.0 (H) 2022    WBC 13.0 (H) 2022    HGB 13.3 2022    HGB 14.2 2022    HCT 35.6 2022    HCT 41.8 2022     2022     2022     BMP:   Lab Results   Component Value Date     2022     (L) 2022    POTASSIUM 4.4 2022    POTASSIUM 3.0 (L) 2022    CHLORIDE 104 2022    CHLORIDE 95 2022    CO2 25 2022    CO2 27 2022    BUN 37 (H) 2022    BUN 26 2022    CR 0.73 2022    CR 0.67 2022     (H)  01/27/2022     (H) 01/27/2022     COAGS:   Lab Results   Component Value Date    PTT 27 08/21/2009    INR 1.09 01/26/2022     POC: No results found for: BGM, HCG, HCGS  HEPATIC:   Lab Results   Component Value Date    ALBUMIN 2.1 (L) 01/26/2022    PROTTOTAL 8.0 01/26/2022    ALT 58 (H) 01/26/2022    AST 60 (H) 01/26/2022    ALKPHOS 142 01/26/2022    BILITOTAL 1.2 01/26/2022     OTHER:   Lab Results   Component Value Date    PH 7.42 01/27/2022    LACT 2.0 01/26/2022    A1C 5.9 (H) 04/20/2021    FATUMA 8.8 01/27/2022    PHOS 3.9 01/26/2022    MAG 2.6 (H) 01/26/2022    LIPASE 131 10/16/2021    TSH 2.18 04/20/2021    .0 (H) 01/27/2022    SED 14 08/21/2009       Anesthesia Plan    ASA Status:  3   - Procedure: Procedure only, no anesthetic delivered               Techniques and Equipment:     - Lines/Monitors: Arterial Line     Consents    Anesthesia Plan(s) and associated risks, benefits, and realistic alternatives discussed. Questions answered and patient/representative(s) expressed understanding.     - Discussed: Risks, Benefits and Alternatives for the PROCEDURE were discussed     - Discussed with:  Patient      - Extended Intubation/Ventilatory Support Discussed: No.      - Patient is DNR/DNI Status: No    Use of blood products discussed: No .     Postoperative Care            Comments:    Other Comments: The risks and benefits of anesthesia, and the alternatives where applicable, have been discussed with the patient, and they wish to proceed.    Arterial line placement per Dr. Rashmi Matos, APRN CRNA

## 2022-01-27 NOTE — PROGRESS NOTES
01/27/22 0738   CPAP/BiPAP/Settings   $BIPAP/CPAP Therapy continuous   IPAP/EPAP (cmH2O) 18/10   Rate (breaths/min) 16   Oxygen (%) 70   Timed Inspiration (sec) 1   IPAP RISE  Settings (V60) 1   CPAP/BiPAP Patient Parameters   IPAP (cm H2O) 18 cmH2O   EPAP (cm H2O) 10 cmH2O   Pressure Support (cm H2O) 14 cmH2O   RR Total (breaths/min) 33 breaths/min   Vt (mL) 220 mL   Minute Ventilation (L/min) 6.1 L/min   Peak Inspiratory Pressure (cm H2O) 19 cmH2O   Pt.  Leak (L/min) 71 L/min   CPAP/BiPAP/AVAPS/AVAPS AE Alarms   High Pressure (cm H2O) 25 cmH2O   Low Pressure (cm H2O) 10   Lo Min Vent 2   High Rate (breaths/min) 60 breaths/min   Low Rate (breaths/min) 10   CPAP/BiPAP/AVAPS/AVAPS AE Patient Assessment   Interface Face Mask - Medium   Skin Integrity intact   RT Device Skin Assessment   Oxygen Delivery Device CPAP/BiPAP Mask   Site Appearance neck circumference Clean and dry   Site Appearance bridge of nose Clean and dry   Site Appearance occiput Clean and dry   Strap Tightness Finger Allowance between head and device strap   Skin Interventions Taken No adjustments needed   Respiratory WDL   Respiratory WDL X   Rhythm/Pattern, Respiratory assisted mechanically   Ambu at Bedside present   Trachea Assessment Midline   Breath Sounds   Breath Sounds All Fields   All Lung Fields Breath Sounds diminished   FRANCISCO J Breath Sounds diminished   LLL Breath Sounds diminished   RUL Breath Sounds diminished   RML Breath Sounds diminished   RLL Breath Sounds diminished     Patient continues to require BIPAP for ventilatory support  Decreased tidal volumes on same pressures of 18/10    Component Ref Range & Units  5:37 AM   (1/27/22) 1 d ago   (1/26/22) 1 d ago   (1/26/22) 19 yr ago   (10/24/02)    pH Arterial 7.35 - 7.45 7.42  7.40   6.5 R     pCO2 Arterial 35 - 45 mm Hg 42  25 Low        pO2 Arterial 80 - 105 mm Hg 102  121 High        Bicarbonate Arterial 21 - 28 mmol/L 27  25       Oxyhemoglobin Arterial 92 - 100 % 96  96        Base Excess/Deficit (+/-) -9.0 - 1.8 mmol/L 2.7 High   4.9 High        FIO2  75  80  100     Resulting Agency  [facility]        PF ratio= 136

## 2022-01-27 NOTE — CONSULTS
"CLINICAL NUTRITION SERVICES - ASSESSMENT NOTE     Nutrition Prescription    RECOMMENDATIONS FOR MDs/PROVIDERS TO ORDER:  Diet adv vs initiation of nutrition support in 2-3 days     Malnutrition Status:    Unable to determine due to lack of filling all parameters needed for diagnosis. Patient is at increased risk for malnutrition. See malnutrition section below.     Recommendations already ordered by Registered Dietitian (RD):  None at this time     Future/Additional Recommendations:  Diet adv per team.   Monitor resp status, weight trends, labs      REASON FOR ASSESSMENT  Stacie Hair is a/an 66 year old female assessed by the dietitian for Admission Nutrition Risk Screen for positive and RN Consult - Poor appetite 1-2 weeks, Covid +    NUTRITION HISTORY  Per Chart Review:   -Presented to ED on 1/26 with hypoxia in setting of COVID-19. Symptoms began on the 12th, worsened over last 24 hrs.   -PMH: Hyperlipidemia, HTN  -Allergies: Eggs & milk products     Patient requiring continuous Bipap. RD to defer telephone visit (covid precautions) with patient at this time. All information obtained from chart review only. RD to continue to follow and obtain full nutrition history as able/appropriate.     CURRENT NUTRITION ORDERS  Diet: NPO  Intake/Tolerance: No intake recorded. Pt has remained NPO due to respiratory status.     LABS  Labs reviewed    MEDICATIONS  Medications reviewed    ANTHROPOMETRICS  Height: 157.5 cm (5' 2\")  Most Recent Weight: 79.2 kg (174 lb 9.6 oz)    IBW: 50 kg  BMI: Obesity Grade I BMI 30-34.9  Weight History:   -No weight loss noted   Wt Readings from Last 10 Encounters:   01/27/22 79.2 kg (174 lb 9.6 oz)   10/16/21 77.1 kg (170 lb)   04/20/21 76.7 kg (169 lb)   03/01/21 77.3 kg (170 lb 8 oz)   02/16/21 77 kg (169 lb 12.1 oz)   12/07/20 78 kg (172 lb)   10/05/20 78.5 kg (173 lb)   09/21/20 78.5 kg (173 lb)   08/06/20 77.1 kg (170 lb)   07/16/20 78 kg (172 lb)       Dosing Weight: 57 kg (adjsuted) "     ASSESSED NUTRITION NEEDS  Estimated Energy Needs: 6489-9144 kcals/day (25 - 30 kcals/kg)  Justification: Maintenance  Estimated Protein Needs: 68-86 grams protein/day (1.2 - 1.5 grams of pro/kg)  Justification: Increased needs  Estimated Fluid Needs: 1 mL/kcal   Justification: Per provider pending fluid status    PHYSICAL FINDINGS  Unable to assess due to isolation precautions secondary to COVID-19 infection. RDs not entering rooms to preserve PPE and minimize exposure, per nutrition department guidelines.  See malnutrition section below.    MALNUTRITION  % Intake: Unable to assess  % Weight Loss: None noted  Subcutaneous Fat Loss: Unable to assess - COVID+ precautions   Muscle Loss: Unable to assess - COVID+ precautions   Fluid Accumulation/Edema: None noted  Malnutrition Diagnosis: Unable to determine due to lack of filling all parameters needed for diagnosis. Patient is at increased risk for malnutrition.     NUTRITION DIAGNOSIS  Inadequate oral intake related to increased respiratory needs requiring bipap as evidenced by pt with NPO diet order with no oral intake since admission.       INTERVENTIONS  Implementation  Nutrition Education: Not appropriate at this time due to patient condition   Collaboration with other providers     Goals  Diet adv v nutrition support within 2-3 days.     Monitoring/Evaluation  Progress toward goals will be monitored and evaluated per protocol.    Omar Mccabe RDN, RIAN  Clinical Dietitian   Office: [phone]  Weekend Pager: [phone]

## 2022-01-27 NOTE — PLAN OF CARE
End of Shift Note  Pt's VSS. Tele shows SR with a BBB. Lung sounds clear/diminished. On 75% FiO2 per bipap. Alert and oriented x 4. Tried ativan x1 for anxiety, but became more anxious with wearing bipap. Pt asking many times to take mask off for oral care. Precedex gtt started at 0245. Currently running at 0.3 mcg/kg/hr. Pt's RASS -1. Heparin gtt infusing at 1450 units/hr. K+ was 3, given 6 bumps and recheck was 4.4. Purewick in place. Blood sugars 150-160, notified Dr. Abreu if insulin gtt needed to be started due to standing order, ok to wait until this am to discuss with MD. Denies pain. Bowel sounds normoactive. Will continue to monitor and provide patient safety.

## 2022-01-27 NOTE — PROGRESS NOTES
Progress:    In ICU, COVID +, BIPAP dependent.  HR SR, BP WDL.  Resp; severely BIPAP dependent; 18/10, 80%. Desats to 70s with water swabs.  Very flat affect.  K being replaced.  Was updated on 's status by offgoing RN.

## 2022-01-27 NOTE — PROGRESS NOTES
Chart reviewed on this new machine to the ICU.  In summary 66-year-old female presents with hypoxic respiratory failure secondary to COVID-19 pneumonia.  She was also found to have bilateral pulmonary embolus on imaging.  Patient is currently on BiPAP 80%.  As per chart review the patient is on remdesivir, Decadron and heparin.  We will continue to monitor the patient from the telemetry hub and all available if any questions.  Immediate recommendations are to continue the patient on Decadron and remdesivir.  Continue on heparin given pulmonary embolus found on imaging.  Would wean FiO2 as tolerated keeping patient's PaO2 greater than 60.  Recommend follow-up arterial blood gas.    Brian Solo MD  Critical Care Medicine

## 2022-01-27 NOTE — PROGRESS NOTES
[facility]    Medicine Progress Note - Hospitalist Service    Date of Admission:  1/26/2022    Assessment & Plan        Stacie Hair is a 66 year old female admitted on 1/26/2022 due to concerns for severe COVID-19 pneumonia, bilateral pulmonary emboli, acute hypoxic respiratory failure and systemic inflammatory response syndrome.  Despite BiPAP, respiratory failure and oxygenation appear to be trending toward worsening.  So far she has not had overt signs of acute cor pulmonale.  She is tolerating therapeutic anticoagulation without bleeding.  Hyponatremia and hypokalemia have resolved with treatment.  She has developed moderate hyperglycemia after starting corticosteroids.  She is hemodynamically stable but appears relatively hypovolemic.  She remains critically ill with life-threatening respiratory failure.    Principal Problem:    Pneumonia due to 2019 novel coronavirus  Active Problems:    Acute respiratory failure with hypoxia (H)    Multiple subsegmental pulmonary emboli without acute cor pulmonale (H)    Hyponatremia-admit Na 131    Hypopotassemia-admit K 3.0    SIRS (systemic inflammatory response syndrome) (H)-tachycardia, tachypnea, leukocytosis    Obesity    Hyperglycemia    Hypertension    Abnormal transaminases    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    Lymphopenia due to COVID-19 virus    LBBB (left bundle branch block)-chronic    Continue BiPAP without interruption, settings adjusted to improve tidal volumes and try to optimize oxygenation  Keep n.p.o. on BiPAP  If respiratory status continues to deteriorate, may need intubation for initiation of mechanical ventilation  Continue Precedex infusion for sedation on BiPAP with lorazepam as needed  Arterial line placement recommended today for close blood gas monitoring with which patient was agreeable  Actemra administration recommended today.  Potential benefits and risks reviewed with the patient along  with the EUA fact sheet which was provided to the patient.  She was agreeable.  Continue IV heparin infusion  Echocardiogram today to assess pulmonary arterial pressures and right heart function  Continue blood sugar monitoring and use sliding scale NovoLog insulin every 4 hours for treatment of hyperglycemia, does not appear to need basal insulin dose or continuous IV insulin infusion at this time       Diet: NPO for Medical/Clinical Reasons Except for: No Exceptions    DVT Prophylaxis: IV heparin  Gomez Catheter: Not present  Central Lines: None  Cardiac Monitoring: ACTIVE order. Indication: ICU  Code Status: Full Code      Disposition Plan   Expected Discharge:  unknown  Anticipated discharge location:  Awaiting care coordination huddle  Delays:  Medical deterioration       The patient's care was discussed with the Bedside Nurse and Patient.  Total critical care time 51 minutes today including time spent reassessing respiratory status and adjusting BiPAP treatment of life-threatening respiratory failure.    Parker Caraballo MD  Hospitalist Service  [facility]  Securely message with the Vocera Web Console (learn more here)  Text page via [employer] Paging/Directory         Clinically Significant Risk Factors Present on Admission                    ______________________________________________________________________    Interval History   Patient had increasing anxiety and difficulty using BiPAP overnight, frequently pulling at the mask.  She was therefore started on Precedex infusion with improvement.  She otherwise offers no complaints today.  She does think that BiPAP has helped her dyspnea.  RT increased BiPAP settings this morning due to low tidal volumes with subsequent improvement in tidal volumes and possible trend toward improvement in oxygenation.  Patient remains hemodynamically stable and afebrile.  She has been oliguric.  She denies any abdominal pain or nausea.  She does  "feel somewhat hungry.    Data reviewed today: I reviewed all medications, new labs and imaging results over the last 24 hours. I personally reviewed cardiac monitor which has not demonstrated any significant dysrhythmias.  She has had intermittent sinus bradycardia.    Physical Exam   Vital Signs: Temp: 97.6  F (36.4  C) Temp src: Axillary BP: 125/81 Pulse: 56   Resp: (!) 32 SpO2: 91 % O2 Device: BiPAP/CPAP 18/10, FiO2 80%  BiPAP measurements: , minute ventilation 9.9  Patient Vitals for the past 24 hrs:   BP Temp Temp src Pulse Resp SpO2 Height Weight   01/27/22 0905 -- -- -- -- -- 91 % -- --   01/27/22 0903 -- -- -- -- -- (!) 83 % -- --   01/27/22 0900 125/81 -- -- 56 (!) 32 91 % -- --   01/27/22 0800 126/79 97.6  F (36.4  C) Axillary 50 24 94 % -- --   01/27/22 0730 124/73 -- -- 51 27 95 % -- --   01/27/22 0700 125/86 -- -- 51 29 94 % -- --   01/27/22 0648 -- -- -- -- -- 94 % -- --   01/27/22 0629 -- -- -- -- -- 93 % -- --   01/27/22 0600 126/71 -- -- 51 29 94 % -- --   01/27/22 0500 113/73 -- -- 51 27 95 % -- --   01/27/22 0437 105/67 -- -- 52 (!) 32 95 % -- --   01/27/22 0400 99/67 -- -- 61 (!) 34 90 % -- 79.2 kg (174 lb 9.6 oz)   01/27/22 0330 126/83 97.4  F (36.3  C) Axillary 76 (!) 34 91 % -- --   01/27/22 0300 129/80 -- -- 84 (!) 44 93 % -- --   01/27/22 0200 (!) 126/95 -- -- 67 30 94 % -- --   01/27/22 0100 -- -- -- 65 28 93 % -- --   01/27/22 0000 119/83 97.4  F (36.3  C) Axillary 70 (!) 33 93 % -- --   01/26/22 2300 -- -- -- 72 26 94 % -- --   01/26/22 2200 119/85 -- -- 66 30 94 % -- --   01/26/22 2100 -- -- -- 69 (!) 35 92 % -- --   01/26/22 2000 115/76 97.6  F (36.4  C) Axillary 71 (!) 35 92 % -- --   01/26/22 1900 -- -- -- 75 29 92 % -- --   01/26/22 1800 124/81 -- -- 84 18 92 % -- --   01/26/22 1700 -- -- -- 82 28 94 % -- --   01/26/22 1615 119/80 98.3  F (36.8  C) Axillary 81 (!) 32 92 % 1.575 m (5' 2\") 80.3 kg (177 lb)   01/26/22 1545 -- -- -- 83 25 94 % -- --   01/26/22 1500 112/73 -- -- 83 " "24 93 % -- --   01/26/22 1430 116/75 -- -- 85 24 92 % -- --   01/26/22 1400 116/72 -- -- 84 28 93 % -- --   01/26/22 1330 121/76 -- -- 90 27 92 % -- --   01/26/22 1300 130/74 -- -- 91 28 91 % -- --   01/26/22 1230 -- -- -- 89 26 92 % -- --   01/26/22 1200 115/69 98.1  F (36.7  C) Oral 90 (!) 43 92 % -- --   01/26/22 1145 110/70 -- -- 90 (!) 40 94 % -- --   01/26/22 1115 113/73 -- -- 99 (!) 45 93 % -- --   01/26/22 1111 -- -- -- 101 (!) 73 93 % -- --   01/26/22 1100 -- -- -- 103 (!) 47 94 % -- --   01/26/22 1055 -- -- -- -- (!) 68 (!) 77 % -- --   01/26/22 1051 (!) 138/91 -- -- 111 (!) 45 (!) 83 % 1.651 m (5' 5\") 79.4 kg (175 lb)     Weight: 174 lbs 9.6 oz Body mass index is 31.93 kg/m .  Vitals:    01/26/22 1051 01/26/22 1615 01/27/22 0400   Weight: 79.4 kg (175 lb) 80.3 kg (177 lb) 79.2 kg (174 lb 9.6 oz)       Intake/Output Summary (Last 24 hours) at 1/27/2022 0912  Last data filed at 1/27/2022 0538  Gross per 24 hour   Intake 826.9 ml   Output 200 ml   Net 626.9 ml     General Appearance: Ill-appearing, mildly obese elderly woman with BMI 32, tachypneic while on BiPAP  Respiratory: Not using accessory muscles, symmetric breath sounds with clear lung fields  Cardiovascular: Borderline bradycardic with regular rhythm, good radial pulse, normal capillary refill, no peripheral edema  GI: Hypoactive bowel sounds, nondistended abdomen, mildly obese, soft, nontender  Skin: No rash  Other: Alert and maintains wakefulness and attention, answering questions appropriately though verbal communication limited by BiPAP mask, purposefully and symmetrically moves all limbs    Data   Recent Labs   Lab 01/27/22  0824 01/27/22  0518 01/27/22  0434 01/26/22  1944 01/26/22  1059 01/26/22  1058   WBC  --  13.0*  --   --  13.0*  --    HGB  --  13.3  --   --  14.2  --    MCV  --  80  --   --  89  --    PLT  --  225  --   --  262  --    INR  --   --   --   --   --  1.09   NA  --  136  --   --   --  131*   POTASSIUM  --  4.4  --   --   -- "  3.0*   CHLORIDE  --  104  --   --   --  95   CO2  --  25  --   --   --  27   BUN  --  37*  --   --   --  26   CR  --  0.73  --   --   --  0.67   ANIONGAP  --  7  --   --   --  9   FATUMA  --  8.8  --   --   --  8.5   * 159* 153*   < >  --  134*   ALBUMIN  --   --   --   --   --  2.1*   PROTTOTAL  --   --   --   --   --  8.0   BILITOTAL  --   --   --   --   --  1.2   ALKPHOS  --   --   --   --   --  142   ALT  --   --   --   --   --  58*   AST  --   --   --   --   --  60*    < > = values in this interval not displayed.     Blood sugars range 153-163 overnight  Heparin 10 a level 0.53 at 2 AM today which is in the therapeutic range  Blood cultures are pending    Recent Labs   Lab 01/27/22  0537 01/26/22  1234 01/26/22  1059   PH 7.42 7.40  --    PCO2 42 25*  --    PO2 102 121*  --    HCO3 27 25  --    O2PER 75 80 100     PaO2 to FiO2 ratio 136 this morning, decreased from 151 yesterday    Recent Results (from the past 24 hour(s))   XR Chest Port 1 View    Narrative    CHEST PORTABLE ONE VIEW January 26, 2022 11:20 AM     HISTORY: Shortness of breath.    COMPARISON: Chest x-rays and CT dated 10/16/2021.    FINDINGS: Extensive bilateral pulmonary infiltrates (worse on the  right) are noted and are new since the prior studies from 2021.  Cardiac silhouette remains mildly enlarged. No pneumothorax or  significant pleural fluid collection.      Impression    IMPRESSION:  1. Bilateral pulmonary infiltrates could represent pulmonary edema or  infectious process such as COVID-19 pneumonia. Recommend clinical  correlation.  2. Mild cardiomegaly again noted.    I discussed the severe bilateral pulmonary infiltrates with Dr. Leal  on 1/26/2022 at 11:25 AM.    XAVIER MARVIN MD         SYSTEM ID:  [id number]   CT Chest Pulmonary Embolism w Contrast   Result Value    Radiologist flags Pulmonary embolism (AA)    Narrative    CT CHEST PULMONARY EMBOLISM W CONTRAST 1/26/2022 12:34 PM    CLINICAL HISTORY: PE suspected,  low/intermediate prob, positive  D-dimer. Shortness of breath. COVID19 and pneumonia.  TECHNIQUE: CT angiogram chest during arterial phase injection IV  contrast. 2D and 3D MIP reconstructions were performed by the CT  technologist. Dose reduction techniques were used.     CONTRAST: 70mL, Isovue 370    COMPARISON: 10/16/2021    FINDINGS:  ANGIOGRAM CHEST: Small filling defects in right lower lobe, right  middle lobe and left upper lobe segmental and subsegmental pulmonary  arteries, consistent with pulmonary emboli. Thoracic aorta is negative  for dissection. No CT evidence of right heart strain.    LUNGS AND PLEURA: Severe bilateral groundglass and patchy infiltrates  consistent with pneumonia. No pleural effusion.    MEDIASTINUM/AXILLAE: New mild hilar and mediastinal lymphadenopathy.  For example, there is a 2.1 cm precarinal lymph node (series 4, image  45) and a 2.0 cm right infrahilar lymph node (series 4, image 55). No  thoracic aortic aneurysm. No coronary artery calcifications. No  pericardial effusion.    UPPER ABDOMEN: Hepatic steatosis. Mild hypodense thickening of the  adrenal glands bilaterally without a focal mass, new or more prominent  since 10/16/2021.    MUSCULOSKELETAL: No concerning bone lesions.      Impression    IMPRESSION:  1.  Positive for bilateral segmental and subsegmental pulmonary  emboli.  2.  Severe bilateral infiltrates consistent with COVID 19 pneumonia.  3.  New mild mediastinal and hilar lymphadenopathy is nonspecific,  likely reactive.  4.  Stable hepatic steatosis.  5.  New mild hypodense thickening of the adrenal glands bilaterally  without a focal mass, of uncertain clinical significance. A follow-up  CT in 6 months can be considered to assess for changes, if clinically  indicated.    [Critical Result: Pulmonary embolism]    Finding was identified on 1/26/2022 1:04 PM.     Dr. Leal was contacted by me on 1/26/2022 1:17 PM and verbalized  understanding of the critical result.       WAN OSWALD MD         SYSTEM ID:  [id number]     Medications     dexmedetomidine 0.3 mcg/kg/hr (01/27/22 0326)     heparin 1,450 Units/hr (01/27/22 0307)     - MEDICATION INSTRUCTIONS -         dexamethasone  6 mg Intravenous Q24H

## 2022-01-28 PROBLEM — I42.9 CARDIOMYOPATHY (H): Status: ACTIVE | Noted: 2022-01-28

## 2022-01-28 PROBLEM — E11.9 NEW ONSET TYPE 2 DIABETES MELLITUS (H): Status: ACTIVE | Noted: 2022-01-27

## 2022-01-28 LAB
ANION GAP SERPL CALCULATED.3IONS-SCNC: 8 MMOL/L (ref 3–14)
BASE EXCESS BLDA CALC-SCNC: 1.9 MMOL/L (ref -9–1.8)
BASE EXCESS BLDA CALC-SCNC: 3.2 MMOL/L (ref -9–1.8)
BUN SERPL-MCNC: 47 MG/DL (ref 7–30)
CALCIUM SERPL-MCNC: 9 MG/DL (ref 8.5–10.1)
CHLORIDE BLD-SCNC: 109 MMOL/L (ref 94–109)
CO2 SERPL-SCNC: 24 MMOL/L (ref 20–32)
CREAT SERPL-MCNC: 0.7 MG/DL (ref 0.52–1.04)
CRP SERPL-MCNC: 212 MG/L (ref 0–8)
ERYTHROCYTE [DISTWIDTH] IN BLOOD BY AUTOMATED COUNT: 13.7 % (ref 10–15)
GFR SERPL CREATININE-BSD FRML MDRD: >90 ML/MIN/1.73M2
GLUCOSE BLD-MCNC: 156 MG/DL (ref 70–99)
GLUCOSE BLDC GLUCOMTR-MCNC: 139 MG/DL (ref 70–99)
GLUCOSE BLDC GLUCOMTR-MCNC: 140 MG/DL (ref 70–99)
GLUCOSE BLDC GLUCOMTR-MCNC: 146 MG/DL (ref 70–99)
GLUCOSE BLDC GLUCOMTR-MCNC: 160 MG/DL (ref 70–99)
GLUCOSE BLDC GLUCOMTR-MCNC: 162 MG/DL (ref 70–99)
HCO3 BLD-SCNC: 25 MMOL/L (ref 21–28)
HCO3 BLD-SCNC: 26 MMOL/L (ref 21–28)
HCT VFR BLD AUTO: 40.9 % (ref 35–47)
HGB BLD-MCNC: 14.1 G/DL (ref 11.7–15.7)
LDH SERPL L TO P-CCNC: 459 U/L (ref 81–234)
MCH RBC QN AUTO: 30.5 PG (ref 26.5–33)
MCHC RBC AUTO-ENTMCNC: 34.5 G/DL (ref 31.5–36.5)
MCV RBC AUTO: 88 FL (ref 78–100)
O2/TOTAL GAS SETTING VFR VENT: 60 %
O2/TOTAL GAS SETTING VFR VENT: 60 %
PCO2 BLD: 34 MM HG (ref 35–45)
PCO2 BLD: 34 MM HG (ref 35–45)
PH BLD: 7.48 [PH] (ref 7.35–7.45)
PH BLD: 7.5 [PH] (ref 7.35–7.45)
PLATELET # BLD AUTO: 275 10E3/UL (ref 150–450)
PO2 BLD: 76 MM HG (ref 80–105)
PO2 BLD: 87 MM HG (ref 80–105)
POTASSIUM BLD-SCNC: 4.1 MMOL/L (ref 3.4–5.3)
RBC # BLD AUTO: 4.63 10E6/UL (ref 3.8–5.2)
SODIUM SERPL-SCNC: 141 MMOL/L (ref 133–144)
UFH PPP CHRO-ACNC: 0.53 IU/ML
UFH PPP CHRO-ACNC: 0.71 IU/ML
UFH PPP CHRO-ACNC: 0.97 IU/ML
WBC # BLD AUTO: 11.6 10E3/UL (ref 4–11)

## 2022-01-28 PROCEDURE — 82803 BLOOD GASES ANY COMBINATION: CPT | Performed by: ANESTHESIOLOGY

## 2022-01-28 PROCEDURE — 999N000105 HC STATISTIC NO DOCUMENTATION TO SUPPORT CHARGE: Performed by: REGISTERED NURSE

## 2022-01-28 PROCEDURE — 999N000105 HC STATISTIC NO DOCUMENTATION TO SUPPORT CHARGE

## 2022-01-28 PROCEDURE — 250N000009 HC RX 250: Performed by: PEDIATRICS

## 2022-01-28 PROCEDURE — 99291 CRITICAL CARE FIRST HOUR: CPT | Performed by: PEDIATRICS

## 2022-01-28 PROCEDURE — 80048 BASIC METABOLIC PNL TOTAL CA: CPT | Performed by: PEDIATRICS

## 2022-01-28 PROCEDURE — 85520 HEPARIN ASSAY: CPT | Performed by: PEDIATRICS

## 2022-01-28 PROCEDURE — 83615 LACTATE (LD) (LDH) ENZYME: CPT | Performed by: PEDIATRICS

## 2022-01-28 PROCEDURE — 999N000157 HC STATISTIC RCP TIME EA 10 MIN

## 2022-01-28 PROCEDURE — 94660 CPAP INITIATION&MGMT: CPT

## 2022-01-28 PROCEDURE — 250N000013 HC RX MED GY IP 250 OP 250 PS 637: Performed by: PEDIATRICS

## 2022-01-28 PROCEDURE — 85027 COMPLETE CBC AUTOMATED: CPT | Performed by: PEDIATRICS

## 2022-01-28 PROCEDURE — 82803 BLOOD GASES ANY COMBINATION: CPT | Performed by: PEDIATRICS

## 2022-01-28 PROCEDURE — 250N000011 HC RX IP 250 OP 636: Performed by: PEDIATRICS

## 2022-01-28 PROCEDURE — 200N000001 HC R&B ICU

## 2022-01-28 PROCEDURE — 86140 C-REACTIVE PROTEIN: CPT | Performed by: PEDIATRICS

## 2022-01-28 RX ORDER — GLIPIZIDE 10 MG/1
1 TABLET ORAL 3 TIMES DAILY
Status: DISCONTINUED | OUTPATIENT
Start: 2022-01-28 | End: 2022-02-07 | Stop reason: HOSPADM

## 2022-01-28 RX ADMIN — INSULIN ASPART 1 UNITS: 100 INJECTION, SOLUTION INTRAVENOUS; SUBCUTANEOUS at 03:45

## 2022-01-28 RX ADMIN — INSULIN ASPART 1 UNITS: 100 INJECTION, SOLUTION INTRAVENOUS; SUBCUTANEOUS at 20:03

## 2022-01-28 RX ADMIN — DEXTRAN 70, GLYCERIN, HYPROMELLOSE 1 DROP: 1; 2; 3 SOLUTION/ DROPS OPHTHALMIC at 12:14

## 2022-01-28 RX ADMIN — LORAZEPAM 0.5 MG: 2 INJECTION INTRAMUSCULAR; INTRAVENOUS at 20:00

## 2022-01-28 RX ADMIN — DEXMEDETOMIDINE HYDROCHLORIDE 0.5 MCG/KG/HR: 4 INJECTION, SOLUTION INTRAVENOUS at 05:26

## 2022-01-28 RX ADMIN — DEXTRAN 70, GLYCERIN, HYPROMELLOSE 1 DROP: 1; 2; 3 SOLUTION/ DROPS OPHTHALMIC at 20:00

## 2022-01-28 RX ADMIN — ALBUTEROL SULFATE 2 PUFF: 90 AEROSOL, METERED RESPIRATORY (INHALATION) at 01:35

## 2022-01-28 RX ADMIN — DEXAMETHASONE SODIUM PHOSPHATE 6 MG: 10 INJECTION INTRAMUSCULAR; INTRAVENOUS at 13:04

## 2022-01-28 RX ADMIN — DEXMEDETOMIDINE HYDROCHLORIDE 0.5 MCG/KG/HR: 4 INJECTION, SOLUTION INTRAVENOUS at 14:32

## 2022-01-28 RX ADMIN — DEXTRAN 70, GLYCERIN, HYPROMELLOSE 1 DROP: 1; 2; 3 SOLUTION/ DROPS OPHTHALMIC at 16:05

## 2022-01-28 RX ADMIN — LORAZEPAM 0.5 MG: 2 INJECTION INTRAMUSCULAR; INTRAVENOUS at 05:02

## 2022-01-28 RX ADMIN — INSULIN ASPART 1 UNITS: 100 INJECTION, SOLUTION INTRAVENOUS; SUBCUTANEOUS at 16:06

## 2022-01-28 RX ADMIN — LORAZEPAM 0.5 MG: 2 INJECTION INTRAMUSCULAR; INTRAVENOUS at 16:04

## 2022-01-28 RX ADMIN — INSULIN ASPART 1 UNITS: 100 INJECTION, SOLUTION INTRAVENOUS; SUBCUTANEOUS at 08:41

## 2022-01-28 RX ADMIN — INSULIN ASPART 1 UNITS: 100 INJECTION, SOLUTION INTRAVENOUS; SUBCUTANEOUS at 23:57

## 2022-01-28 RX ADMIN — HEPARIN SODIUM 1250 UNITS/HR: 10000 INJECTION, SOLUTION INTRAVENOUS at 09:58

## 2022-01-28 ASSESSMENT — ACTIVITIES OF DAILY LIVING (ADL)
ADLS_ACUITY_SCORE: 8
ADLS_ACUITY_SCORE: 10
ADLS_ACUITY_SCORE: 8
ADLS_ACUITY_SCORE: 8
ADLS_ACUITY_SCORE: 10
ADLS_ACUITY_SCORE: 8
ADLS_ACUITY_SCORE: 10
ADLS_ACUITY_SCORE: 8
ADLS_ACUITY_SCORE: 10
ADLS_ACUITY_SCORE: 10
ADLS_ACUITY_SCORE: 8
ADLS_ACUITY_SCORE: 10
ADLS_ACUITY_SCORE: 8
ADLS_ACUITY_SCORE: 10
ADLS_ACUITY_SCORE: 8
ADLS_ACUITY_SCORE: 8

## 2022-01-28 ASSESSMENT — MIFFLIN-ST. JEOR: SCORE: 1302.93

## 2022-01-28 NOTE — PROGRESS NOTES
[facility]    Medicine Progress Note - Hospitalist Service    Date of Admission:  1/26/2022    Assessment & Plan          Stacie Hair is a 66 year old female admitted on 1/26/2022 due to concerns for severe COVID-19 pneumonia, bilateral pulmonary emboli, acute hypoxic respiratory failure and systemic inflammatory response syndrome.  Despite optimizing BiPAP and treatment with therapeutic anticoagulation, oxygenation continues to trend toward worsening.  Echocardiogram was abnormal with decreased LVEF 40 to 45% concerning for cardiomyopathy which is a new diagnosis.  A1c was elevated at 6.5 concerning for type 2 diabetes which is also a new diagnosis.  Overall, she remains critically ill with life-threatening respiratory failure.    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    Lymphopenia due to COVID-19 virus    LBBB (left bundle branch block)-chronic    Obesity    Respiratory: Persistent severe hypoxic respiratory failure despite BiPAP, PaO2 to FiO2 ratio 127 today, slightly worsened compared with yesterday, respiratory failure likely due to the combination of COVID-19 pneumonia and multiple bilateral pulmonary emboli   -Continue BiPAP, will not attempt to transition to high flow nasal cannula for meals today because of worsening oxygenation   -Monitor blood gases   -Reconsider intubation for mechanical ventilation and prone positioning if worsening, could attempt prone positioning on BiPAP if  able   -Continue Precedex for sedation titrated to RASS goal of calm to drowsy   -PT consulted for bed mobility    Infectious disease: COVID-19 infection with SIRS, no other infection suspected so far   -Continue dexamethasone   -Actemra administered yesterday    Cardiovascular: Multiple bilateral pulmonary emboli being treated with therapeutic anticoagulation, abnormal echo yesterday with nondilated LV but decreased LVEF of 40 to 45% suspected to be acute, cause for new cardiomyopathy is not clear but could be related to COVID-19 and viral infection although troponin was normal arguing against active myocarditis or ischemia and she is not known to have ischemic heart disease, stress cardiomyopathy seems most likely, has chronic left bundle branch block on EKG but no other dysrhythmias evident during hospitalization, normal RV and estimated right-sided pressures on echocardiogram despite multiple pulmonary emboli, hemodynamically stable so far, will be at risk for volume overload and heart failure but heart failure not suspected at this time   -Continuous cardiac monitoring in ICU   -Adding beta-blocker considered but she has resting bradycardia possibly exacerbated by Precedex infusion   -Monitor volume status, avoid excessive fluid resuscitation   -Continue high intensity heparin infusion titrated to Xa target    Endocrine: New diagnosis type 2 diabetes based on hemoglobin A1c 6.5, mildly to moderately hyperglycemic but has not had any nutritional intake   -Continue NovoLog correction scale every 4 hours   -Reconsider adding basal insulin if hyperglycemia worsens necessitating higher doses of NovoLog   -Anticipate additional diabetic education once her clinical status is more stable    FEN: Day 3 hospitalization without nutritional intake and likely had inadequate nutritional intake at home for a period of time prior to hospitalization due to acute illness, was evaluated by registered dietitian yesterday who  advised monitoring for now, does not have central venous access so far, admitted with hyponatremia that has resolved and hypopotassemia that has resolved    -not attempting oral intake today due to worsening oxygenation and for same reason would not attempt placement of feeding tube today   -Reassess options for nutritional support in the next 24 hours, prefer initiation of enteral feeding if oxygenation improves such that she can either tolerate oral intake or placement of feeding tube for enteral feeding       Diet: NPO for Medical/Clinical Reasons Except for: No Exceptions    DVT Prophylaxis: IV heparin  Gomez Catheter: Not present  Central Lines: None  Cardiac Monitoring: ACTIVE order. Indication: ICU  Code Status: Full Code      Disposition Plan   Expected Discharge: 01/31/2022     Anticipated discharge location:  Awaiting care coordination huddle  Delays:     Covid Test Positive  Oxygen Needs            The patient's care was discussed with the Patient.  Total critical care time 49 minutes so far today including time spent reassessing respiratory status and adjusting BiPAP treatment of life-threatening respiratory failure.    Parker Caraballo MD  Hospitalist Service  [facility]  Securely message with the Vocera Web Console (learn more here)  Text page via [employer] Paging/Directory         Clinically Significant Risk Factors Present on Admission                 ______________________________________________________________________    Interval History   Patient says that she feels like she is breathing comfortably on BiPAP.  BiPAP settings were adjusted yesterday with improvement in tidal volumes and FiO2 was subsequently weaned from 80% to 60%.  She says her eyes feel dry.  She would like to move today.  She denies any abdominal discomfort or nausea.  She denies any chest pain or dizziness.  She remains afebrile and hemodynamically stable although has had frequent bradycardia while  receiving Precedex infusion.  She says she feels tired.  She remains n.p.o. and oliguric.    Data reviewed today: I reviewed all medications, new labs and imaging results over the last 24 hours. I personally reviewed cardiac monitor demonstrating no significant dysrhythmias and persistent left bundle branch block.  She has had intermittent sinus bradycardia alternating with sinus rhythm.    Physical Exam   Vital Signs: Temp: 97.7  F (36.5  C) Temp src: Rectal BP: 135/80 Pulse: 54   Resp: 30 SpO2: 94 % O2 Device: BiPAP/CPAP   BiPAP settings 22/14 with FiO2 60%  BiPAP measurements: -1000, minute ventilation 12-20  Patient Vitals for the past 24 hrs:   BP Temp Temp src Pulse Resp SpO2 Weight   01/28/22 0900 127/74 97.5  F (36.4  C) Rectal 52 (!) 32 93 % --   01/28/22 0800 135/80 97.7  F (36.5  C) Rectal 54 30 94 % --   01/28/22 0700 133/84 97.7  F (36.5  C) -- 58 30 95 % --   01/28/22 0600 120/76 97.5  F (36.4  C) -- 53 29 94 % 80.5 kg (177 lb 6.4 oz)   01/28/22 0500 118/77 97.3  F (36.3  C) -- 60 (!) 31 92 % --   01/28/22 0400 123/78 97.3  F (36.3  C) -- 56 (!) 31 95 % --   01/28/22 0300 108/70 97.5  F (36.4  C) -- 63 (!) 38 93 % --   01/28/22 0200 112/67 96.8  F (36  C) Rectal 57 (!) 40 93 % --   01/28/22 0147 -- -- -- 55 (!) 38 92 % --   01/28/22 0140 -- -- -- 56 (!) 33 90 % --   01/28/22 0120 -- -- -- 62 (!) 38 (!) 87 % --   01/28/22 0116 101/70 -- -- 61 (!) 35 93 % --   01/28/22 0100 120/73 97.1  F (36.2  C) Axillary 54 28 -- --   01/28/22 0000 127/77 -- -- 52 29 95 % --   01/27/22 2325 -- 96.8  F (36  C) Temporal 52 (!) 34 93 % --   01/27/22 2201 119/83 (!) 95  F (35  C) Temporal 52 (!) 34 94 % --   01/27/22 2200 119/83 -- -- 53 (!) 32 95 % --   01/27/22 2100 -- (!) 95.4  F (35.2  C) Rectal (!) 48 (!) 32 95 % --   01/27/22 2000 118/81 -- -- (!) 49 29 95 % --   01/27/22 1900 -- -- -- 50 30 93 % --   01/27/22 1800 121/81 -- -- (!) 46 26 97 % --   01/27/22 1700 -- -- -- (!) 47 23 96 % --   01/27/22 1630 -- --  -- (!) 46 24 97 % --   01/27/22 1600 123/87 -- -- (!) 47 24 96 % --   01/27/22 1530 -- -- -- (!) 48 25 96 % --   01/27/22 1500 -- 97.8  F (36.6  C) Axillary 51 28 94 % --   01/27/22 1430 119/83 -- -- 51 28 91 % --   01/27/22 1400 -- -- -- 59 28 94 % --   01/27/22 1330 -- -- -- (!) 47 23 96 % --   01/27/22 1300 -- -- -- (!) 46 24 98 % --   01/27/22 1230 -- -- -- (!) 47 25 98 % --   01/27/22 1200 -- -- -- 50 (!) 33 93 % --   01/27/22 1130 -- -- -- (!) 49 30 95 % --   01/27/22 1100 114/72 -- -- 51 (!) 32 95 % --   01/27/22 1000 121/80 -- -- (!) 49 25 96 % --     Weight: 177 lbs 6.4 oz   Vitals:    01/26/22 1051 01/26/22 1615 01/27/22 0400 01/28/22 0600   Weight: 79.4 kg (175 lb) 80.3 kg (177 lb) 79.2 kg (174 lb 9.6 oz) 80.5 kg (177 lb 6.4 oz)       Intake/Output Summary (Last 24 hours) at 1/28/2022 0914  Last data filed at 1/28/2022 0600  Gross per 24 hour   Intake 1016 ml   Output 600 ml   Net 416 ml     Cumulative I/O 1.0L positive for hospitalization    General Appearance: Ill-appearing, tachypneic on BiPAP and appears tired  Respiratory: No use accessory muscles, symmetric breath sounds with clear lung fields  Cardiovascular: Slow but regular heart rate and rhythm, palpable radial pulse, normal capillary refill, no peripheral edema  GI: Hypoactive bowel sounds, nondistended abdomen, soft, obese, nontender  Skin: Normal color, arterial line skin entry site is without bleeding, drainage, or surrounding erythema  Other: Somnolent but opens eyes to voice and answers questions appropriately, communication limited by difficulties from vocalizing through BiPAP mask    Data   Recent Labs   Lab 01/28/22  0832 01/28/22  0508 01/28/22  0343 01/27/22  2314 01/27/22  0824 01/27/22  0518 01/26/22  1944 01/26/22  1059 01/26/22  1058   WBC  --  11.6*  --   --   --  13.0*  --  13.0*  --    HGB  --  14.1  --   --   --  13.3  --  14.2  --    MCV  --  88  --   --   --  80  --  89  --    PLT  --  275  --   --   --  225  --  262  --     INR  --   --   --   --   --   --   --   --  1.09   NA  --   --   --   --   --  136  --   --  131*   POTASSIUM  --   --   --   --   --  4.4  --   --  3.0*   CHLORIDE  --   --   --   --   --  104  --   --  95   CO2  --   --   --   --   --  25  --   --  27   BUN  --   --   --   --   --  37*  --   --  26   CR  --   --   --   --   --  0.73  --   --  0.67   ANIONGAP  --   --   --   --   --  7  --   --  9   FATUMA  --   --   --   --   --  8.8  --   --  8.5   *  --  160* 157*   < > 159*   < >  --  134*   ALBUMIN  --   --   --   --   --   --   --   --  2.1*   PROTTOTAL  --   --   --   --   --   --   --   --  8.0   BILITOTAL  --   --   --   --   --   --   --   --  1.2   ALKPHOS  --   --   --   --   --   --   --   --  142   ALT  --   --   --   --   --   --   --   --  58*   AST  --   --   --   --   --   --   --   --  60*    < > = values in this interval not displayed.     Heparin 10 a level 0.97, above the therapeutic range    Blood sugars ranged 140 167 over the last day, hemoglobin A1c was 6.5  Blood cultures are negative so far    Recent Labs   Lab 22  0834 22  1557 22  1408 22  0537   PH 7.50* 7.45 7.46* 7.42   PCO2 34* 37 38 42   PO2 76* 97 65* 102   HCO3 26 25 27 27   O2PER 60 60 60 75     PaO2 to FiO2 ratio 127 today, decreased from 162 yesterday afternoon    Recent Results (from the past 24 hour(s))   Echocardiogram Complete   Result Value    LVEF  40-45%    Narrative    [id number]  [id number]  [id number]  [id number]^YOUSIF^CRISTAL^IBETH     [facility]  Echocardiography Laboratory  [address] [place] [address]     Name: ANDREE VASQUEZ  MRN: [id number]  : 1955  Study Date: 2022 10:09 AM  Age: 66 yrs  Gender: Female  Patient Location: [facility]  Reason For Study: Pulmonary Emboli, Covid +  History: HTN,Obesity,LBBB  Ordering Physician: CRISTAL DODD  Referring Physician: Schoen, Gregory, MD  Performed By: Kandace Giron     BSA: 1.8 m2  Height: 62  in  Weight: 175 lb  HR: 56  BP: 125/81 mmHg  ______________________________________________________________________________  Procedure  Complete Portable Echo Adult. Optison (NDC #[id number]) given intravenously.  ______________________________________________________________________________  Interpretation Summary     The left ventricle is normal in size.  There is mild-moderate global hypokinesia of the left ventricle.  The visual ejection fraction is 40-45%.  The right ventricle is normal in structure, function and size.  There is mild (1+) mitral regurgitation.  There is trace tricuspid regurgitation.  Right ventricle systolic pressure estimate normal  There is mild (1+) aortic regurgitation.  There is no comparison study available.  ______________________________________________________________________________  Left Ventricle  The left ventricle is normal in size. There is normal left ventricular wall  thickness. The visual ejection fraction is 40-45%. Diastolic Doppler findings  (E/E' ratio and/or other parameters) suggest left ventricular filling  pressures are indeterminate. There is mild-moderate global hypokinesia of the  left ventricle.     Right Ventricle  The right ventricle is normal in structure, function and size.     Atria  Normal left atrial size. Right atrial size is normal. There is no atrial shunt  seen.     Mitral Valve  There is mild (1+) mitral regurgitation.     Tricuspid Valve  Right ventricle systolic pressure estimate normal. There is trace tricuspid  regurgitation. IVC diameter >2.1 cm collapsing <50% with sniff suggests a high  RA pressure estimated at 15 mmHg or greater. The patient is on positive  pressure ventilation, making assessment of the IVC size less reliable for  determining central venous pressure. The right ventricular systolic pressure  is approximated at 17.8 mmHg plus the right atrial pressure.     Aortic Valve  The aortic valve is trileaflet with aortic valve sclerosis.  There is mild (1+)  aortic regurgitation. No aortic stenosis is present.     Pulmonic Valve  The pulmonic valve is not well seen, but is grossly normal. There is trace  pulmonic valvular regurgitation.     Vessels  Normal size aorta.     Pericardium  There is no pericardial effusion.     Rhythm  Sinus rhythm was noted.  ______________________________________________________________________________  MMode/2D Measurements & Calculations  IVSd: 1.1 cm     LVIDd: 4.2 cm  LVIDs: 3.1 cm  LVPWd: 1.1 cm  FS: 26.2 %  LV mass(C)d: 157.1 grams  LV mass(C)dI: 87.0 grams/m2  Ao root diam: 3.3 cm  LA dimension: 3.5 cm  asc Aorta Diam: 2.8 cm  LA/Ao: 1.1  LA Volume (BP): 62.0 ml  LA Volume Index (BP): 34.3 ml/m2  RWT: 0.52     Doppler Measurements & Calculations  MV E max john: 63.0 cm/sec  MV A max john: 37.5 cm/sec  MV E/A: 1.7  MV dec time: 0.34 sec  TR max john: 210.3 cm/sec  TR max P.8 mmHg  E/E' av.0  Lateral E/e': 12.3  Medial E/e': 11.6     ______________________________________________________________________________  Report approved by: Darío Birch 2022 01:09 PM           Old medical records were reviewed for comparison.  On 2011, she had nuclear imaging cardiac stress testing that was without inducible ischemia and which demonstrated normal LVEF 74%.    Medications     dexmedetomidine 0.5 mcg/kg/hr (22 0526)     heparin 1,250 Units/hr (22 0708)     - MEDICATION INSTRUCTIONS -         dexamethasone  6 mg Intravenous Q24H     insulin aspart  1-6 Units Subcutaneous Q4H

## 2022-01-28 NOTE — PROGRESS NOTES
Chart reviewed on this 66-year-old female with hypoxic respiratory failure secondary to COVID-19 pneumonia.  Other concern overnight patient has had escalating levels of BiPAP with increased oxygen requirements resulting in a PF ratio of 127.  This is slightly worse than yesterday.  Agree with plan to obtain arterial blood gas later in the day and discussed with patient possibility of intubation for mechanical ventilation and prone positioning.  Patient may also benefit from high levels of PEEP which could be delivered after she is intubated.  Also agree with plan not to transition to high flow today because of oxygenation.  Would recommend keeping patient n.p.o. for possible intubation.  Encourage patient to self proning.  Given her COVID-19 infection would continue her Decadron dosing.  Patient was also found to have bilateral pulmonary emboli which she is being appropriately anticoagulated for.  Echo did show a possible new cardiomyopathy which could be related to COVID-19 pneumonia.  Agree with plan to hold beta-blocker.  Given concern for stress-induced hyperglycemia continue current insulin regimen.  Concern for protein calorie malnutrition, at this time the patient is too oxygen dependent to be taken off noninvasive ventilation with placement of a nasojejunal tube we will continue to monitor see if the patient does require intubation.  If she does require intubation then would recommend nasojejunal tube and starting of tube feeds.  Overall we will continue to follow the patient with you.  We will follow up on the patient's p.m. blood gas and we are available for any questions if need arises.    Brian Solo MD  Critical Care Medicine

## 2022-01-28 NOTE — PLAN OF CARE
Major Shift Events: Patient continues to require more oxygen on BIPAP.     Neuro: Alert and oriented X 4. Generalized weakness  Resp: LS diminished on BIPAP   CV: Sinus Sarbjit on monitor.  GI/: Purewick in place.   Lines/Tubes/Drains: PIV X 2, right radial arterial line.     Plan: Monitor respiratory status.   For vital signs and complete assessments, please see documentation flowsheets.     Meri Wolff RN

## 2022-01-28 NOTE — PROGRESS NOTES
Daughter called for update. Daughter informed RN that daughter had been on vent in the past for sayra White.  Daughter is very concerned about staff over sedating patient. Daughter educated about sedation scale and that her mother is able to have a conversation with staff, is alert and oriented and able to make her needs known.  Daughter is also concerned about pt's poor apatite and requesting RN to put in an order for TPN. RN educated daughter about process of getting things like TPN ordered and that a doctor has to order medications. RN offered to have MD talk to doctor about many questions. Daughter verbalized not wanting to talk to MD at this time but asked to let doctor know. MD made aware. Now new orders this time.

## 2022-01-28 NOTE — PLAN OF CARE
Pt rectal temp at start of shift 95.5. Naren hugger ordered and applied. Temporal temp probe unable to remain applied to pts skin.  Rectal temp probe used. Rectal temps remain greater than 97 with naren hugger. LS diminished throughout. Tachypnea with RR in 30s. Pt encouraged to lay on side or prone. Pt unable to tolerate. Pt in bed with head elevated. Pt adjusting Bipap mask frequently d/t comfort. Precedex drip adjusted as tolerated per MAR and PRN ativan given per MAR which has been effective. Pt alert and oriented. Mouth swabbed frequently as requested. Pt desaturates to upper 70% on room air. Mouth moisturizer utilized which has been effective. Pt resting quietly with eyes closed at this time.

## 2022-01-28 NOTE — PLAN OF CARE
S-(situation): Physical therapy evaluation order received    B-(background): Patient is a 66 year old female, admitted from the ED 1/26/22 due to severe COVID 19 pneumonia with bilateral PE, acute hypoxic respiratory failure with SIRS. Patient with a previous medical history of cardiomyopathy, HTN, obesity, hyperlipidemia, migraines, vertigo and left bundle branch block.    A-(assessment): per chart review patient requiring increased oxygen support and with decline in lab values tele-ICU is advising potential progression towards intubation. PT visualized patient at 1030, patient on bearhugger today for temperature support and on BiPAP.    R-(recommendations): hold PT consult until patient is medically stabilized. Nursing to complete ICU early mobilization protocol with patient and encourage patient to move about within her tolerance and capacity.    Thank you for your referral.  Zeinab Rogers, PT, DPT, ATC, [facility]  O: [phone]  E: [email or web address]

## 2022-01-28 NOTE — ANESTHESIA POSTPROCEDURE EVALUATION
Patient: Stacie Hair    Procedure: * No procedures listed *       Diagnosis:* No pre-op diagnosis entered *  Diagnosis Additional Information: No value filed.    Anesthesia Type:  No value filed.    Note:  Disposition: Inpatient; ICU            ICU Sign Out: Anesthesiologist/ICU physician sign out WAS performed   Postop Pain Control: Uneventful            Sign Out: Well controlled pain   PONV:    Neuro/Psych: Uneventful            Sign Out: Acceptable/Baseline neuro status   Airway/Respiratory: Uneventful            Sign Out: Acceptable/Baseline resp. status   CV/Hemodynamics: Uneventful            Sign Out: Acceptable CV status; No obvious hypovolemia; No obvious fluid overload   Other NRE:    DID A NON-ROUTINE EVENT OCCUR?     Event details/Postop Comments:  Arterial line functioning well with no issues noted. Will continue to monitor per ICU staff.           Last vitals:  Vitals Value Taken Time   BP     Temp 96.98  F (36.1  C) 01/28/22 1056   Pulse 51 01/28/22 1057   Resp 25 01/28/22 1057   SpO2 95 % 01/28/22 1057   Vitals shown include unvalidated device data.    Electronically Signed By: RUPA Crow CRNA  January 28, 2022  10:58 AM

## 2022-01-29 PROBLEM — E87.0 HYPERNATREMIA: Status: ACTIVE | Noted: 2022-01-29

## 2022-01-29 LAB
ANION GAP SERPL CALCULATED.3IONS-SCNC: 5 MMOL/L (ref 3–14)
BASE EXCESS BLDA CALC-SCNC: 1.5 MMOL/L (ref -9–1.8)
BASE EXCESS BLDA CALC-SCNC: 2.1 MMOL/L (ref -9–1.8)
BASE EXCESS BLDA CALC-SCNC: 2.2 MMOL/L (ref -9–1.8)
BUN SERPL-MCNC: 43 MG/DL (ref 7–30)
CALCIUM SERPL-MCNC: 8.7 MG/DL (ref 8.5–10.1)
CHLORIDE BLD-SCNC: 118 MMOL/L (ref 94–109)
CO2 SERPL-SCNC: 25 MMOL/L (ref 20–32)
CREAT SERPL-MCNC: 0.67 MG/DL (ref 0.52–1.04)
CRP SERPL-MCNC: 116 MG/L (ref 0–8)
ERYTHROCYTE [DISTWIDTH] IN BLOOD BY AUTOMATED COUNT: 14.1 % (ref 10–15)
GFR SERPL CREATININE-BSD FRML MDRD: >90 ML/MIN/1.73M2
GLUCOSE BLD-MCNC: 145 MG/DL (ref 70–99)
GLUCOSE BLDC GLUCOMTR-MCNC: 114 MG/DL (ref 70–99)
GLUCOSE BLDC GLUCOMTR-MCNC: 122 MG/DL (ref 70–99)
GLUCOSE BLDC GLUCOMTR-MCNC: 124 MG/DL (ref 70–99)
GLUCOSE BLDC GLUCOMTR-MCNC: 139 MG/DL (ref 70–99)
GLUCOSE BLDC GLUCOMTR-MCNC: 154 MG/DL (ref 70–99)
GLUCOSE BLDC GLUCOMTR-MCNC: 156 MG/DL (ref 70–99)
HCO3 BLD-SCNC: 24 MMOL/L (ref 21–28)
HCO3 BLD-SCNC: 25 MMOL/L (ref 21–28)
HCO3 BLD-SCNC: 25 MMOL/L (ref 21–28)
HCT VFR BLD AUTO: 40.3 % (ref 35–47)
HGB BLD-MCNC: 13.4 G/DL (ref 11.7–15.7)
LDH SERPL L TO P-CCNC: 396 U/L (ref 81–234)
MCH RBC QN AUTO: 29.9 PG (ref 26.5–33)
MCHC RBC AUTO-ENTMCNC: 33.3 G/DL (ref 31.5–36.5)
MCV RBC AUTO: 90 FL (ref 78–100)
O2/TOTAL GAS SETTING VFR VENT: 55 %
O2/TOTAL GAS SETTING VFR VENT: 55 %
O2/TOTAL GAS SETTING VFR VENT: 90 %
PCO2 BLD: 31 MM HG (ref 35–45)
PCO2 BLD: 33 MM HG (ref 35–45)
PCO2 BLD: 33 MM HG (ref 35–45)
PH BLD: 7.48 [PH] (ref 7.35–7.45)
PH BLD: 7.48 [PH] (ref 7.35–7.45)
PH BLD: 7.5 [PH] (ref 7.35–7.45)
PLATELET # BLD AUTO: 266 10E3/UL (ref 150–450)
PO2 BLD: 68 MM HG (ref 80–105)
PO2 BLD: 75 MM HG (ref 80–105)
PO2 BLD: 77 MM HG (ref 80–105)
POTASSIUM BLD-SCNC: 3.9 MMOL/L (ref 3.4–5.3)
RBC # BLD AUTO: 4.48 10E6/UL (ref 3.8–5.2)
SODIUM SERPL-SCNC: 148 MMOL/L (ref 133–144)
TROPONIN I SERPL HS-MCNC: 4 NG/L
UFH PPP CHRO-ACNC: 0.25 IU/ML
UFH PPP CHRO-ACNC: 0.89 IU/ML
WBC # BLD AUTO: 8.9 10E3/UL (ref 4–11)

## 2022-01-29 PROCEDURE — 85520 HEPARIN ASSAY: CPT | Performed by: PEDIATRICS

## 2022-01-29 PROCEDURE — 999N000157 HC STATISTIC RCP TIME EA 10 MIN

## 2022-01-29 PROCEDURE — 200N000001 HC R&B ICU

## 2022-01-29 PROCEDURE — 82803 BLOOD GASES ANY COMBINATION: CPT | Performed by: PEDIATRICS

## 2022-01-29 PROCEDURE — 83615 LACTATE (LD) (LDH) ENZYME: CPT | Performed by: PEDIATRICS

## 2022-01-29 PROCEDURE — 85027 COMPLETE CBC AUTOMATED: CPT | Performed by: PEDIATRICS

## 2022-01-29 PROCEDURE — 250N000011 HC RX IP 250 OP 636: Performed by: PEDIATRICS

## 2022-01-29 PROCEDURE — 99291 CRITICAL CARE FIRST HOUR: CPT | Performed by: PEDIATRICS

## 2022-01-29 PROCEDURE — 86140 C-REACTIVE PROTEIN: CPT | Performed by: PEDIATRICS

## 2022-01-29 PROCEDURE — 94660 CPAP INITIATION&MGMT: CPT

## 2022-01-29 PROCEDURE — 84484 ASSAY OF TROPONIN QUANT: CPT | Performed by: PEDIATRICS

## 2022-01-29 PROCEDURE — 80048 BASIC METABOLIC PNL TOTAL CA: CPT | Performed by: PEDIATRICS

## 2022-01-29 PROCEDURE — 250N000009 HC RX 250: Performed by: PEDIATRICS

## 2022-01-29 RX ADMIN — LORAZEPAM 0.5 MG: 2 INJECTION INTRAMUSCULAR; INTRAVENOUS at 05:16

## 2022-01-29 RX ADMIN — DEXTRAN 70, GLYCERIN, HYPROMELLOSE 1 DROP: 1; 2; 3 SOLUTION/ DROPS OPHTHALMIC at 20:29

## 2022-01-29 RX ADMIN — DEXAMETHASONE SODIUM PHOSPHATE 6 MG: 10 INJECTION INTRAMUSCULAR; INTRAVENOUS at 13:41

## 2022-01-29 RX ADMIN — HEPARIN SODIUM 1300 UNITS/HR: 10000 INJECTION, SOLUTION INTRAVENOUS at 07:57

## 2022-01-29 RX ADMIN — DEXMEDETOMIDINE HYDROCHLORIDE 0.4 MCG/KG/HR: 4 INJECTION, SOLUTION INTRAVENOUS at 04:00

## 2022-01-29 RX ADMIN — INSULIN ASPART 1 UNITS: 100 INJECTION, SOLUTION INTRAVENOUS; SUBCUTANEOUS at 20:29

## 2022-01-29 RX ADMIN — DEXTRAN 70, GLYCERIN, HYPROMELLOSE 1 DROP: 1; 2; 3 SOLUTION/ DROPS OPHTHALMIC at 13:41

## 2022-01-29 RX ADMIN — DEXTRAN 70, GLYCERIN, HYPROMELLOSE 1 DROP: 1; 2; 3 SOLUTION/ DROPS OPHTHALMIC at 07:55

## 2022-01-29 ASSESSMENT — ACTIVITIES OF DAILY LIVING (ADL)
ADLS_ACUITY_SCORE: 12
ADLS_ACUITY_SCORE: 10
ADLS_ACUITY_SCORE: 12
ADLS_ACUITY_SCORE: 10
ADLS_ACUITY_SCORE: 12
ADLS_ACUITY_SCORE: 12
ADLS_ACUITY_SCORE: 10
ADLS_ACUITY_SCORE: 12
ADLS_ACUITY_SCORE: 10
ADLS_ACUITY_SCORE: 12

## 2022-01-29 ASSESSMENT — MIFFLIN-ST. JEOR: SCORE: 1291.25

## 2022-01-29 NOTE — PROGRESS NOTES
Patient afternoon arterial blood gas was reviewed.  Slight improvement of patient's PF ratio.  Continue current management.  Patient tolerating BiPAP at this time.  Wean FiO2 as tolerated.  Low threshold for intubation.    Brian Solo MD  Critical Care Medicine

## 2022-01-29 NOTE — PROGRESS NOTES
[facility]    Medicine Progress Note - Hospitalist Service    Date of Admission:  1/26/2022    Assessment & Plan      Stacie Hair is a 66 year old female admitted on 1/26/2022 due to concerns for severe COVID-19 pneumonia, bilateral pulmonary emboli, acute hypoxic respiratory failure and systemic inflammatory response syndrome.  With BiPAP and treatment with therapeutic anticoagulation, oxygenation appears to have stabilized over the last 24 hours and may be starting to trend toward slight improvement.  Echocardiogram was abnormal with decreased LVEF 40 to 45% concerning for cardiomyopathy which is a new diagnosis.  A1c was elevated at 6.5 concerning for type 2 diabetes which is also a new diagnosis.  Overall, she remains critically ill with life-threatening respiratory failure.    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    Lymphopenia due to COVID-19 virus    LBBB (left bundle branch block)-chronic    Obesity    Respiratory: Persistent severe hypoxic respiratory failure despite BiPAP, PaO2 to FiO2 ratio 136 today, slightly improved compared with yesterday, respiratory failure likely due to the combination of COVID-19 pneumonia and multiple bilateral pulmonary emboli   -Continue BiPAP, will attempt to transition to brief use of high flow nasal cannula today to allow oral intake if able to maintain oxygen saturations within the target range   -Monitor blood gases   -Reconsider intubation for  mechanical ventilation and prone positioning if worsening, could attempt prone positioning on BiPAP if able   -discontinue Precedex for sedation due to bradycardia and hypothermia, continue Ativan as needed when on BiPAP    Infectious disease: COVID-19 infection with SIRS, no other infection suspected so far, persistent signs of SIRS including hypothermia which may be exacerbated by bradycardia, COVID-19 symptom onset uncertain, positive PCR test on January 26, not previously vaccinated   -Continue dexamethasone   -Actemra administered 1/27    Cardiovascular: Multiple bilateral pulmonary emboli being treated with therapeutic anticoagulation, abnormal echo with nondilated LV but decreased LVEF of 40 to 45% suspected to be acute, suspect cardiomyopathy due to COVID-19 viral infection or stress cardiomyopathy, chronic left bundle branch block and bradycardic coinciding with use of Precedex but no other dysrhythmias, normal RV and estimated right-sided pressures on echocardiogram despite multiple pulmonary emboli, blood pressure stable so far, at risk for volume overload and heart failure but heart failure not suspected so far   -Continuous cardiac monitoring in ICU   -Not adding beta-blocker due to significant resting bradycardia possibly exacerbated by Precedex infusion   -Monitor volume status, avoid excessive fluid resuscitation   -Continue high intensity heparin infusion titrated to Xa target    Endocrine: New diagnosis type 2 diabetes based on hemoglobin A1c 6.5, mildly to moderately hyperglycemic but has not had any nutritional intake   -Continue NovoLog correction scale every 4 hours   -Reconsider adding basal insulin if hyperglycemia worsens necessitating higher doses of NovoLog   -Anticipate additional diabetic education once her clinical status is more stable    FEN: Day 4 hospitalization without nutritional intake and likely had inadequate nutritional intake at home for a period of time prior to  hospitalization due to acute illness, was evaluated by registered dietitian 1/27 who advised monitoring for now, does not have central venous access so far, admitted with hyponatremia that has resolved and hypopotassemia that has resolved but she is now mildly hypernatremic today probably due to free water deficit, hard to assess whether she is symptomatic from hypernatremia due to sedation that she is receiving   -If able to tolerate high flow nasal cannula and maintain adequate oxygenation  will allow water and clear liquid oral intake today   -if unable to tolerate oral intake, discussed enteral feeding via feeding tube versus initiation of parenteral nutrition which will require either placement of a nasogastric feeding tube which can be challenging with BiPAP or placement of central venous catheter for TPN   -If able to tolerate oral intake will allow free water intake and follow serial sodium, if unable to take oral intake will start IV fluids with D5W to replace free water deficit recognizing risks of worsening hyperglycemia and precipitating heart failure     Diet: NPO for Medical/Clinical Reasons Except for: No Exceptions    DVT Prophylaxis: IV heparin infusion  Gomez Catheter: Not present  Central Lines: None  Cardiac Monitoring: ACTIVE order. Indication: ICU  Code Status: Full Code      Disposition Plan   Expected Discharge: 01/31/2022     Anticipated discharge location:  Awaiting care coordination huddle  Delays:     Covid Test Positive  Oxygen Needs            The patient's care was discussed with the Bedside Nurse, Patient and RT.  Total critical care time today so far 36 minutes including time spent reassessing respiratory status and adjusting BiPAP treatment of life-threatening respiratory failure.    Parker Caraballo MD  Hospitalist Service  [facility]  Securely message with the Vocera Web Console (learn more here)  Text page via [employer] Paging/[employer]          Clinically Significant Risk Factors Present on Admission                 ______________________________________________________________________    Interval History   She had no significant events overnight.  However, she became hypothermic this morning and was therefore started on warming blanket.  Heart rate has been bradycardic with heart rate into the 40s even after reducing dose of Precedex infusion.  Blood pressure has been normal to elevated.  She has been using BiPAP continuously sometimes requiring more sedation including doses of IV lorazepam because of restlessness with BiPAP.  Oxygenation was weaned from 60% to 55% overnight on BiPAP, improved from 70% earlier in the day yesterday.  Semi-prone positioning has been attempted.  History from the patient herself is limited at this time because of difficulty communicating with the BiPAP mask and current air leak.  She remains oliguric.    Data reviewed today: I reviewed all medications, new labs and imaging results over the last 24 hours. I personally reviewed cardiac monitor demonstrating sinus rhythm and sinus bradycardia with chronic left bundle branch block but no dysrhythmias.    Physical Exam   Vital Signs: Temp: (!) 94.1  F (34.5  C) (yuridia hugger placed) Temp src: Temporal BP: 120/70 Pulse: 51   Resp: 25 SpO2: 94 % O2 Device: BiPAP/CPAP  BiPAP settings 22/14 with FiO2 55%  BiPAP measurements: , minute ventilation 17, respiratory rates ranging from normal to 28, oxygen saturations 95 to 98% with FiO2 55%  Patient Vitals for the past 24 hrs:   BP Temp Temp src Pulse Resp SpO2 Weight   01/29/22 0900 127/76 (!) 95.6  F (35.3  C) Rectal (!) 48 18 94 % --   01/29/22 0800 120/70 -- -- 51 25 94 % --   01/29/22 0700 (!) 144/86 (!) 94.1  F (34.5  C) Temporal (!) 45 24 96 % --   01/29/22 0600 137/82 97  F (36.1  C) Axillary 50 13 95 % 79.3 kg (174 lb 13.2 oz)   01/29/22 0500 (!) 141/83 -- -- 52 11 95 % --   01/29/22 0400 (!) 147/89 97.4  F (36.3  C)  Temporal 58 (!) 0 97 % --   01/29/22 0300 (!) 146/85 -- -- 50 11 96 % --   01/29/22 0200 (!) 146/89 -- -- 56 26 96 % --   01/29/22 0100 (!) 140/86 -- -- 56 15 96 % --   01/29/22 0000 136/81 97  F (36.1  C) Temporal 51 8 96 % --   01/28/22 2300 (!) 142/84 -- -- 54 22 97 % --   01/28/22 2200 111/73 -- -- 56 28 95 % --   01/28/22 2100 113/69 -- -- 52 24 96 % --   01/28/22 2000 104/69 97.4  F (36.3  C) Temporal 52 27 95 % --   01/28/22 1900 123/73 -- -- 61 25 98 % --   01/28/22 1626 -- 98  F (36.7  C) Temporal 61 10 96 % --   01/28/22 1600 115/76 98.4  F (36.9  C) -- 53 30 96 % --   01/28/22 1500 128/71 98.2  F (36.8  C) -- 59 28 -- --   01/28/22 1400 114/79 98.1  F (36.7  C) -- 63 16 93 % --   01/28/22 1300 116/75 97.5  F (36.4  C) -- 60 30 94 % --   01/28/22 1200 116/73 97.2  F (36.2  C) -- 55 29 92 % --   01/28/22 1100 136/81 97  F (36.1  C) Rectal 50 25 95 % --   01/28/22 1000 138/81 97.3  F (36.3  C) -- 52 (!) 31 94 % --       Weight: 174 lbs 13.2 oz   Vitals:    01/26/22 1051 01/26/22 1615 01/27/22 0400 01/28/22 0600   Weight: 79.4 kg (175 lb) 80.3 kg (177 lb) 79.2 kg (174 lb 9.6 oz) 80.5 kg (177 lb 6.4 oz)    01/29/22 0600   Weight: 79.3 kg (174 lb 13.2 oz)       Intake/Output Summary (Last 24 hours) at 1/29/2022 0918  Last data filed at 1/29/2022 0600  Gross per 24 hour   Intake 275.3 ml   Output 750 ml   Net -474.7 ml     Cumulative I/O 0.6L positive for hospitalization    General Appearance: Somnolent without signs of acute distress while on BiPAP supine in bed  Respiratory: Symmetric breath sounds with clear lung fields  Cardiovascular: Bradycardic with regular rhythm, palpable radial pulse with brisk capillary refill in the hands and feet, no peripheral edema  GI: Hypoactive bowel sounds, soft abdomen without apparent tenderness  Skin: No rash, normal color  Other: Opens eyes to voice and appears to nod and shake head appropriately, attempts to vocalize responsively but speech is difficult to understand  because of BiPAP mask    Data   Recent Labs   Lab 01/29/22  0741 01/29/22  0607 01/29/22  0605 01/29/22  0359 01/28/22  0832 01/28/22  0508 01/27/22  0824 01/27/22  0518 01/26/22  1059 01/26/22  1058   WBC  --  8.9  --   --   --  11.6*  --  13.0*   < >  --    HGB  --  13.4  --   --   --  14.1  --  13.3   < >  --    MCV  --  90  --   --   --  88  --  80   < >  --    PLT  --  266  --   --   --  275  --  225   < >  --    INR  --   --   --   --   --   --   --   --   --  1.09   NA  --   --  148*  --   --  141  --  136  --  131*   POTASSIUM  --   --  3.9  --   --  4.1  --  4.4  --  3.0*   CHLORIDE  --   --  118*  --   --  109  --  104  --  95   CO2  --   --  25  --   --  24  --  25  --  27   BUN  --   --  43*  --   --  47*  --  37*  --  26   CR  --   --  0.67  --   --  0.70  --  0.73  --  0.67   ANIONGAP  --   --  5  --   --  8  --  7  --  9   FATUMA  --   --  8.7  --   --  9.0  --  8.8  --  8.5   *  --  145* 139*   < > 156*   < > 159*   < > 134*   ALBUMIN  --   --   --   --   --   --   --   --   --  2.1*   PROTTOTAL  --   --   --   --   --   --   --   --   --  8.0   BILITOTAL  --   --   --   --   --   --   --   --   --  1.2   ALKPHOS  --   --   --   --   --   --   --   --   --  142   ALT  --   --   --   --   --   --   --   --   --  58*   AST  --   --   --   --   --   --   --   --   --  60*    < > = values in this interval not displayed.     Blood sugars ranged 124-162 over the last day  , improved from 212  , improved from 459  Normal troponin for  Blood cultures have been negative for 2 days    Recent Labs   Lab 01/29/22  0606 01/28/22  1814 01/28/22  0834 01/27/22  1557   PH 7.48* 7.48* 7.50* 7.45   PCO2 33* 34* 34* 37   PO2 75* 87 76* 97   HCO3 24 25 26 25   O2PER 55 60 60 60     PaO2 to FiO2 ratio 136 today, improved from 127 yesterday    Medications     dexmedetomidine 0.4 mcg/kg/hr (01/29/22 0400)     heparin 1,300 Units/hr (01/29/22 6243)     - MEDICATION INSTRUCTIONS -         artificial tears  ophthalmic solution  1 drop Both Eyes TID     dexamethasone  6 mg Intravenous Q24H     insulin aspart  1-6 Units Subcutaneous Q4H

## 2022-01-29 NOTE — PROGRESS NOTES
S: RT Note  B: Covid+  A: Pt able to be on HF for an extended amount of time today. Back on bipap for night. ABGs as follows  Recent Labs   Lab 01/29/22  1231 01/29/22  0606 01/28/22  1814 01/28/22  0834   PH 7.50* 7.48* 7.48* 7.50*   PCO2 31* 33* 34* 34*   PO2 68* 75* 87 76*   HCO3 25 24 25 26   O2PER 90 55 60 60       R: RT to follow

## 2022-01-29 NOTE — PLAN OF CARE
Major Shift Events:  no acute events this shift. Patient now on HFNC at 90%. Precedex drip discontinued. Heparin drip stopped per protocol. Patient denies pain or discomfort.     Plan: Continue to monitor respiratory status.   For vital signs and complete assessments, please see documentation flowsheets.     Meri Wolff RN

## 2022-01-29 NOTE — PROGRESS NOTES
BiPAP 22/4, 55%. ABG worse this morning. . Slightly hypothermic, yuridia hugger applied overnight and removed this morning as patient stated she was getting too hot. SB/SR with LBBB. Alert, but withdrawn. PRN ativan x2, precedex gtt for restlessness and difficulty sleeping. Adjusted for bradycardia. Heparin paused for 1 hour overnight, restarted at 2300 at 1150 unit(s)/hr. Bleeding precautions reviewed. Slightly low uop, 450 mL overnight. No BM. -160s, correction as appropriate. Hep/anti Xa pending results. Continue poc.

## 2022-01-30 LAB
ANION GAP SERPL CALCULATED.3IONS-SCNC: 5 MMOL/L (ref 3–14)
BASE EXCESS BLDA CALC-SCNC: -0.3 MMOL/L (ref -9–1.8)
BASE EXCESS BLDA CALC-SCNC: 0.9 MMOL/L (ref -9–1.8)
BASE EXCESS BLDA CALC-SCNC: 1 MMOL/L (ref -9–1.8)
BUN SERPL-MCNC: 34 MG/DL (ref 7–30)
CALCIUM SERPL-MCNC: 8.7 MG/DL (ref 8.5–10.1)
CHLORIDE BLD-SCNC: 120 MMOL/L (ref 94–109)
CO2 SERPL-SCNC: 23 MMOL/L (ref 20–32)
CREAT SERPL-MCNC: 0.6 MG/DL (ref 0.52–1.04)
CRP SERPL-MCNC: 61.5 MG/L (ref 0–8)
GFR SERPL CREATININE-BSD FRML MDRD: >90 ML/MIN/1.73M2
GLUCOSE BLD-MCNC: 109 MG/DL (ref 70–99)
GLUCOSE BLDC GLUCOMTR-MCNC: 101 MG/DL (ref 70–99)
GLUCOSE BLDC GLUCOMTR-MCNC: 108 MG/DL (ref 70–99)
GLUCOSE BLDC GLUCOMTR-MCNC: 117 MG/DL (ref 70–99)
GLUCOSE BLDC GLUCOMTR-MCNC: 88 MG/DL (ref 70–99)
GLUCOSE BLDC GLUCOMTR-MCNC: 92 MG/DL (ref 70–99)
GLUCOSE BLDC GLUCOMTR-MCNC: 96 MG/DL (ref 70–99)
HCO3 BLD-SCNC: 22 MMOL/L (ref 21–28)
HCO3 BLD-SCNC: 24 MMOL/L (ref 21–28)
HCO3 BLD-SCNC: 24 MMOL/L (ref 21–28)
HOLD SPECIMEN: NORMAL
LDH SERPL L TO P-CCNC: 408 U/L (ref 81–234)
O2/TOTAL GAS SETTING VFR VENT: 50 %
O2/TOTAL GAS SETTING VFR VENT: 60 %
O2/TOTAL GAS SETTING VFR VENT: 70 %
PCO2 BLD: 30 MM HG (ref 35–45)
PCO2 BLD: 32 MM HG (ref 35–45)
PCO2 BLD: 34 MM HG (ref 35–45)
PH BLD: 7.46 [PH] (ref 7.35–7.45)
PH BLD: 7.48 [PH] (ref 7.35–7.45)
PH BLD: 7.48 [PH] (ref 7.35–7.45)
PO2 BLD: 62 MM HG (ref 80–105)
PO2 BLD: 69 MM HG (ref 80–105)
PO2 BLD: 73 MM HG (ref 80–105)
POTASSIUM BLD-SCNC: 3.8 MMOL/L (ref 3.4–5.3)
SODIUM SERPL-SCNC: 148 MMOL/L (ref 133–144)
UFH PPP CHRO-ACNC: 0.51 IU/ML
UFH PPP CHRO-ACNC: 0.8 IU/ML

## 2022-01-30 PROCEDURE — 999N000157 HC STATISTIC RCP TIME EA 10 MIN

## 2022-01-30 PROCEDURE — 82803 BLOOD GASES ANY COMBINATION: CPT | Performed by: PEDIATRICS

## 2022-01-30 PROCEDURE — 200N000001 HC R&B ICU

## 2022-01-30 PROCEDURE — 83615 LACTATE (LD) (LDH) ENZYME: CPT | Performed by: PEDIATRICS

## 2022-01-30 PROCEDURE — 250N000013 HC RX MED GY IP 250 OP 250 PS 637: Performed by: PEDIATRICS

## 2022-01-30 PROCEDURE — 250N000011 HC RX IP 250 OP 636: Performed by: PEDIATRICS

## 2022-01-30 PROCEDURE — 85520 HEPARIN ASSAY: CPT | Performed by: PEDIATRICS

## 2022-01-30 PROCEDURE — 94660 CPAP INITIATION&MGMT: CPT

## 2022-01-30 PROCEDURE — 99291 CRITICAL CARE FIRST HOUR: CPT | Performed by: PEDIATRICS

## 2022-01-30 PROCEDURE — 86140 C-REACTIVE PROTEIN: CPT | Performed by: PEDIATRICS

## 2022-01-30 PROCEDURE — 80048 BASIC METABOLIC PNL TOTAL CA: CPT | Performed by: PEDIATRICS

## 2022-01-30 RX ORDER — HEPARIN SODIUM 10000 [USP'U]/100ML
0-5000 INJECTION, SOLUTION INTRAVENOUS CONTINUOUS
Status: ACTIVE | OUTPATIENT
Start: 2022-01-30 | End: 2022-01-30

## 2022-01-30 RX ORDER — LORAZEPAM 0.5 MG/1
0.5 TABLET ORAL EVERY 4 HOURS PRN
Status: DISCONTINUED | OUTPATIENT
Start: 2022-01-30 | End: 2022-02-07 | Stop reason: HOSPADM

## 2022-01-30 RX ADMIN — DEXTRAN 70, GLYCERIN, HYPROMELLOSE 1 DROP: 1; 2; 3 SOLUTION/ DROPS OPHTHALMIC at 16:14

## 2022-01-30 RX ADMIN — DEXTRAN 70, GLYCERIN, HYPROMELLOSE 1 DROP: 1; 2; 3 SOLUTION/ DROPS OPHTHALMIC at 20:11

## 2022-01-30 RX ADMIN — HEPARIN SODIUM 1100 UNITS/HR: 10000 INJECTION, SOLUTION INTRAVENOUS at 04:38

## 2022-01-30 RX ADMIN — APIXABAN 10 MG: 5 TABLET, FILM COATED ORAL at 20:11

## 2022-01-30 RX ADMIN — DEXTRAN 70, GLYCERIN, HYPROMELLOSE 1 DROP: 1; 2; 3 SOLUTION/ DROPS OPHTHALMIC at 09:43

## 2022-01-30 ASSESSMENT — ACTIVITIES OF DAILY LIVING (ADL)
ADLS_ACUITY_SCORE: 10
ADLS_ACUITY_SCORE: 8
ADLS_ACUITY_SCORE: 10
ADLS_ACUITY_SCORE: 8
ADLS_ACUITY_SCORE: 10
ADLS_ACUITY_SCORE: 8
ADLS_ACUITY_SCORE: 10
ADLS_ACUITY_SCORE: 8
ADLS_ACUITY_SCORE: 8
ADLS_ACUITY_SCORE: 10

## 2022-01-30 ASSESSMENT — MIFFLIN-ST. JEOR: SCORE: 1294.31

## 2022-01-30 NOTE — PROVIDER NOTIFICATION
S-(situation): Arterial line not functioning. Due for heparin Xa    B-(background): +Covid, PE    A-(assessment): Only able to get flash of blood, was able to draw ABG, not able to fill tube for Xa despite repositioning and flushing. Spoke with MD, plan is to initiate Eliquis at 2100.    R-(recommendations): Keep heparin gtt at current rate until Eliquis is given and then discontinue per Dr. Caraballo. Also called in Anesthesia as provider wants line replaced for ABG monitoring.      Stevie Joyce RN

## 2022-01-30 NOTE — PROGRESS NOTES
BiPAP 22/14, 50%. . Patient having a hard time keeping the mask up past her chin, requiring frequent adjustment, refusing full face device. Did not sleep, mouth swabs for dryness. Refused ativan, as it made her more anxious without precedex adjunct. Heparin paused and readjusted, recheck Xa 0900. Electrolytes wnl. AO. Afebrile. SR c/ LBBB. Continue poc.

## 2022-01-30 NOTE — PROGRESS NOTES
[facility]    Medicine Progress Note - Hospitalist Service    Date of Admission:  1/26/2022    Assessment & Plan             Stacie Hair is a 66 year old female admitted on 1/26/2022 due to concerns for severe COVID-19 pneumonia, bilateral pulmonary emboli, acute hypoxic respiratory failure and systemic inflammatory response syndrome.  With BiPAP and treatment with therapeutic anticoagulation, oxygenation appears to have stabilized over the last 2 days and is trending toward improvement.  Echocardiogram was abnormal with decreased LVEF 40 to 45% concerning for cardiomyopathy which is a new diagnosis.  A1c was elevated at 6.5 concerning for type 2 diabetes which is also a new diagnosis.  Overall, she remains critically ill with life-threatening respiratory failure.    Principal Problem:    Pneumonia due to 2019 novel coronavirus  Active Problems:    Acute respiratory failure with hypoxia (H)    Multiple subsegmental pulmonary emboli without acute cor pulmonale (H)    SIRS (systemic inflammatory response syndrome) (H)-tachycardia, tachypnea, leukocytosis, hypothermia    New onset type 2 diabetes mellitus (H)-A1c 6.5    Cardiomyopathy (H)-LV EF 40-45%    Hypernatremia    Hypertension    Hyponatremia-admit Na 131    Hypopotassemia-admit K 3.0    Abnormal transaminases    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    Lymphopenia due to COVID-19 virus    LBBB (left bundle branch block)-chronic    Obesity    Respiratory: Persistent severe hypoxic respiratory failure now with improving PaO2 to FiO2 ratio which is 146 today, respiratory failure likely due to the combination of COVID-19 pneumonia and multiple bilateral pulmonary emboli, not tolerating BiPAP well after Precedex was discontinued, now more comfortable off BiPAP and maintaining adequate oxygenation without worsening work of breathing after transition to high flow nasal cannula this morning   -Continue high flow nasal  cannula titrating flow rate and oxygen to keep saturations 90 to 96% and to allow more oral intake as long as she does not develop worsening work of breathing or hypoxia   -Use BiPAP as needed for worsening work of breathing or hypoxia despite high flow nasal cannula   -Monitor blood gases   -Precedex was discontinued due to significant bradycardia and hypothermia both of which resolved after stopping Precedex, continue Ativan as needed for tolerating BiPAP   -May advance activity to out of bed in chair today as tolerated    Infectious disease: COVID-19 symptom onset uncertain, positive PCR test on January 26, not previously vaccinated, admitted with COVID-19 pneumonia with SIRS, no other infection suspected so far, resolved signs of SIRS    -Continue dexamethasone day 5 today   -Actemra administered 1/27    Cardiovascular: Multiple bilateral pulmonary emboli being treated with therapeutic anticoagulation, abnormal echo with nondilated LV but decreased LVEF of 40 to 45% suspected to be acute, at risk for volume overload and heart failure but heart failure not suspected so far, suspect cardiomyopathy due to COVID-19 viral infection and/or stress cardiomyopathy but normal troponins argue against active myocarditis, chronic left bundle branch block, bradycardia coincided with use of Precedex and resolved after stopping Precedex, no other dysrhythmias, normal RV and estimated right-sided pressures on echocardiogram despite multiple pulmonary emboli, blood pressure stable   -Continuous cardiac monitoring in ICU   -Have not added beta-blocker so far due to previous significant bradycardia that is just resolving in the last 24 hours after stopping Precedex yesterday   -Monitor volume status, avoiding excessive IV fluid resuscitation   -Continue high intensity heparin infusion titrated to Xa target, may switch to oral anticoagulant such as Eliquis late today if able to tolerate advancing diet    Endocrine: New diagnosis  type 2 diabetes based on hemoglobin A1c 6.5, mildly to moderately hyperglycemic but has not yet had significant nutritional intake, tolerating transition off BiPAP to high flow nasal cannula well so far today   -Advance to diabetic diet today   -Switch blood sugar monitoring to before meals and bedtime   -Switch NovoLog correction scale to before meals and bedtime   -Reconsider adding basal insulin if hyperglycemia worsens necessitating higher doses of NovoLog   -Anticipate additional diabetic education including nutritional education requested by the patient once her clinical status is more stable    FEN: Day 5 hospitalization without significant nutritional intake and likely had inadequate nutritional intake at home for a period of time prior to hospitalization due to acute illness, was evaluated by registered dietitian 1/27 who advised monitoring for now, does not have central venous access, admitted with hyponatremia that has resolved and hypopotassemia that has resolved, now mildly hypernatremic over the last 2 days due to free water deficit, not overtly symptomatic from hypernatremia   -Advance oral diet today and encourage oral fluid intake   -follow serial sodium, reconsider adding low rate IV fluids with D5W to replace free water deficit if she is not able to tolerate adequate oral intake and or hyponatremia worsens although IV fluids with D5 will increase risks of worsening hyperglycemia and heart failure       Diet: NPO for Medical/Clinical Reasons Except for: Ice Chips, Other; Specify: may have clear liquids only when on HFNC, strict NPO when on BiPAP    DVT Prophylaxis: IV heparin  Gomez Catheter: Not present  Central Lines: None  Cardiac Monitoring: ACTIVE order. Indication: ICU  Code Status: Full Code      Disposition Plan   Expected Discharge: 02/04/2022     Anticipated discharge location:  Awaiting care coordination huddle  Delays:     Covid Test Positive  Oxygen Needs            The patient's care  was discussed with the Bedside Nurse and Patient.  Total critical care time today 53 minutes including time spent reassessing respiratory status and adjusting BiPAP treatment of potentially life-threatening respiratory failure.    Parker Caraballo MD  Hospitalist Service  [facility]  Securely message with the Vocera Web Console (learn more here)  Text page via [facility] Paging/Directory         Clinically Significant Risk Factors Present on Admission                 ______________________________________________________________________    Interval History   Patient says she could not sleep well last night because of the BiPAP mask.  The airflow dries her nose, mouth, and eyes which is very uncomfortable for her.  She tried different BiPAP masks which did not seem to help.  She did not receive Precedex sedation last night because it was stopped yesterday due to bradycardia and hypothermia.  Nursing reports that she declined doses of Ativan for sedation apparently because patient was concerned Ativan made her more anxious.  She is more comfortable now this morning after transition to high flow nasal cannula.  She would prefer to use high flow nasal cannula as much as she can rather than BiPAP.  She has been drinking liquids without difficulty.  She wonders whether she can eat today.  She feels like she is breathing comfortably with the high flow nasal cannula.  She has no new concerns.  She remains afebrile with resolution of hypothermia after discontinuation of Precedex.  Blood pressure remains stable.  Moderate to severe bradycardia resolved after stopping Precedex yesterday.  Oxygenation improved yesterday and overnight with decreasing FiO2 on BiPAP.  She remains oliguric.  She has not had a bowel movement in the hospital but has not had any significant oral intake in the hospital and had not been eating well at home before hospitalization.    Data reviewed today: I reviewed all  medications, new labs and imaging results over the last 24 hours. I personally reviewed cardiac monitor which has not demonstrated any dysrhythmias in the last day.    Physical Exam   Vital Signs: Temp: 97.5  F (36.4  C) Temp src: Oral BP: (!) 158/78 Pulse: 80   Resp: 17 SpO2: 94 % O2 Device: High Flow Nasal Cannula (HFNC) Oxygen Delivery: 35 LPM   Current FiO2 70% on high flow nasal cannula   While on BiPAP 22/14 her FiO2 was weaned to 50% overnight, BiPAP measurements were , minute ventilation 12, respiratory rate fluctuating from normal to 27, and oxygen saturations 94 to 97%    Weight: 175 lbs 8 oz   Vitals:    01/26/22 1615 01/27/22 0400 01/28/22 0600 01/29/22 0600   Weight: 80.3 kg (177 lb) 79.2 kg (174 lb 9.6 oz) 80.5 kg (177 lb 6.4 oz) 79.3 kg (174 lb 13.2 oz)    01/30/22 0400   Weight: 79.6 kg (175 lb 8 oz)       Intake/Output Summary (Last 24 hours) at 1/30/2022 0845  Last data filed at 1/30/2022 0400  Gross per 24 hour   Intake 693 ml   Output 600 ml   Net 93 ml     Cumulative I/O 0.7L positive for hospitalization    General Appearance: Presently appears to be breathing comfortably on high flow nasal cannula, able to speak full sentences, voice is soft  Respiratory: Normal respiratory effort, symmetric breath sounds with clear lung fields  Cardiovascular: Regular rate and rhythm, brisk capillary refill, no peripheral edema  GI: Hypoactive bowel sounds, nondistended abdomen, mildly obese, soft, nontender  Skin: No bleeding, drainage, or skin erythema surrounding arterial line skin entry site, no rashes  Other: Alert and maintains wakefulness and attention, answering questions appropriately, no confusion evident, able to purposefully and symmetrically move limbs    Data   Recent Labs   Lab 01/30/22  0744 01/30/22  0401 01/30/22  0358 01/29/22  0741 01/29/22  0607 01/29/22  0605 01/28/22  0832 01/28/22  0508 01/27/22  0824 01/27/22  0518 01/26/22  1059 01/26/22  1058   WBC  --   --   --   --  8.9  --    --  11.6*  --  13.0*   < >  --    HGB  --   --   --   --  13.4  --   --  14.1  --  13.3   < >  --    MCV  --   --   --   --  90  --   --  88  --  80   < >  --    PLT  --   --   --   --  266  --   --  275  --  225   < >  --    INR  --   --   --   --   --   --   --   --   --   --   --  1.09   NA  --   --  148*  --   --  148*  --  141  --  136  --  131*   POTASSIUM  --   --  3.8  --   --  3.9  --  4.1  --  4.4  --  3.0*   CHLORIDE  --   --  120*  --   --  118*  --  109  --  104  --  95   CO2  --   --  23  --   --  25  --  24  --  25  --  27   BUN  --   --  34*  --   --  43*  --  47*  --  37*  --  26   CR  --   --  0.60  --   --  0.67  --  0.70  --  0.73  --  0.67   ANIONGAP  --   --  5  --   --  5  --  8  --  7  --  9   FATUMA  --   --  8.7  --   --  8.7  --  9.0  --  8.8  --  8.5   GLC 96 108* 109*   < >  --  145*   < > 156*   < > 159*   < > 134*   ALBUMIN  --   --   --   --   --   --   --   --   --   --   --  2.1*   PROTTOTAL  --   --   --   --   --   --   --   --   --   --   --  8.0   BILITOTAL  --   --   --   --   --   --   --   --   --   --   --  1.2   ALKPHOS  --   --   --   --   --   --   --   --   --   --   --  142   ALT  --   --   --   --   --   --   --   --   --   --   --  58*   AST  --   --   --   --   --   --   --   --   --   --   --  60*    < > = values in this interval not displayed.     Blood sugars ranged  over the last day  CRP 61.5, improved from 116  , increased from 396  Xa level about midnight was 0.8, above the therapeutic range  Blood culture from January 26 is negative    Recent Labs   Lab 01/30/22  0358 01/29/22  1815 01/29/22  1231 01/29/22  0606   PH 7.46* 7.48* 7.50* 7.48*   PCO2 34* 33* 31* 33*   PO2 73* 77* 68* 75*   HCO3 24 25 25 24   O2PER 50 55 90 55     PaO2 to FiO2 ratio 146 today    Medications     heparin 1,100 Units/hr (01/30/22 4365)     - MEDICATION INSTRUCTIONS -         artificial tears ophthalmic solution  1 drop Both Eyes TID     dexamethasone  6 mg Intravenous Q24H      insulin aspart  1-6 Units Subcutaneous Q4H

## 2022-01-31 ENCOUNTER — APPOINTMENT (OUTPATIENT)
Dept: OCCUPATIONAL THERAPY | Facility: CLINIC | Age: 67
End: 2022-01-31
Payer: COMMERCIAL

## 2022-01-31 LAB
ANION GAP SERPL CALCULATED.3IONS-SCNC: 8 MMOL/L (ref 3–14)
BACTERIA BLD CULT: NO GROWTH
BASE EXCESS BLDA CALC-SCNC: 0 MMOL/L (ref -9–1.8)
BUN SERPL-MCNC: 23 MG/DL (ref 7–30)
CALCIUM SERPL-MCNC: 8.7 MG/DL (ref 8.5–10.1)
CHLORIDE BLD-SCNC: 114 MMOL/L (ref 94–109)
CO2 SERPL-SCNC: 22 MMOL/L (ref 20–32)
CREAT SERPL-MCNC: 0.63 MG/DL (ref 0.52–1.04)
GFR SERPL CREATININE-BSD FRML MDRD: >90 ML/MIN/1.73M2
GLUCOSE BLD-MCNC: 95 MG/DL (ref 70–99)
GLUCOSE BLDC GLUCOMTR-MCNC: 107 MG/DL (ref 70–99)
GLUCOSE BLDC GLUCOMTR-MCNC: 125 MG/DL (ref 70–99)
GLUCOSE BLDC GLUCOMTR-MCNC: 143 MG/DL (ref 70–99)
GLUCOSE BLDC GLUCOMTR-MCNC: 89 MG/DL (ref 70–99)
HCO3 BLD-SCNC: 23 MMOL/L (ref 21–28)
MAGNESIUM SERPL-MCNC: 2.4 MG/DL (ref 1.6–2.3)
O2/TOTAL GAS SETTING VFR VENT: 70 %
PCO2 BLD: 33 MM HG (ref 35–45)
PH BLD: 7.46 [PH] (ref 7.35–7.45)
PHOSPHATE SERPL-MCNC: 3.7 MG/DL (ref 2.5–4.5)
PO2 BLD: 78 MM HG (ref 80–105)
POTASSIUM BLD-SCNC: 3.5 MMOL/L (ref 3.4–5.3)
SODIUM SERPL-SCNC: 144 MMOL/L (ref 133–144)

## 2022-01-31 PROCEDURE — 250N000013 HC RX MED GY IP 250 OP 250 PS 637: Performed by: FAMILY MEDICINE

## 2022-01-31 PROCEDURE — 250N000013 HC RX MED GY IP 250 OP 250 PS 637: Performed by: PEDIATRICS

## 2022-01-31 PROCEDURE — 99207 PR CDG-MDM COMPONENT: MEETS HIGH - UP CODED: CPT | Performed by: FAMILY MEDICINE

## 2022-01-31 PROCEDURE — 84100 ASSAY OF PHOSPHORUS: CPT | Performed by: PEDIATRICS

## 2022-01-31 PROCEDURE — 82310 ASSAY OF CALCIUM: CPT | Performed by: PEDIATRICS

## 2022-01-31 PROCEDURE — 120N000001 HC R&B MED SURG/OB

## 2022-01-31 PROCEDURE — 250N000011 HC RX IP 250 OP 636: Performed by: FAMILY MEDICINE

## 2022-01-31 PROCEDURE — 999N000215 HC STATISTIC HFNC ADULT NON-CPAP

## 2022-01-31 PROCEDURE — 97535 SELF CARE MNGMENT TRAINING: CPT | Mod: GO | Performed by: OCCUPATIONAL THERAPIST

## 2022-01-31 PROCEDURE — 97165 OT EVAL LOW COMPLEX 30 MIN: CPT | Mod: GO | Performed by: OCCUPATIONAL THERAPIST

## 2022-01-31 PROCEDURE — 83735 ASSAY OF MAGNESIUM: CPT | Performed by: PEDIATRICS

## 2022-01-31 PROCEDURE — 99233 SBSQ HOSP IP/OBS HIGH 50: CPT | Performed by: FAMILY MEDICINE

## 2022-01-31 PROCEDURE — 82803 BLOOD GASES ANY COMBINATION: CPT | Performed by: PEDIATRICS

## 2022-01-31 RX ADMIN — DEXTRAN 70, GLYCERIN, HYPROMELLOSE 1 DROP: 1; 2; 3 SOLUTION/ DROPS OPHTHALMIC at 21:00

## 2022-01-31 RX ADMIN — DEXAMETHASONE 6 MG: 4 TABLET ORAL at 14:38

## 2022-01-31 RX ADMIN — DEXTRAN 70, GLYCERIN, HYPROMELLOSE 1 DROP: 1; 2; 3 SOLUTION/ DROPS OPHTHALMIC at 14:40

## 2022-01-31 RX ADMIN — MINERAL OIL, PETROLATUM, PHENYLEPHRINE HCL: 2.5; 140; 749 OINTMENT TOPICAL at 14:49

## 2022-01-31 RX ADMIN — DEXTRAN 70, GLYCERIN, HYPROMELLOSE 1 DROP: 1; 2; 3 SOLUTION/ DROPS OPHTHALMIC at 09:13

## 2022-01-31 RX ADMIN — APIXABAN 10 MG: 5 TABLET, FILM COATED ORAL at 09:12

## 2022-01-31 RX ADMIN — APIXABAN 10 MG: 5 TABLET, FILM COATED ORAL at 21:01

## 2022-01-31 ASSESSMENT — ACTIVITIES OF DAILY LIVING (ADL)
ADLS_ACUITY_SCORE: 10
ADLS_ACUITY_SCORE: 9
ADLS_ACUITY_SCORE: 10
ADLS_ACUITY_SCORE: 9
ADLS_ACUITY_SCORE: 10
PREVIOUS_RESPONSIBILITIES: MEAL PREP;HOUSEKEEPING;LAUNDRY;SHOPPING;YARDWORK;MEDICATION MANAGEMENT;FINANCES;DRIVING
ADLS_ACUITY_SCORE: 10
ADLS_ACUITY_SCORE: 10
ADLS_ACUITY_SCORE: 9
ADLS_ACUITY_SCORE: 10
ADLS_ACUITY_SCORE: 10
ADLS_ACUITY_SCORE: 9

## 2022-01-31 NOTE — ANESTHESIA PROCEDURE NOTES
Arterial Line Procedure Note    Pre-Procedure   Staff -        CRNA: Jose Matos APRN CRNA       Performed By: CRNA       Location: ICU       Procedure Start/Stop Times: 1/30/2022 7:02 AM and 1/30/2022 7:56 AM       Pre-Anesthestic Checklist: patient identified, risks and benefits discussed, informed consent, monitors and equipment checked and pre-op evaluation  Timeout:       Correct Patient: Yes        Correct Procedure: Yes        Correct Site: Yes        Correct Position: Yes   Procedure   Procedure: arterial line and new line       Laterality: right       Insertion Site: radial.  Sterile Prep        All elements of maximal sterile barrier technique followed       Patient Prep/Sterile Barriers: hand hygiene, sterile gloves, hat, mask, draped, sterile gown, patient draped, draped       Skin prep: Chloraprep  Insertion/Injection        Technique: ultrasound guided and Seldinger Technique        1. Ultrasound was used to evaluate the access site.       2. Artery evaluated via ultrasound for patency/adequacy.       3. Using real-time ultrasound the needle/catheter was observed entering the artery/vein.       5. The visualized structures were anatomically normal.       6. There were no apparent abnormal pathologic findings.       Catheter size: 20 Ga, 15cm.  Narrative         Secured by: suture       Tegaderm and Biopatch dressing used.       Complications: None apparent,        Arterial waveform: Yes        IBP within 10% of NIBP: Yes   Comments:  2 attempts on the left radial artery. Wire difficult to pass. Switched right radial artery with pulsatile blood flow and the wire passed with ease.

## 2022-01-31 NOTE — PROGRESS NOTES
Patient complains of left thigh numbness today.  Upper thigh, good pedal pulses bilaterally, can wiggle toes, can move leg, does not seem to be any weakness when transferring to commode and recliner chair at bedside. Provider is aware. Did discuss with patient today about OT being ordered to help her with energy conservation ideas.  Patient slow to respond at times (Covid brain fog perhaps) but does answer questions and orientation questions appropriately, follows commands, able to let needs be known. With provider during rounds this morning, patient may be a candidate to move out of ICU today as she remains stable on high flow nasal cannula 70% 40 liters flow.

## 2022-01-31 NOTE — PLAN OF CARE
Occupational Therapy Discharge Summary    Reason for therapy discharge:    All goals and outcomes met, no further needs identified.    Progress towards therapy goal(s). See goals on Care Plan in Epic electronic health record for goal details.  Goals met    Therapy recommendation(s):    Continued therapy is recommended.  Rationale/Recommendations:  Patient may benefit from OP OT and OP MN following hospitalization to progress endurance and provide strategies for energy conservation and fatigue management if having difficulty progressing to baseline IND.      Patient seen to provide covid-19 education materials including energy conservation strategies, pursed lip breathing and gradual return to daily activity. Anticipate as patient improves from medical standpoint, functional independence and strength will also improve without further skilled intervention. Continue to encourage patient to sit in chair and up to bathroom throughout the day.     Please re-consult if noting decreased IND in ADLs, functional mobility, or covid brain fog not improving as clinical status improves.     Thank you for the referral,    ADELAIDA Aguillon, OTR/L  [facility] - Occupational Therapy    Phone: [phone]  Email: [email or web address]

## 2022-01-31 NOTE — PLAN OF CARE
Problem: Adult Inpatient Plan of Care  Goal: Plan of Care Review  Outcome: Improving  Flowsheets  Taken 1/31/2022 1714 by Tawanna Flor RN  Progress: improving  Taken 1/28/2022 0534 by Elizabeth Guzman RN  Plan of Care Reviewed With: patient     Problem: Adult Inpatient Plan of Care  Goal: Patient-Specific Goal (Individualized)  Outcome: Improving  Flowsheets (Taken 1/31/2022 1714)  Patient-Specific Goals (Include Timeframe): patient will continue to progress with needing less oxygen support on HFNC, switching to nasal cannula prior to discharge   Patient has tolerated HFNC oxygen today, weaning down from 70% 40 liters to 50% 35 liters.  Arterial line discontinued, telemetry discontinued. Patient complains of left upper thigh numbness today but is able to move leg and has good CMS to left extremity. Transfers slowly (history of chronic vertigo for about 2 years), but stable and steady on her feet when transferring between bed to commode and recliner chair. Complaints of hemorrhoids, preparation H obtained from pharmacy and applied to anal area. Slow to respond verbally, but answers appropriately. Arterial line removed and telemetry discontinued in preparation for moving out of ICU this evening to med/surg, although has been med/surg status since about 1310.

## 2022-01-31 NOTE — PROGRESS NOTES
S: RT Note  B: Covid+  A: Pt able to tolerate HF all day. ABGs as follows  Recent Labs   Lab 01/30/22  1712 01/30/22  1156 01/30/22  0358 01/29/22  1815   PH 7.48* 7.48* 7.46* 7.48*   PCO2 30* 32* 34* 33*   PO2 69* 62* 73* 77*   HCO3 22 24 24 25   O2PER 60 70 50 55     R: Pt should return to Bipap over night.

## 2022-01-31 NOTE — ADDENDUM NOTE
Addendum  created 01/30/22 2006 by Jose Matos APRN CRNA    Child order released for a procedure order, Clinical Note Signed, Flowsheet accepted, Intraprocedure Blocks edited, Intraprocedure Event edited, LDA properties accepted

## 2022-01-31 NOTE — PROGRESS NOTES
01/31/22 1600   Oxygen Therapy   SpO2 92 %   O2 Device Nasal cannula   FiO2 (%) 50 %   Oxygen Delivery 35 LPM       Pt off Bipap all shift. Currently med/surg status

## 2022-01-31 NOTE — PROGRESS NOTES
[facility]    Medicine Progress Note - Hospitalist Service    Date of Admission:  1/26/2022    Assessment & Plan          Patient is a 66-year-old female without known significant past medical history who presented with COVID-19 pneumonia, bilateral pulmonary emboli, and acute hypoxic respiratory failure with systemic inflammatory response syndrome.  Patient was critically ill and required ICU management with BiPAP for several days but is now trending towards improvement and has been off BiPAP for the past 24 hours with patient being able to wean down on high flow nasal cannula overall in the past 24 hours.  During this hospitalization patient has been discovered to have a newly diagnosed cardiomyopathy with EF of 40 to 45% and also has been diagnosed with type 2 diabetes with a hemoglobin A1c of 6.5.  Given her lack of noninvasive pressure support needs for the past 24 hours with ongoing clinical improvement will transition patient out of ICU status onto the general medical floor for ongoing management    Principal Problem:    Pneumonia due to 2019 novel coronavirus    Assessment: Symptom onset unclear but patient had a positive PCR test on 1/26.  Has not been vaccinated and did require BiPAP initiation at the start of this hospitalization.  Currently on high flow nasal cannula and trending towards improvement    Plan:   - Continue dexamethasone, day 5 of 10  - S/p Actemra on 1/27  - Eliquis for ongoing PE treatment  - Continue HFNC and wean as tolerated, transition out of ICU status today given ongoing respiratory improvement and no Bipap needs for over 24 hours  - Occupation therapy order for education regarding energy conservation placed.      Active Problems:    Multiple subsegmental pulmonary emboli without acute cor pulmonale (H)    Assessment: Noted at time of hospitalization.  Patient had been on a heparin drip but has been transitioned to Eliquis with ongoing respiratory  improvement as above    Plan: Continue with loading doses of Eliquis at this time followed by prolonged Eliquis at time of discharge      Acute respiratory failure with hypoxia (H)    Assessment: Secondary to COVID-19 pneumonia and pulmonary emboli as outlined above, trending towards improvement with patient not requiring noninvasive pressure support for the past 24 hours and is weaning down on high flow nasal cannula oxygen needs    Plan: Continue with plan as above, further titrate down on oxygen supplementation as able.      New onset type 2 diabetes mellitus (H)-A1c 6.5    Assessment: Patient not currently on any medication scheduled with blood sugars well controlled with n.p.o. status.  Patient has been started on a moderate carbohydrate diet with blood sugars continuing to be overall controlled in the 90-1 20 range    Plan: Continue to monitor for now and cover mild hypoglycemia with NovoLog sliding scale, especially while patient is on dexamethasone.  If persistent elevation does occur, patient may benefit from Metformin administration.  She will need ongoing education regarding newly diagnosed diabetes and is very interested in lifestyle modification going forward to control her diabetes rather than medication.      Cardiomyopathy (H)-LV EF 40-45%    Assessment: Echocardiogram performed at start of this hospitalization shows decreased left ventricular ejection fraction of 40 to 45% thought to be acute but without signs of volume overload.  Cardiomyopathy is suspected secondary to COVID-19 viral infection and/or stress cardiomyopathy with cardiac work-up showing no concerns for ischemic disease, myocarditis.  Beta-blocker has not yet been initiated as patient recently had bradycardia related to Precedex administration.  Heart rate is now increasing into the 60s to 80s range in the past 24 hours    Plan: Continue to avoid IV fluids if possible.  Would consider low-dose beta-blocker if patient's blood pressure  and pulse continue to be stable in the next 24 hours      Left thigh numbness    Assessment: Developing upon awakening with exam today not following any dermatome but following superficial nerve pattern of the thigh.  Patient did sleep in an atypical position on her left side with a pillow between her legs prior to symptom onset, which could have allowed for pressure on the superficial nerve with subsequent numbness    Plan: We will continue to monitor closely and would anticipate progressive resolution in upcoming days.  If ongoing persistent numbness continues, may need to evaluate further      Hyponatremia-admit Na 131    Assessment: Present at time of admission, thought secondary to chronic diuretic therapy and poor oral intake, now resolved    Plan: Continue to monitor for ongoing resolution      Hypopotassemia-admit K 3.0    Assessment: Resolved following supplementation    Plan: Continue to monitor and supplement as indicated      Abnormal transaminases    Assessment: Mildly elevated, thought secondary to COVID-19 infection    Plan: We will recheck tomorrow for reevaluation      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    Assessment: Noted incidentally on imaging performed at the start of this hospitalization and of unclear clinical significance    Plan: No acute intervention is needed but patient will need outpatient follow-up following recovery from acute illness for further evaluation as appropriate      SIRS (systemic inflammatory response syndrome) (H)-tachycardia, tachypnea, leukocytosis    Assessment: Secondary to COVID-19 pneumonia and pulmonary emboli    Plan: Continue to monitor for ongoing resolution      Lymphopenia due to COVID-19 virus    Assessment: Noted initially, resolving    Plan: No further routine monitoring is needed      LBBB (left bundle branch block)-chronic    Assessment: Still present on EKG    Plan: No acute intervention is needed      Hypertension    Assessment: Chronic,  with patient normally on hydrochlorothiazide which has been held during this stay to date due to electrolyte abnormalities.  Blood pressures have been stable    Plan: Continue to hold home hydrochlorothiazide.  Consider initiation of beta-blocker secondary to cardiomyopathy as above      Obesity    Assessment: Chronic and stable    Plan: No acute intervention needed, patient would benefit from weight loss on an outpatient basis.  Did discuss how this may influence her diabetes diagnosis as well and patient is very motivated to lose weight when she respiratorily recovers       Diet: Moderate Consistent Carb (60 g CHO per Meal) Diet    DVT Prophylaxis: Enoxaparin (Lovenox) SQ  Gomez Catheter: Not present  Central Lines: None  Cardiac Monitoring: ACTIVE order. Indication: ICU  Code Status: Full Code      Disposition Plan   Expected Discharge: 02/04/2022     Anticipated discharge location:  Awaiting care coordination huddle  Delays:     Covid Test Positive  Oxygen Needs            The patient's care was discussed with the Bedside Nurse and Patient.    Lo Almonte MD  Hospitalist Service  [facility]  Securely message with the Vocera Web Console (learn more here)  Text page via TrackTik Paging/Directory         Clinically Significant Risk Factors Present on Admission                    ______________________________________________________________________    Interval History   Patient continues to be vitally stable.  She remained on high flow nasal cannula throughout the night and tolerated it well with further titration down on FiO2 and improved activity tolerance this morning compared to yesterday.  She denies any new symptoms or concerns.  Per nursing there is been no recurrent bradycardia and no new concerns developing.    Data reviewed today: I reviewed all medications, new labs and imaging results over the last 24 hours.    Physical Exam   Vital Signs: Temp: 98.6  F (37   C) Temp src: Oral BP: 126/76 Pulse: 86   Resp: 24 SpO2: 94 % O2 Device: Nasal cannula Oxygen Delivery: 30 LPM  Weight: 175 lbs 8 oz  Constitutional: awake, alert, cooperative, no apparent distress, and appears stated age  Respiratory: No increased work of breathing at rest but patient does become tachypneic quickly when moving around the bed to allow for exam to occur.  Breath sounds are severely diminished but no crackles or wheezes are auscultated  Cardiovascular: Regular rate and rhythm without murmur  GI: Bowel sounds present, abdomen is obese but soft, nontender, nondistended  Skin: no redness, warmth, or swelling and no rashes  Musculoskeletal: no lower extremity pitting edema present  Neurologic: Awake, alert, oriented to name, place and situation    Data   Recent Labs   Lab  01/31/22  1145 01/31/22  0839 01/31/22  0626 01/30/22  0401 01/30/22  0358 01/29/22  0741 01/29/22  0607 01/29/22  0605 01/28/22  0832 01/28/22  0508 01/27/22  0824 01/27/22  0518 01/26/22  1059 01/26/22  1058   WBC   --   --   --   --   --   --  8.9  --   --  11.6*  --  13.0*   < >  --    HGB   --   --   --   --   --   --  13.4  --   --  14.1  --  13.3   < >  --    MCV   --   --   --   --   --   --  90  --   --  88  --  80   < >  --    PLT   --   --   --   --   --   --  266  --   --  275  --  225   < >  --    INR   --   --   --   --   --   --   --   --   --   --   --   --   --  1.09   NA   --   --  144  --  148*  --   --  148*  --  141  --  136  --  131*   POTASSIUM   --   --  3.5  --  3.8  --   --  3.9  --  4.1  --  4.4  --  3.0*   CHLORIDE   --   --  114*  --  120*  --   --  118*  --  109  --  104  --  95   CO2   --   --  22  --  23  --   --  25  --  24  --  25  --  27   BUN   --   --  23  --  34*  --   --  43*  --  47*  --  37*  --  26   CR   --   --  0.63  --  0.60  --   --  0.67  --  0.70  --  0.73  --  0.67   ANIONGAP   --   --  8  --  5  --   --  5  --  8  --  7  --  9   FATUMA   --   --  8.7  --  8.7  --   --  8.7  --  9.0  --   8.8  --  8.5   GLC  107* 89 95   < > 109*   < >  --  145*   < > 156*   < > 159*   < > 134*   ALBUMIN   --   --   --   --   --   --   --   --   --   --   --   --   --  2.1*   PROTTOTAL   --   --   --   --   --   --   --   --   --   --   --   --   --  8.0   BILITOTAL   --   --   --   --   --   --   --   --   --   --   --   --   --  1.2   ALKPHOS   --   --   --   --   --   --   --   --   --   --   --   --   --  142   ALT   --   --   --   --   --   --   --   --   --   --   --   --   --  58*   AST   --   --   --   --   --   --   --   --   --   --   --   --   --  60*    < > = values in this interval not displayed.

## 2022-01-31 NOTE — PLAN OF CARE
"BP (!) 153/70   Pulse 88   Temp 97.8  F (36.6  C) (Oral)   Resp 16   Ht 1.575 m (5' 2\")   Wt 79.6 kg (175 lb 8 oz)   LMP 05/13/2004   SpO2 91%   BMI 32.10 kg/m    Patient switched to HFNC this morning. Tolerating well at 60% 35L in the mid 90s. No dyspnea or SOB reported with activity. Respiratory rate 18-24 most of the day. Up to chair x2 for meals. Up with assist of 2, reports chronic vertigo at baseline per patient. Using bedside commode. Tolerating diet with no nausea. Denies pain. Coccyx sore, blanchable redness noted. Sacral dressing in place.   "

## 2022-01-31 NOTE — PROGRESS NOTES
01/31/22 1249   Quick Adds   Type of Visit Initial Occupational Therapy Evaluation   Living Environment   People in home spouse   Current Living Arrangements house   Transportation Anticipated car, drives self;family or friend will provide   Living Environment Comments Patient lives with spouse, daughter is in town from [place] to assist for caring for patient and  with covid 19 infections. Patient spouse was also hospitalized and recently discharged to home. Patient reports her daughter will stay in town as long as needed to assist.    Self-Care   Usual Activity Tolerance good   Current Activity Tolerance poor   Equipment Currently Used at Home none   Instrumental Activities of Daily Living (IADL)   Previous Responsibilities meal prep;housekeeping;laundry;shopping;yardwork;medication management;finances;driving   Disability/Function   Hearing Difficulty or Deaf no   Wear Glasses or Blind yes   Vision Management glasses for reading   Concentrating, Remembering or Making Decisions Difficulty no   Difficulty Communicating no   Difficulty Eating/Swallowing no   Walking or Climbing Stairs Difficulty no   Dressing/Bathing Difficulty no   Toileting issues no   Doing Errands Independently Difficulty (such as shopping) no   Fall history within last six months no   Change in Functional Status Since Onset of Current Illness/Injury yes   General Information   Onset of Illness/Injury or Date of Surgery 01/26/22   Referring Physician  Lo Almonte MD   Patient/Family Therapy Goal Statement (OT) to discharge home when medically ready   Additional Occupational Profile Info/Pertinent History of Current Problem Stacie Hair is a 66 year old female admitted on 1/26/2022 due to concerns for severe COVID-19 pneumonia, bilateral pulmonary emboli, acute hypoxic respiratory failure and systemic inflammatory response syndrome.  With BiPAP and treatment with therapeutic anticoagulation, oxygenation appears to  have stabilized over the last 2 days and is trending toward improvement.  Echocardiogram was abnormal with decreased LVEF 40 to 45% concerning for cardiomyopathy which is a new diagnosis.  A1c was elevated at 6.5 concerning for type 2 diabetes which is also a new diagnosis.  Overall, she remains critically ill with life-threatening respiratory failure   Performance Patterns (Routines, Roles, Habits) retired   Existing Precautions/Restrictions oxygen therapy device and L/min  (70% fiO2)   Left Upper Extremity (Weight-bearing Status) full weight-bearing (FWB)   Right Upper Extremity (Weight-bearing Status) full weight-bearing (FWB)   Left Lower Extremity (Weight-bearing Status) full weight-bearing (FWB)   Right Lower Extremity (Weight-bearing Status) full weight-bearing (FWB)   General Observations and Info Activity: up in chair   Cognitive Status Examination   Orientation Status orientation to person, place and time   Affect/Mental Status (Cognitive) WFL;flat/blunted affect   Follows Commands WFL   Visual Perception   Visual Impairment/Limitations WFL   Sensory   Sensory Comments Reports n/t in upper left thigh which is new   Range of Motion Comprehensive   General Range of Motion bilateral upper extremity ROM WFL   Clinical Impression   Criteria for Skilled Therapeutic Interventions Met (OT) yes;meets criteria;skilled treatment is necessary   OT Diagnosis Decreased tolerance ADLs/IADLs   OT Problem List-Impairments impacting ADL problems related to;activity tolerance impaired;other (see comments)  (respiratory status)   Assessment of Occupational Performance 3-5 Performance Deficits   Identified Performance Deficits dressing, toileting, bathing, home mgmt, community mobility, shopping   Planned Therapy Interventions (OT) ADL retraining;IADL retraining;home program guidelines;progressive activity/exercise;risk factor education   Clinical Decision Making Complexity (OT) low complexity   Therapy Frequency (OT) 1x eval  and treat   Predicted Duration of Therapy one day   Anticipated Equipment Needs Upon Discharge (OT)   (shower chair)   Risk & Benefits of therapy have been explained evaluation/treatment results reviewed;care plan/treatment goals reviewed;patient   Comment-Clinical Impression Patient benefitting from one time eval and tx for training in energy conservation techniques, PLB, gradual return to ADLs/IADLs. Anticipate as patient recovers medically, functional performance will also improve. Patient has good support from daughter and  when ready to discharge to home   OT Discharge Planning    OT Discharge Recommendation (DC Rec) Home with assist;home with outpatient occupational therapy;home with outpatient pulmonary rehab   OT Rationale for DC Rec Anticipate with medical improvement, ADLs and functional mobility will also progress without skilled intervention. Patient may benefit from OP HI and OP OT to progress endurance and return to daily daily activity.    OT Brief overview of current status  Completed education in covid-19 education materials including Energy conservation, PLB, and gradual return to daily activity   Total Evaluation Time (Minutes)   Total Evaluation Time (Minutes) 5       Thank you for the referral,    Sofi Da Silva, ADELAIDA, OTR/L  [facility] - Occupational Therapy    Phone: [phone]  Email: [email or web address]

## 2022-02-01 LAB
ALBUMIN SERPL-MCNC: 2.2 G/DL (ref 3.4–5)
ALP SERPL-CCNC: 79 U/L (ref 40–150)
ALT SERPL W P-5'-P-CCNC: 62 U/L (ref 0–50)
ANION GAP SERPL CALCULATED.3IONS-SCNC: 6 MMOL/L (ref 3–14)
AST SERPL W P-5'-P-CCNC: 39 U/L (ref 0–45)
BACTERIA BLD CULT: NO GROWTH
BILIRUB SERPL-MCNC: 0.5 MG/DL (ref 0.2–1.3)
BUN SERPL-MCNC: 15 MG/DL (ref 7–30)
CALCIUM SERPL-MCNC: 8.6 MG/DL (ref 8.5–10.1)
CHLORIDE BLD-SCNC: 113 MMOL/L (ref 94–109)
CO2 SERPL-SCNC: 24 MMOL/L (ref 20–32)
CREAT SERPL-MCNC: 0.58 MG/DL (ref 0.52–1.04)
CRP SERPL-MCNC: 16.9 MG/L (ref 0–8)
ERYTHROCYTE [DISTWIDTH] IN BLOOD BY AUTOMATED COUNT: 14 % (ref 10–15)
GFR SERPL CREATININE-BSD FRML MDRD: >90 ML/MIN/1.73M2
GLUCOSE BLD-MCNC: 97 MG/DL (ref 70–99)
GLUCOSE BLDC GLUCOMTR-MCNC: 122 MG/DL (ref 70–99)
GLUCOSE BLDC GLUCOMTR-MCNC: 133 MG/DL (ref 70–99)
GLUCOSE BLDC GLUCOMTR-MCNC: 135 MG/DL (ref 70–99)
GLUCOSE BLDC GLUCOMTR-MCNC: 141 MG/DL (ref 70–99)
GLUCOSE BLDC GLUCOMTR-MCNC: 91 MG/DL (ref 70–99)
HCT VFR BLD AUTO: 36.4 % (ref 35–47)
HGB BLD-MCNC: 12.1 G/DL (ref 11.7–15.7)
MCH RBC QN AUTO: 30 PG (ref 26.5–33)
MCHC RBC AUTO-ENTMCNC: 33.2 G/DL (ref 31.5–36.5)
MCV RBC AUTO: 90 FL (ref 78–100)
PLATELET # BLD AUTO: 254 10E3/UL (ref 150–450)
POTASSIUM BLD-SCNC: 4.2 MMOL/L (ref 3.4–5.3)
PROT SERPL-MCNC: 5.6 G/DL (ref 6.8–8.8)
RBC # BLD AUTO: 4.04 10E6/UL (ref 3.8–5.2)
SODIUM SERPL-SCNC: 143 MMOL/L (ref 133–144)
WBC # BLD AUTO: 6.3 10E3/UL (ref 4–11)

## 2022-02-01 PROCEDURE — 250N000013 HC RX MED GY IP 250 OP 250 PS 637: Performed by: FAMILY MEDICINE

## 2022-02-01 PROCEDURE — 99233 SBSQ HOSP IP/OBS HIGH 50: CPT | Performed by: FAMILY MEDICINE

## 2022-02-01 PROCEDURE — 120N000001 HC R&B MED SURG/OB

## 2022-02-01 PROCEDURE — 99207 PR CDG-MDM COMPONENT: MEETS HIGH - UP CODED: CPT | Performed by: FAMILY MEDICINE

## 2022-02-01 PROCEDURE — 36415 COLL VENOUS BLD VENIPUNCTURE: CPT | Performed by: FAMILY MEDICINE

## 2022-02-01 PROCEDURE — 80053 COMPREHEN METABOLIC PANEL: CPT | Performed by: FAMILY MEDICINE

## 2022-02-01 PROCEDURE — 86140 C-REACTIVE PROTEIN: CPT | Performed by: FAMILY MEDICINE

## 2022-02-01 PROCEDURE — 250N000011 HC RX IP 250 OP 636: Performed by: FAMILY MEDICINE

## 2022-02-01 PROCEDURE — 85027 COMPLETE CBC AUTOMATED: CPT | Performed by: FAMILY MEDICINE

## 2022-02-01 RX ORDER — CARVEDILOL 3.12 MG/1
1.56 TABLET, FILM COATED ORAL 2 TIMES DAILY WITH MEALS
Status: DISCONTINUED | OUTPATIENT
Start: 2022-02-01 | End: 2022-02-02

## 2022-02-01 RX ORDER — FUROSEMIDE 20 MG
20 TABLET ORAL ONCE
Status: COMPLETED | OUTPATIENT
Start: 2022-02-01 | End: 2022-02-01

## 2022-02-01 RX ADMIN — DEXTRAN 70, GLYCERIN, HYPROMELLOSE 1 DROP: 1; 2; 3 SOLUTION/ DROPS OPHTHALMIC at 21:06

## 2022-02-01 RX ADMIN — DEXTRAN 70, GLYCERIN, HYPROMELLOSE 1 DROP: 1; 2; 3 SOLUTION/ DROPS OPHTHALMIC at 13:44

## 2022-02-01 RX ADMIN — DEXAMETHASONE 6 MG: 4 TABLET ORAL at 08:00

## 2022-02-01 RX ADMIN — DEXTRAN 70, GLYCERIN, HYPROMELLOSE 1 DROP: 1; 2; 3 SOLUTION/ DROPS OPHTHALMIC at 08:01

## 2022-02-01 RX ADMIN — FUROSEMIDE 20 MG: 20 TABLET ORAL at 13:47

## 2022-02-01 RX ADMIN — APIXABAN 10 MG: 5 TABLET, FILM COATED ORAL at 08:00

## 2022-02-01 RX ADMIN — Medication 1.56 MG: at 18:43

## 2022-02-01 RX ADMIN — APIXABAN 10 MG: 5 TABLET, FILM COATED ORAL at 21:05

## 2022-02-01 ASSESSMENT — ACTIVITIES OF DAILY LIVING (ADL)
ADLS_ACUITY_SCORE: 11
ADLS_ACUITY_SCORE: 11
ADLS_ACUITY_SCORE: 9
ADLS_ACUITY_SCORE: 11
ADLS_ACUITY_SCORE: 9
ADLS_ACUITY_SCORE: 11
ADLS_ACUITY_SCORE: 9
ADLS_ACUITY_SCORE: 11
ADLS_ACUITY_SCORE: 9
ADLS_ACUITY_SCORE: 11
ADLS_ACUITY_SCORE: 9
ADLS_ACUITY_SCORE: 9

## 2022-02-01 ASSESSMENT — MIFFLIN-ST. JEOR: SCORE: 1195.43

## 2022-02-01 NOTE — CONSULTS
CLINICAL NUTRITION SERVICES - BRIEF NOTE   Nutrition Prescription  RECOMMENDATIONS FOR MDs/PROVIDERS TO ORDER:  None at this time   Recommendations already ordered by Registered Dietitian (RD):  Radha BULLARD   Room Service Appropriate   Future/Additional Recommendations:  See assessment note from 1/27/22     Reason for Assessment:  Nutrition education regarding new dx of diabetes.   Diet History:  Patient familiar with general nutritional guidelines. States she follows a healthy diet at home, eats organic, juices own fruits/vegetables. Patient is allergic to milk and eggs. While here, daughter has been bringing in oral supplements from home. Patient wanting to try plant-based ONS we have here.   Nutrition Diagnosis:  Food- and nutrition-related knowledge deficit r/t no previous knowledge of diabetes diet education.   Interventions:  Nutrition Education:   -Educated pt on pathophysiology of diabetes and why following a diabetic diet is necessary  -Educated pt on using MyPlate for meal planning and discussed recommended   -Emphasized the importance of consuming consistent CHOs throughout the day   -Discussed healthy snack options and suggested pairing a protein w/a complex CHO  -Educated pt on CHO counting and provided Guide to Carbohydrate Counting booklet    Provided handouts Living Healthy with Diabetes, Consistent Carbohydrate Nutrition Therapy, Carbohydrate Counting for People with Diabetes  Medical food supplement therapy - Radha BULLARD   Modify composition of meals/snacks - Room Service Appropriate   Goals:   Patient will verbalize understanding of carbohydrate containing foods and how they effect blood sugar levels.  Follow-up:    Patient to ask any further nutrition-related questions before discharge.  In addition, pt may request outpatient RD appointment.    Omar Mccbae RDN, LD  Clinical Dietitian   Office: [phone]  Weekend Pager: [phone]

## 2022-02-01 NOTE — PLAN OF CARE
Pt transferred to unit last night, High flow 50% 30LPM sats 96%. Transfers to bedside commode standby assist. Pt sats 88% with activity but recovers within 1min. Pt has numbness in L upper thigh. Pt alert and orientated x4. Pt has very little appetite. Will encourage to eat and drink throughout shift. VSS. Denies pain.

## 2022-02-01 NOTE — PROGRESS NOTES
Pt transferred from room 217 to 257. Pt settled, FIO2 50 % 35LPM. Pt transferred from wheelchair to bed with minimal assist. Saline locked.

## 2022-02-01 NOTE — PLAN OF CARE
FIO2 increased to 70% because Patient Desat down to 87% , With FIO2 50%  with activity. Will continue to monitor patient.

## 2022-02-01 NOTE — PROGRESS NOTES
Patient is now med/surg status, but will continued to be housed in the ICU area for now due to staffing issues. Patient is aware.

## 2022-02-01 NOTE — PROGRESS NOTES
[facility]    Medicine Progress Note - Hospitalist Service    Date of Admission:  1/26/2022    Assessment & Plan          Patient is a 66-year-old female without known significant past medical history who presented with COVID-19 pneumonia, bilateral pulmonary emboli, and acute hypoxic respiratory failure with systemic inflammatory response syndrome.  Patient was initially critically ill and required ICU management with BiPAP and received Actermra x1 and dexamethasone with slow improvement noted and currently is on HFNC on the med/surg floor with ongoing ability to slowly titrate downward.  During this hospitalization patient has been discovered to have a newly diagnosed cardiomyopathy with EF of 40 to 45% and also has been diagnosed with type 2 diabetes with a hemoglobin A1c of 6.5.      Principal Problem:    Pneumonia due to 2019 novel coronavirus    Assessment: Symptom onset unclear but patient had a positive PCR test on 1/26.  Has not been vaccinated and did require BiPAP initiation at the start of this hospitalization.  Currently on high flow nasal cannula and trending towards improvement weaned down to 35L and 50% overnight, did need to increase to 70% with activity this morning but recovering faster overall    Plan:   - Continue dexamethasone, day 6 of 10  - S/p Actemra on 1/27  - Eliquis for ongoing PE treatment  - Continue HFNC and wean as tolerated    Active Problems:    Multiple subsegmental pulmonary emboli without acute cor pulmonale (H)    Assessment: Noted at time of hospitalization.  Patient had been on a heparin drip but has been transitioned to Eliquis on 1/30/22 with ongoing respiratory improvement as above    Plan: Continue with loading doses of Eliquis at this time followed by ongoing Eliquis course for PE management at time of discharge      Acute respiratory failure with hypoxia (H)    Assessment: Secondary to COVID-19 pneumonia and pulmonary emboli as outlined  above, trending towards improvement with overall reduction in HFNC needs, slight improvement in activity tolerance.    Plan: Continue with plan as above, further titrate down on oxygen supplementation as able.      New onset type 2 diabetes mellitus (H)-A1c 6.5      Assessment: Patient not currently on any medication scheduled with blood sugars well controlled with n.p.o. status.  Patient has been started on a moderate carbohydrate diet with blood sugars continuing to be overall controlled in the  range.  Patient would really like to avoid medication for this and is very committed to diet and lifestyle modification going forward    Plan: Continue to monitor for now and cover mild hyperglycemia with NovoLog sliding scale, especially while patient is on dexamethasone.  If persistent elevation does occur, patient may benefit from Metformin administration.  Diabetic education via nutritionist to occur today      Cardiomyopathy (H)-LV EF 40-45%    Assessment: Echocardiogram performed at start of this hospitalization shows decreased left ventricular ejection fraction of 40 to 45% thought to be acute but without signs of volume overload  Cardiomyopathy is suspected secondary to COVID-19 viral infection and/or stress cardiomyopathy with cardiac work-up showing no concerns for ischemic disease, myocarditis.  Patient having slightly increase in non-pitting edema in past few days    Plan: Continue to avoid IV fluids if possible.  Will give Lasix 20 mg x 1 this evening and initiate Coreg 1.5625 mg twice daily and monitor closely for blood pressure and heart rate tolerance. Will add ACE inhibitor as able going forward. Patient will need outpatient follow-up with cardiology and repeat echocardiogram following discharge      Left thigh numbness    Assessment: Developing upon awakening on 1/31/22 with exam not following any dermatome but following superficial nerve pattern of the thigh.  Patient did sleep in an atypical  position on her left side with a pillow between her legs prior to symptom onset, which could have allowed for pressure on the superficial nerve with subsequent numbness. Is now starting to resolve    Plan: Continue to monitor for ongoing resolution      Hyponatremia-admit Na 131    Assessment: Present at time of admission, thought secondary to chronic diuretic therapy and poor oral intake, now resolved    Plan: Continue to monitor for ongoing resolution      Hypopotassemia-admit K 3.0    Assessment: Resolved following supplementation    Plan: Continue to monitor and supplement as indicated      Abnormal transaminases    Assessment: Mildly elevated, thought secondary to COVID-19 infection    Plan: Continue intermittent monitoring to ensure ongoing improvement as Covid infection resolves      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    Assessment: Noted incidentally on imaging performed at the start of this hospitalization and of unclear clinical significance    Plan: No acute intervention is needed but patient will need outpatient follow-up following recovery from acute illness for further evaluation as appropriate      SIRS (systemic inflammatory response syndrome) (H)-tachycardia, tachypnea, leukocytosis    Assessment: Secondary to COVID-19 pneumonia and pulmonary emboli    Plan: Continue to monitor for ongoing resolution      Lymphopenia due to COVID-19 virus    Assessment: Noted initially, resolving    Plan: No further routine monitoring is needed      LBBB (left bundle branch block)-chronic    Assessment: Still present on EKG    Plan: No acute intervention is needed      Hypertension    Assessment: Chronic, with patient normally on hydrochlorothiazide which has been held during this stay to date due to electrolyte abnormalities.  Blood pressures have been stable    Plan: We will initiate Coreg as outlined above and monitor blood pressures closely for tolerance      Obesity    Assessment: Chronic and  stable    Plan: No acute intervention needed, patient would benefit from weight loss on an outpatient basis.  Did discuss how this may influence her diabetes diagnosis as well and patient is very motivated to lose weight when she respiratorily recovers         Diet: Moderate Consistent Carb (60 g CHO per Meal) Diet    DVT Prophylaxis: Eliquis  Gomez Catheter: Not present  Central Lines: None  Cardiac Monitoring: None  Code Status: Full Code      Disposition Plan   Expected Discharge: 02/04/2022     Anticipated discharge location:  Awaiting care coordination huddle  Delays:     Covid Test Positive  Oxygen Needs            The patient's care was discussed with the Bedside Nurse, Patient and Patient's Family.    Lo Almonte MD  Hospitalist Service  [facility]  Securely message with the Vocera Web Console (learn more here)  Text page via [employer] Paging/Directory         Clinically Significant Risk Factors Present on Admission                    ______________________________________________________________________    Interval History   Patient has been vitally stable and has been able to decrease slightly more on high flow nasal cannula needs over the past 24 hours overall with improved activity tolerance but ongoing desaturations with activity noted. The numbness in her left thigh has significantly improved in the past 24 hours. Patient has noticed increased lower extremity edema which she reports she has had intermittently in the home environment and she normally treats with Himalayan salts. She denies any other new symptoms. No other nursing concerns.    Data reviewed today: I reviewed all medications, new labs and imaging results over the last 24 hours.    Physical Exam   Vital Signs: Temp: 98.2  F (36.8  C) Temp src: Oral BP: 112/67 Pulse: 70   Resp: 20 SpO2: 94 % O2 Device: High Flow Nasal Cannula (HFNC) Oxygen Delivery: 35 LPM  Weight: 153 lbs 11.2 oz  Constitutional:  awake, alert, cooperative, no apparent distress, and appears stated age  Respiratory: No increased work of breathing, patient has decreased breath sounds diffusely but air movement is increasing overall, no wheezing or crackles on auscultation  Cardiovascular: Regular rate and rhythm  GI: Bowel sounds present, abdomen soft and nontender  Skin: No rashes, no lesions  Musculoskeletal:  Patient has trace to 1+ nonpitting edema in bilateral lower extremities  Neurologic: Awake, alert, oriented to name, place and situation    Data   Recent Labs   Lab  02/01/22  1202 02/01/22  0732 02/01/22  0712 01/31/22  0839 01/31/22  0626 01/30/22  0401 01/30/22  0358 01/29/22  0741 01/29/22  0607 01/28/22  0832 01/28/22  0508 01/26/22  1059 01/26/22  1058   WBC   --   --  6.3  --   --   --   --   --  8.9  --  11.6*   < >  --    HGB   --   --  12.1  --   --   --   --   --  13.4  --  14.1   < >  --    MCV   --   --  90  --   --   --   --   --  90  --  88   < >  --    PLT   --   --  254  --   --   --   --   --  266  --  275   < >  --    INR   --   --   --   --   --   --   --   --   --   --   --   --  1.09   NA   --   --  143  --  144  --  148*  --   --    < > 141   < > 131*   POTASSIUM   --   --  4.2  --  3.5  --  3.8  --   --    < > 4.1   < > 3.0*   CHLORIDE   --   --  113*  --  114*  --  120*  --   --    < > 109   < > 95   CO2   --   --  24  --  22  --  23  --   --    < > 24   < > 27   BUN   --   --  15  --  23  --  34*  --   --    < > 47*   < > 26   CR   --   --  0.58  --  0.63  --  0.60  --   --    < > 0.70   < > 0.67   ANIONGAP   --   --  6  --  8  --  5  --   --    < > 8   < > 9   FATUMA   --   --  8.6  --  8.7  --  8.7  --   --    < > 9.0   < > 8.5   GLC  122* 91 97   < > 95   < > 109*   < >  --    < > 156*   < > 134*   ALBUMIN   --   --  2.2*  --   --   --   --   --   --   --   --   --  2.1*   PROTTOTAL   --   --  5.6*  --   --   --   --   --   --   --   --   --  8.0   BILITOTAL   --   --  0.5  --   --   --   --   --   --   --   --    --  1.2   ALKPHOS   --   --  79  --   --   --   --   --   --   --   --   --  142   ALT   --   --  62*  --   --   --   --   --   --   --   --   --  58*   AST   --   --  39  --   --   --   --   --   --   --   --   --  60*    < > = values in this interval not displayed.

## 2022-02-02 LAB
ANION GAP SERPL CALCULATED.3IONS-SCNC: 4 MMOL/L (ref 3–14)
BUN SERPL-MCNC: 18 MG/DL (ref 7–30)
CALCIUM SERPL-MCNC: 8.6 MG/DL (ref 8.5–10.1)
CHLORIDE BLD-SCNC: 112 MMOL/L (ref 94–109)
CO2 SERPL-SCNC: 26 MMOL/L (ref 20–32)
CREAT SERPL-MCNC: 0.63 MG/DL (ref 0.52–1.04)
CRP SERPL-MCNC: 9.9 MG/L (ref 0–8)
GFR SERPL CREATININE-BSD FRML MDRD: >90 ML/MIN/1.73M2
GLUCOSE BLD-MCNC: 122 MG/DL (ref 70–99)
GLUCOSE BLDC GLUCOMTR-MCNC: 124 MG/DL (ref 70–99)
GLUCOSE BLDC GLUCOMTR-MCNC: 128 MG/DL (ref 70–99)
GLUCOSE BLDC GLUCOMTR-MCNC: 136 MG/DL (ref 70–99)
GLUCOSE BLDC GLUCOMTR-MCNC: 92 MG/DL (ref 70–99)
HOLD SPECIMEN: NORMAL
POTASSIUM BLD-SCNC: 3.9 MMOL/L (ref 3.4–5.3)
SODIUM SERPL-SCNC: 142 MMOL/L (ref 133–144)

## 2022-02-02 PROCEDURE — 120N000001 HC R&B MED SURG/OB

## 2022-02-02 PROCEDURE — 80048 BASIC METABOLIC PNL TOTAL CA: CPT | Performed by: FAMILY MEDICINE

## 2022-02-02 PROCEDURE — 250N000013 HC RX MED GY IP 250 OP 250 PS 637: Performed by: FAMILY MEDICINE

## 2022-02-02 PROCEDURE — 86140 C-REACTIVE PROTEIN: CPT | Performed by: FAMILY MEDICINE

## 2022-02-02 PROCEDURE — 99207 PR CDG-MDM COMPONENT: MEETS HIGH - UP CODED: CPT | Performed by: FAMILY MEDICINE

## 2022-02-02 PROCEDURE — 250N000011 HC RX IP 250 OP 636: Performed by: FAMILY MEDICINE

## 2022-02-02 PROCEDURE — 36415 COLL VENOUS BLD VENIPUNCTURE: CPT | Performed by: FAMILY MEDICINE

## 2022-02-02 PROCEDURE — 99233 SBSQ HOSP IP/OBS HIGH 50: CPT | Performed by: FAMILY MEDICINE

## 2022-02-02 RX ORDER — CARVEDILOL 3.12 MG/1
3.12 TABLET ORAL 2 TIMES DAILY WITH MEALS
Status: DISCONTINUED | OUTPATIENT
Start: 2022-02-02 | End: 2022-02-04

## 2022-02-02 RX ADMIN — CARVEDILOL 3.12 MG: 3.12 TABLET, FILM COATED ORAL at 17:16

## 2022-02-02 RX ADMIN — DEXTRAN 70, GLYCERIN, HYPROMELLOSE 1 DROP: 1; 2; 3 SOLUTION/ DROPS OPHTHALMIC at 17:16

## 2022-02-02 RX ADMIN — DEXTRAN 70, GLYCERIN, HYPROMELLOSE 1 DROP: 1; 2; 3 SOLUTION/ DROPS OPHTHALMIC at 08:11

## 2022-02-02 RX ADMIN — APIXABAN 10 MG: 5 TABLET, FILM COATED ORAL at 21:42

## 2022-02-02 RX ADMIN — ALBUTEROL SULFATE 2 PUFF: 90 AEROSOL, METERED RESPIRATORY (INHALATION) at 19:39

## 2022-02-02 RX ADMIN — DEXTRAN 70, GLYCERIN, HYPROMELLOSE 1 DROP: 1; 2; 3 SOLUTION/ DROPS OPHTHALMIC at 21:42

## 2022-02-02 RX ADMIN — Medication 1.56 MG: at 08:10

## 2022-02-02 RX ADMIN — DEXAMETHASONE 6 MG: 4 TABLET ORAL at 08:10

## 2022-02-02 RX ADMIN — APIXABAN 10 MG: 5 TABLET, FILM COATED ORAL at 08:09

## 2022-02-02 ASSESSMENT — ACTIVITIES OF DAILY LIVING (ADL)
ADLS_ACUITY_SCORE: 11

## 2022-02-02 ASSESSMENT — MIFFLIN-ST. JEOR: SCORE: 1201.25

## 2022-02-02 NOTE — PROGRESS NOTES
HFNC 50-80%/35-40LPM. Dyspnea on exertion, usually recovers well. Lung sounds diminished. CRP improved. Patient is AO, pleasant. Slept well. Appears to be in a better mood this morning. Good appetite, drank 100% of protein shake. Making adequate urine. VSS. Continue poc.

## 2022-02-02 NOTE — PLAN OF CARE
Major Events: Decreased HFNC settings    Vitals: stable  Oxygen Needs: HFNC 50% and 40 L  Pain: Denies    Neuro: Alert and oriented x4 Pulmonary: Lung sounds Clear on left side, Ride side diminished Cardiovascular: WNL GI/: WNL    Infusion Status: Saline locked   PRN's given: None  Protocols: K+ protocol- recheck lab in AM.      Plan: Continue to monitor, wean O2 as able.

## 2022-02-02 NOTE — PROGRESS NOTES
02/02/22 1608   Oxygen Therapy   SpO2 91 %   O2 Device High Flow Nasal Cannula (HFNC)   FiO2 (%) 50 %   Oxygen Delivery 40 LPM     RT to follow

## 2022-02-02 NOTE — PROGRESS NOTES
02/01/22 1540   Oxygen Therapy   SpO2 93 %   O2 Device High Flow Nasal Cannula (HFNC)   FiO2 (%) 60 %   Oxygen Delivery 35 LPM

## 2022-02-02 NOTE — PROGRESS NOTES
[facility]    Medicine Progress Note - Hospitalist Service    Date of Admission:  1/26/2022    Assessment & Plan          Patient is a 66-year-old female without known significant past medical history who presented with COVID-19 pneumonia, bilateral pulmonary emboli, and acute hypoxic respiratory failure with systemic inflammatory response syndrome.  Patient was initially critically ill and required ICU management with BiPAP and received Actermra x1 and dexamethasone with slow improvement noted and currently is on HFNC on the med/surg floor with patient requiring 35 to 40 L and 40 to 80% FiO2 depending on activity level.  During this hospitalization patient has been discovered to have a newly diagnosed cardiomyopathy with EF of 40 to 45% and also has been diagnosed with type 2 diabetes with a hemoglobin A1c of 6.5.      Principal Problem:    Pneumonia due to 2019 novel coronavirus    Assessment: Symptom onset unclear but patient had a positive PCR test on 1/26.  Has not been vaccinated and did require BiPAP initiation at the start of this hospitalization.  Currently on high flow nasal cannula with overall respiratory stability in the past 24 hours but no significant improvement    Plan:   - Continue dexamethasone, day 7 of 10  - S/p Actemra on 1/27  - Eliquis for ongoing PE treatment  - Continue HFNC and wean as tolerated    Active Problems:    Multiple subsegmental pulmonary emboli without acute cor pulmonale (H)    Assessment: Noted at time of hospitalization.  Patient had been on a heparin drip but has been transitioned to Eliquis on 1/30/22 with ongoing respiratory improvement as above    Plan: Continue with loading doses of Eliquis at this time followed by ongoing Eliquis course for PE management at time of discharge      Acute respiratory failure with hypoxia (H)    Assessment: Secondary to COVID-19 pneumonia and pulmonary emboli as outlined above, trending towards improvement  slowly    Plan: Continue with plan as above, further titrate down on oxygen supplementation as able.      New onset type 2 diabetes mellitus (H)-A1c 6.5      Assessment: Patient not currently on any medication scheduled with blood sugars fairly well controlled with moderate carbohydrate diet alone even with dexamethasone administration.  Patient would really like to avoid medication for this and is very committed to diet and lifestyle modification going forward    Plan: Continue to monitor for now and cover mild hyperglycemia with NovoLog sliding scale, especially while patient is on dexamethasone.  If persistent elevation does occur, patient may benefit from Metformin administration.  Diabetic education via nutritionist did occur during the stay      Cardiomyopathy (H)-LV EF 40-45%    Assessment: Echocardiogram performed at start of this hospitalization shows decreased left ventricular ejection fraction of 40 to 45% thought to be acute but without signs of volume overload  Cardiomyopathy is suspected secondary to COVID-19 viral infection and/or stress cardiomyopathy with cardiac work-up showing no concerns for ischemic disease, myocarditis.  Patient having slightly increase in non-pitting edema in past few days    Plan: Continue to avoid IV fluids if possible, continue Coreg 3.125 mg twice daily and monitor closely for blood pressure and heart rate tolerance. Will add ACE inhibitor as able going forward.  Provide diuretics prn for any edema, weight gain, signs of volume overload. Patient will need outpatient follow-up with cardiology and repeat echocardiogram following discharge      Left thigh numbness    Assessment: Developing upon awakening on 1/31/22 with exam not following any dermatome but following superficial nerve pattern of the thigh.  Patient did sleep in an atypical position on her left side with a pillow between her legs prior to symptom onset, which could have allowed for pressure on the superficial  nerve with subsequent numbness.  Has now fully resolved    Plan: Continue to monitor for ongoing resolution      Hyponatremia-admit Na 131    Assessment: Present at time of admission, thought secondary to chronic diuretic therapy and poor oral intake, now resolved    Plan: Continue to monitor for ongoing resolution      Hypopotassemia-admit K 3.0    Assessment: Resolved following supplementation    Plan: Continue to monitor and supplement as indicated      Abnormal transaminases    Assessment: Mildly elevated, thought secondary to COVID-19 infection and trending downward    Plan: Continue intermittent monitoring to ensure ongoing improvement as Covid infection resolves      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    Assessment: Noted incidentally on imaging performed at the start of this hospitalization and of unclear clinical significance    Plan: No acute intervention is needed but patient will need outpatient follow-up following recovery from acute illness for further evaluation as appropriate      SIRS (systemic inflammatory response syndrome) (H)-tachycardia, tachypnea, leukocytosis    Assessment: Secondary to COVID-19 pneumonia and pulmonary emboli    Plan: Continue to monitor for ongoing resolution      Lymphopenia due to COVID-19 virus    Assessment: Noted initially, resolving    Plan: No further routine monitoring is needed      LBBB (left bundle branch block)-chronic    Assessment: Still present on EKG    Plan: No acute intervention is needed      Hypertension    Assessment: Chronic, with patient normally on hydrochlorothiazide which has been held during this stay to date due to electrolyte abnormalities.  Blood pressures have been stable even after Coreg initiated     Plan: Continue Coreg as outlined above and monitor blood pressures closely for tolerance      Obesity    Assessment: Chronic and stable    Plan: No acute intervention needed, patient would benefit from weight loss on an outpatient  basis.  Did discuss how this may influence her diabetes diagnosis as well and patient is very motivated to lose weight when she respiratorily recovers       Diet: Moderate Consistent Carb (60 g CHO per Meal) Diet  Room Service  Snacks/Supplements Adult: [employer]ha [employer] Standard Oral Supplement; With Meals  Room Service    DVT Prophylaxis: Eliquis  Gomez Catheter: Not present  Central Lines: None  Cardiac Monitoring: None  Code Status: Full Code      Disposition Plan   Expected Discharge: 02/04/2022     Anticipated discharge location:  Awaiting care coordination huddle  Delays:     Covid Test Positive  Oxygen Needs            The patient's care was discussed with the Bedside Nurse and Patient.    Lo Almonte MD  Hospitalist Service  [facility]  Securely message with the Vocera Web Console (learn more here)  Text page via [employer] Paging/Directory         Clinically Significant Risk Factors Present on Admission                    ______________________________________________________________________    Interval History   Patient has remained vitally stable and essentially unchanged and her FiO2 needs over the past 24 hours with slight increase in her flow noted but reduction in her FiO2 needs.  Appetite continues to slowly improve, energy levels still low.  No new symptoms.  No new nursing concerns.     Data reviewed today: I reviewed all medications, new labs and imaging results over the last 24 hours.    Physical Exam   Vital Signs: Temp: 96.8  F (36  C) Temp src: Oral BP: 121/65 Pulse: 63   Resp: 20 SpO2: 93 % O2 Device: High Flow Nasal Cannula (HFNC) Oxygen Delivery: 40 LPM  Weight: 154 lbs 15.73 oz  Constitutional: awake, alert, cooperative, no apparent distress, and appears stated age  Respiratory: No increased work of breathing, decreased breath sounds without significant crackles or wheezes  Cardiovascular: Regular rate and rhythm without murmur  GI: Bowel sounds present,  abdomen obese but soft and nontender  Skin: no redness, warmth, or swelling and no rashes  Musculoskeletal: no lower extremity pitting edema present  Neurologic: Awake, alert, oriented to name, place and situation    Data   Recent Labs   Lab  02/02/22  1127 02/02/22  0807 02/02/22  0544 02/01/22  0732 02/01/22  0712 01/31/22  0839 01/31/22  0626 01/29/22  0741 01/29/22  0607 01/28/22  0832 01/28/22  0508   WBC   --   --   --   --  6.3  --   --   --  8.9  --  11.6*   HGB   --   --   --   --  12.1  --   --   --  13.4  --  14.1   MCV   --   --   --   --  90  --   --   --  90  --  88   PLT   --   --   --   --  254  --   --   --  266  --  275   NA   --   --  142  --  143  --  144   < >  --    < > 141   POTASSIUM   --   --  3.9  --  4.2  --  3.5   < >  --    < > 4.1   CHLORIDE   --   --  112*  --  113*  --  114*   < >  --    < > 109   CO2   --   --  26  --  24  --  22   < >  --    < > 24   BUN   --   --  18  --  15  --  23   < >  --    < > 47*   CR   --   --  0.63  --  0.58  --  0.63   < >  --    < > 0.70   ANIONGAP   --   --  4  --  6  --  8   < >  --    < > 8   FATUMA   --   --  8.6  --  8.6  --  8.7   < >  --    < > 9.0   GLC  124* 92 122*   < > 97   < > 95   < >  --    < > 156*   ALBUMIN   --   --   --   --  2.2*  --   --   --   --   --   --    PROTTOTAL   --   --   --   --  5.6*  --   --   --   --   --   --    BILITOTAL   --   --   --   --  0.5  --   --   --   --   --   --    ALKPHOS   --   --   --   --  79  --   --   --   --   --   --    ALT   --   --   --   --  62*  --   --   --   --   --   --    AST   --   --   --   --  39  --   --   --   --   --   --     < > = values in this interval not displayed.

## 2022-02-03 LAB
ANION GAP SERPL CALCULATED.3IONS-SCNC: 3 MMOL/L (ref 3–14)
BUN SERPL-MCNC: 21 MG/DL (ref 7–30)
CALCIUM SERPL-MCNC: 8.7 MG/DL (ref 8.5–10.1)
CHLORIDE BLD-SCNC: 112 MMOL/L (ref 94–109)
CO2 SERPL-SCNC: 28 MMOL/L (ref 20–32)
CREAT SERPL-MCNC: 0.64 MG/DL (ref 0.52–1.04)
CRP SERPL-MCNC: 6.5 MG/L (ref 0–8)
GFR SERPL CREATININE-BSD FRML MDRD: >90 ML/MIN/1.73M2
GLUCOSE BLD-MCNC: 98 MG/DL (ref 70–99)
GLUCOSE BLDC GLUCOMTR-MCNC: 102 MG/DL (ref 70–99)
GLUCOSE BLDC GLUCOMTR-MCNC: 135 MG/DL (ref 70–99)
GLUCOSE BLDC GLUCOMTR-MCNC: 151 MG/DL (ref 70–99)
GLUCOSE BLDC GLUCOMTR-MCNC: 90 MG/DL (ref 70–99)
HOLD SPECIMEN: NORMAL
POTASSIUM BLD-SCNC: 3.6 MMOL/L (ref 3.4–5.3)
SODIUM SERPL-SCNC: 143 MMOL/L (ref 133–144)

## 2022-02-03 PROCEDURE — 82310 ASSAY OF CALCIUM: CPT | Performed by: FAMILY MEDICINE

## 2022-02-03 PROCEDURE — 120N000001 HC R&B MED SURG/OB

## 2022-02-03 PROCEDURE — 250N000013 HC RX MED GY IP 250 OP 250 PS 637: Performed by: FAMILY MEDICINE

## 2022-02-03 PROCEDURE — 99207 PR CDG-MDM COMPONENT: MEETS HIGH - UP CODED: CPT | Performed by: PEDIATRICS

## 2022-02-03 PROCEDURE — 99233 SBSQ HOSP IP/OBS HIGH 50: CPT | Performed by: PEDIATRICS

## 2022-02-03 PROCEDURE — 86140 C-REACTIVE PROTEIN: CPT | Performed by: FAMILY MEDICINE

## 2022-02-03 PROCEDURE — 250N000011 HC RX IP 250 OP 636: Performed by: FAMILY MEDICINE

## 2022-02-03 PROCEDURE — 36415 COLL VENOUS BLD VENIPUNCTURE: CPT | Performed by: FAMILY MEDICINE

## 2022-02-03 RX ADMIN — DEXTRAN 70, GLYCERIN, HYPROMELLOSE 1 DROP: 1; 2; 3 SOLUTION/ DROPS OPHTHALMIC at 08:32

## 2022-02-03 RX ADMIN — DEXAMETHASONE 6 MG: 4 TABLET ORAL at 08:30

## 2022-02-03 RX ADMIN — APIXABAN 10 MG: 5 TABLET, FILM COATED ORAL at 08:31

## 2022-02-03 RX ADMIN — ALBUTEROL SULFATE 2 PUFF: 90 AEROSOL, METERED RESPIRATORY (INHALATION) at 03:14

## 2022-02-03 RX ADMIN — APIXABAN 10 MG: 5 TABLET, FILM COATED ORAL at 22:13

## 2022-02-03 RX ADMIN — DEXTRAN 70, GLYCERIN, HYPROMELLOSE 1 DROP: 1; 2; 3 SOLUTION/ DROPS OPHTHALMIC at 22:19

## 2022-02-03 RX ADMIN — DEXTRAN 70, GLYCERIN, HYPROMELLOSE 1 DROP: 1; 2; 3 SOLUTION/ DROPS OPHTHALMIC at 14:45

## 2022-02-03 ASSESSMENT — ACTIVITIES OF DAILY LIVING (ADL)
ADLS_ACUITY_SCORE: 9
ADLS_ACUITY_SCORE: 11
ADLS_ACUITY_SCORE: 9
ADLS_ACUITY_SCORE: 9
ADLS_ACUITY_SCORE: 11
ADLS_ACUITY_SCORE: 11
ADLS_ACUITY_SCORE: 9
ADLS_ACUITY_SCORE: 7
ADLS_ACUITY_SCORE: 7
ADLS_ACUITY_SCORE: 11
ADLS_ACUITY_SCORE: 9
ADLS_ACUITY_SCORE: 11
ADLS_ACUITY_SCORE: 11

## 2022-02-03 ASSESSMENT — MIFFLIN-ST. JEOR: SCORE: 1200.25

## 2022-02-03 NOTE — PROGRESS NOTES
The HFNC was applied to the pt @ 40LPM and 50% for PEEP therapy. Skin is good, clean and dry.   Pt states she breathes better on her left side.     1400 Pt now on 35l/m 40%.    1500 Pt now on 4l/m NC SATs 96%.    Rt will follow.

## 2022-02-03 NOTE — PROGRESS NOTES
CLINICAL NUTRITION SERVICES - REASSESSMENT NOTE     Nutrition Prescription    RECOMMENDATIONS FOR MDs/PROVIDERS TO ORDER:  None at this time     Malnutrition Status:    Unable to determine due to lack of filling all parameters needed for diagnosis. See malnutrition section below.     Recommendations already ordered by Registered Dietitian (RD):  None additionally     Future/Additional Recommendations:  Monitor PO intake, supplement tolerance, weight trends, labs      EVALUATION OF THE PROGRESS TOWARD GOALS   Diet: Moderate Consistent Carb (60 g CHO per Meal) Diet  Room Service  Snacks/Supplements Adult: [employer] Standard Oral Supplement; With Meals  Room Service    Intake: Patient consuming % of meals over past week. Appetite improving. Patient enjoys [employer] supplement. Daughter also bringing in supplements from home to help with intake.      NEW FINDINGS   Weight: 79.4 kg (1/26), 70.2 kg (2/3) -- Based on weights data, weight down since admission. However, per rounding ICU bed scale vs standing scale inconsistent (Patient transferred out of ICU on 1/30). Unable to assess weight trends.   Labs: Reviewed   Meds: Reviewed   GI: Last BM 2/1  Resp: HFNC 40 LPM 50% FiO2  Other: New diabetes diagnosis this admission. Diabetes diet ed done 2/1.     MALNUTRITION  % Intake: Decreased intake does not meet criteria  % Weight Loss: Unable to assess  Subcutaneous Fat Loss: Unable to assess  Muscle Loss: Unable to assess  Fluid Accumulation/Edema: None noted  Malnutrition Diagnosis: Unable to determine due to lack of filling all parameters needed for diagnosis.     Previous Goals   Diet adv v nutrition support within 2-3 days.  Evaluation: Met    Previous Nutrition Diagnosis  Inadequate oral intake related to increased respiratory needs requiring bipap as evidenced by pt with NPO diet order with no oral intake since admission.     Evaluation: Resolved    CURRENT NUTRITION DIAGNOSIS  Predicted inadequate nutrient  intake (protein/energy) related to potential for course of hospitalization to affect intake, LOS      INTERVENTIONS  Implementation  Collaboration with other providers- IDT rounds     Goals  Patient to consume % of nutritionally adequate meal trays TID, or the equivalent with supplements/snacks.    Monitoring/Evaluation  Progress toward goals will be monitored and evaluated per protocol.    Omar Mccabe RDN, LD  Clinical Dietitian   Office: [phone]  Weekend Pager: [phone]

## 2022-02-03 NOTE — PLAN OF CARE
HFNC 50% and 40L, Left side lungs clear, R. Side lungs diminished, vitals stable, alert and oriented x4. Denies pain. No current concerns. Will continue to monitor.

## 2022-02-03 NOTE — PROGRESS NOTES
[facility]    Medicine Progress Note - Hospitalist Service    Date of Admission:  1/26/2022    Assessment & Plan          Patient is a 66-year-old female without known significant past medical history admitted with COVID-19 pneumonia, bilateral pulmonary emboli, acute hypoxic respiratory failure and systemic inflammatory response syndrome.  Patient was initially critically ill and required ICU management with BiPAP.  During this hospitalization patient has been discovered to have a newly diagnosed cardiomyopathy with EF of 40 to 45% and also has been diagnosed with type 2 diabetes with a hemoglobin A1c of 6.5.      Principal Problem:    Pneumonia due to 2019 novel coronavirus    Assessment: has not been vaccinated, time of symptom onset unclear, positive PCR test on 1/26,  received Actemra on 1/27 and did require BiPAP, overall improving    Plan:   - Continue dexamethasone, day 8 of 10    Active Problems:    Multiple subsegmental pulmonary emboli without acute cor pulmonale (H)    Assessment: Bilateral PE present at admission, hemodynamically stable, previously treated with heparin infusion but was transitioned to Eliquis on 1/30/22, improving    Plan: Continue with loading doses of Eliquis at this time followed by ongoing lower dose Eliquis course for PE, anticipate at least 3 months treatment course      Acute respiratory failure with hypoxia (H)    Assessment: Secondary to COVID-19 pneumonia and pulmonary emboli as outlined above, trending towards improvement slowly, required BiPAP in ICU initially now stable on high flow nasal cannula for several days with oxygenation appearing to trend toward improvement    Plan: Continue high flow nasal cannula oxygen supplementation, titrate down on oxygen supplementation as able      New onset type 2 diabetes mellitus (H)-A1c 6.5      Assessment: Patient not currently on any medication scheduled with blood sugars fairly well controlled with  moderate carbohydrate diet alone even with dexamethasone administration.  Patient would really like to avoid medication for this and is very committed to diet and lifestyle modification going forward, diabetic education via nutritionist did occur during the stay    Plan: Continue NovoLog sliding scale, especially while patient is on dexamethasone      Cardiomyopathy (H)-LV EF 40-45%    Assessment: Echocardiogram performed at start of this hospitalization shows decreased left ventricular ejection fraction of 40 to 45% thought to be acute but without signs of volume overload.  Cardiomyopathy is suspected probably secondary to COVID-19 viral infection and/or stress cardiomyopathy with cardiac work-up showing no concerns for ischemic disease, myocarditis.  No definite signs heart failure so far.  Unable to tolerate Coreg reliably due to low blood pressure.    Plan:  discontinue Coreg due to low blood pressure but could reassess if blood pressure trends upward, utilize diuretics prn for signs of acute heart failure.  Anticipate outpatient follow-up with cardiology and repeat echocardiogram following discharge      Left thigh numbness    Assessment: Developing upon awakening on 1/31/22 with exam not following any dermatome but following superficial nerve pattern of the thigh.  Patient did sleep in an atypical position on her left side with a pillow between her legs prior to symptom onset, which could have allowed for pressure on the superficial nerve with subsequent numbness.  Has now fully resolved    Plan: Continue to monitor       Hyponatremia-admit Na 131    Assessment: Present at time of admission, thought secondary to chronic diuretic therapy and poor oral intake, now resolved    Plan: Continue to monitor      Hypopotassemia-admit K 3.0    Assessment: Resolved    Plan: Continue to monitor and supplement as indicated      Abnormal transaminases    Assessment: Mildly elevated, thought secondary to COVID-19 infection  and trending downward    Plan: Continue intermittent monitoring to ensure ongoing improvement as Covid infection resolves      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    Assessment: Noted incidentally on imaging performed at the start of this hospitalization and of unclear clinical significance    Plan: No acute intervention is needed but patient will need outpatient follow-up following recovery from acute illness for further evaluation as appropriate      SIRS (systemic inflammatory response syndrome) (H)-tachycardia, tachypnea, leukocytosis    Assessment: Secondary to COVID-19 pneumonia and pulmonary emboli, resolved    Plan: Continue to monitor      Lymphopenia due to COVID-19 virus    Assessment: Noted initially, resolving    Plan: No further routine monitoring is needed      LBBB (left bundle branch block)-chronic    Assessment: Still present on EKG    Plan: No acute intervention is needed      Hypertension    Assessment: Chronic, with patient normally on hydrochlorothiazide which has been held during this stay to date due to electrolyte abnormalities.  Blood pressures have been stable even after Coreg initiated     Plan: Discontinuing Coreg today because of low normal blood pressures      Obesity    Assessment: Chronic and stable    Plan: No acute intervention needed, patient would benefit from weight loss on an outpatient basis.         Diet: Moderate Consistent Carb (60 g CHO per Meal) Diet  Room Service  Snacks/Supplements Adult: Radha Malloy Standard Oral Supplement; With Meals  Room Service    DVT Prophylaxis: Eliquis  Gomez Catheter: Not present  Central Lines: None  Cardiac Monitoring: None  Code Status: Full Code      Disposition Plan   Expected Discharge: 02/07/2022     Anticipated discharge location:  Awaiting care coordination huddle  Delays:     Covid Test Positive  Oxygen Needs            The patient's care was discussed with the Patient.    Parker Caraballo MD  Hospitalist Service    [facility]  Securely message with the [employer] Web Console (learn more here)  Text page via [employer] Paging/Directory         Clinically Significant Risk Factors Present on Admission                    ______________________________________________________________________    Interval History   She thinks she feels better today.  She is less short of breath.  She has no new complaints.  She is afebrile and hemodynamically stable although she had low normal blood pressure this morning when she was due for a dose of Coreg, so it was not administered.  Oxygenation has been stable on high flow nasal cannula, trending toward improvement.  Urine output has been good.  She is tolerating improved oral intake.    Data reviewed today: I reviewed all medications, new labs and imaging results over the last 24 hours. I personally reviewed no images or EKG's today.    Physical Exam   Vital Signs: Temp: 97.6  F (36.4  C) Temp src: Oral BP: 97/48 Pulse: 62   Resp: 20 SpO2: 94 % O2 Device: High Flow Nasal Cannula (HFNC) Oxygen Delivery: (S) 30 LPM FiO2 50%  Patient Vitals for the past 24 hrs:   BP Temp Temp src Pulse Resp SpO2 Weight   02/03/22 0635 -- -- -- -- -- 94 % --   02/03/22 0634 -- -- -- -- -- 91 % --   02/03/22 0633 -- -- -- -- -- (!) 88 % --   02/03/22 0632 -- -- -- -- -- (!) 87 % --   02/03/22 0629 97/48 97.6  F (36.4  C) Oral 62 20 (!) 87 % --   02/03/22 0600 -- -- -- -- -- 99 % --   02/03/22 0315 -- -- -- -- -- 90 % --   02/03/22 0314 -- -- -- -- -- 91 % --   02/03/22 0309 -- -- -- -- -- (!) 86 % --   02/03/22 0308 -- -- -- -- -- (!) 84 % --   02/03/22 0300 117/65 97.3  F (36.3  C) Oral 64 20 90 % 70.2 kg (154 lb 12.2 oz)   02/02/22 2220 104/54 97.2  F (36.2  C) Oral 62 18 94 % --   02/02/22 2000 -- -- -- -- -- 95 % --   02/02/22 1900 110/58 (!) 96.6  F (35.9  C) Oral 61 -- 94 % --   02/02/22 1800 -- -- -- -- -- 95 % --   02/02/22 1700 -- -- -- -- -- 93 % --   02/02/22 1608 -- -- -- -- -- 91 %  --   02/02/22 1606 -- -- -- -- -- 91 % --   02/02/22 1600 133/68 -- -- -- -- (!) 89 % --   02/02/22 1555 133/68 98.1  F (36.7  C) Oral 79 18 92 % --   02/02/22 1500 -- -- -- -- -- 94 % --   02/02/22 1400 -- -- -- -- -- 92 % --   02/02/22 1335 -- -- -- -- -- 93 % --   02/02/22 1300 -- -- -- -- -- 95 % --     Weight: 154 lbs 12.21 oz   Vitals:    01/29/22 0600 01/30/22 0400 02/01/22 0015 02/02/22 0600   Weight: 79.3 kg (174 lb 13.2 oz) 79.6 kg (175 lb 8 oz) 69.7 kg (153 lb 11.2 oz) 70.3 kg (154 lb 15.7 oz)    02/03/22 0300   Weight: 70.2 kg (154 lb 12.2 oz)       Intake/Output Summary (Last 24 hours) at 2/3/2022 1202  Last data filed at 2/3/2022 0835  Gross per 24 hour   Intake 1280 ml   Output 2400 ml   Net -1120 ml     Cumulative I/O 3.6L negative for hospitalization    General Appearance: Appears comfortable sitting up in bed eating lunch, able to speak full sentences  Respiratory: Normal respiratory effort, clear lungs  Cardiovascular: Regular rate and rhythm  Other: Alert and oriented    Data   Recent Labs   Lab 02/03/22  0738 02/03/22  0544 02/02/22  2119 02/02/22  0807 02/02/22  0544 02/01/22  0732 02/01/22  0712 01/29/22  0741 01/29/22  0607 01/28/22  0832 01/28/22  0508   WBC  --   --   --   --   --   --  6.3  --  8.9  --  11.6*   HGB  --   --   --   --   --   --  12.1  --  13.4  --  14.1   MCV  --   --   --   --   --   --  90  --  90  --  88   PLT  --   --   --   --   --   --  254  --  266  --  275   NA  --  143  --   --  142  --  143   < >  --    < > 141   POTASSIUM  --  3.6  --   --  3.9  --  4.2   < >  --    < > 4.1   CHLORIDE  --  112*  --   --  112*  --  113*   < >  --    < > 109   CO2  --  28  --   --  26  --  24   < >  --    < > 24   BUN  --  21  --   --  18  --  15   < >  --    < > 47*   CR  --  0.64  --   --  0.63  --  0.58   < >  --    < > 0.70   ANIONGAP  --  3  --   --  4  --  6   < >  --    < > 8   FATUMA  --  8.7  --   --  8.6  --  8.6   < >  --    < > 9.0   GLC 90 98 128*   < > 122*   < > 97   <  >  --    < > 156*   ALBUMIN  --   --   --   --   --   --  2.2*  --   --   --   --    PROTTOTAL  --   --   --   --   --   --  5.6*  --   --   --   --    BILITOTAL  --   --   --   --   --   --  0.5  --   --   --   --    ALKPHOS  --   --   --   --   --   --  79  --   --   --   --    ALT  --   --   --   --   --   --  62*  --   --   --   --    AST  --   --   --   --   --   --  39  --   --   --   --     < > = values in this interval not displayed.     Blood sugars range  over the last day  CRP 6.5, improved from 9.9    Medications     - MEDICATION INSTRUCTIONS -       - MEDICATION INSTRUCTIONS -         apixaban ANTICOAGULANT  10 mg Oral BID    Followed by     [START ON 2/6/2022] apixaban ANTICOAGULANT  5 mg Oral BID     artificial tears ophthalmic solution  1 drop Both Eyes TID     [Held by provider] carvedilol  3.125 mg Oral BID w/meals     dexamethasone  6 mg Oral Daily     insulin aspart  1-7 Units Subcutaneous TID AC     insulin aspart  1-5 Units Subcutaneous At Bedtime

## 2022-02-04 LAB
ERYTHROCYTE [DISTWIDTH] IN BLOOD BY AUTOMATED COUNT: 14.6 % (ref 10–15)
GLUCOSE BLDC GLUCOMTR-MCNC: 101 MG/DL (ref 70–99)
GLUCOSE BLDC GLUCOMTR-MCNC: 121 MG/DL (ref 70–99)
GLUCOSE BLDC GLUCOMTR-MCNC: 146 MG/DL (ref 70–99)
GLUCOSE BLDC GLUCOMTR-MCNC: 174 MG/DL (ref 70–99)
HCT VFR BLD AUTO: 38.7 % (ref 35–47)
HGB BLD-MCNC: 12.4 G/DL (ref 11.7–15.7)
HOLD SPECIMEN: NORMAL
MCH RBC QN AUTO: 29.8 PG (ref 26.5–33)
MCHC RBC AUTO-ENTMCNC: 32 G/DL (ref 31.5–36.5)
MCV RBC AUTO: 93 FL (ref 78–100)
PLATELET # BLD AUTO: 251 10E3/UL (ref 150–450)
POTASSIUM BLD-SCNC: 3.9 MMOL/L (ref 3.4–5.3)
RBC # BLD AUTO: 4.16 10E6/UL (ref 3.8–5.2)
WBC # BLD AUTO: 7.1 10E3/UL (ref 4–11)

## 2022-02-04 PROCEDURE — 84132 ASSAY OF SERUM POTASSIUM: CPT | Performed by: FAMILY MEDICINE

## 2022-02-04 PROCEDURE — 250N000011 HC RX IP 250 OP 636: Performed by: FAMILY MEDICINE

## 2022-02-04 PROCEDURE — 99207 PR CDG-MDM COMPONENT: MEETS HIGH - UP CODED: CPT | Performed by: PEDIATRICS

## 2022-02-04 PROCEDURE — 120N000001 HC R&B MED SURG/OB

## 2022-02-04 PROCEDURE — 99233 SBSQ HOSP IP/OBS HIGH 50: CPT | Performed by: PEDIATRICS

## 2022-02-04 PROCEDURE — 250N000013 HC RX MED GY IP 250 OP 250 PS 637: Performed by: FAMILY MEDICINE

## 2022-02-04 PROCEDURE — 36415 COLL VENOUS BLD VENIPUNCTURE: CPT | Performed by: FAMILY MEDICINE

## 2022-02-04 PROCEDURE — 85014 HEMATOCRIT: CPT | Performed by: FAMILY MEDICINE

## 2022-02-04 RX ADMIN — DEXTRAN 70, GLYCERIN, HYPROMELLOSE 1 DROP: 1; 2; 3 SOLUTION/ DROPS OPHTHALMIC at 14:12

## 2022-02-04 RX ADMIN — DEXTRAN 70, GLYCERIN, HYPROMELLOSE 1 DROP: 1; 2; 3 SOLUTION/ DROPS OPHTHALMIC at 08:41

## 2022-02-04 RX ADMIN — DEXAMETHASONE 6 MG: 4 TABLET ORAL at 08:41

## 2022-02-04 RX ADMIN — APIXABAN 10 MG: 5 TABLET, FILM COATED ORAL at 08:41

## 2022-02-04 RX ADMIN — APIXABAN 10 MG: 5 TABLET, FILM COATED ORAL at 21:33

## 2022-02-04 RX ADMIN — DEXTRAN 70, GLYCERIN, HYPROMELLOSE 1 DROP: 1; 2; 3 SOLUTION/ DROPS OPHTHALMIC at 21:36

## 2022-02-04 ASSESSMENT — ACTIVITIES OF DAILY LIVING (ADL)
ADLS_ACUITY_SCORE: 6
ADLS_ACUITY_SCORE: 7
ADLS_ACUITY_SCORE: 6
ADLS_ACUITY_SCORE: 7
ADLS_ACUITY_SCORE: 6
ADLS_ACUITY_SCORE: 7
ADLS_ACUITY_SCORE: 7
ADLS_ACUITY_SCORE: 6
ADLS_ACUITY_SCORE: 7
ADLS_ACUITY_SCORE: 7
ADLS_ACUITY_SCORE: 6
ADLS_ACUITY_SCORE: 7
ADLS_ACUITY_SCORE: 6
ADLS_ACUITY_SCORE: 7
ADLS_ACUITY_SCORE: 7

## 2022-02-04 ASSESSMENT — MIFFLIN-ST. JEOR: SCORE: 1202.23

## 2022-02-04 NOTE — PLAN OF CARE
Problem: Adult Inpatient Plan of Care  Goal: Plan of Care Review  Outcome: Improving  Flowsheets (Taken 2/4/2022 1602)  Plan of Care Reviewed With: patient  Progress: improving

## 2022-02-04 NOTE — PROGRESS NOTES
[facility]    Medicine Progress Note - Hospitalist Service    Date of Admission:  1/26/2022    Assessment & Plan          Patient is a 66-year-old female without known significant past medical history admitted with COVID-19 pneumonia, bilateral pulmonary emboli, acute hypoxic respiratory failure and systemic inflammatory response syndrome.  Patient was initially critically ill and required ICU management with BiPAP.  During this hospitalization patient has been discovered to have a newly diagnosed cardiomyopathy with EF of 40 to 45% and also has been diagnosed with type 2 diabetes with a hemoglobin A1c of 6.5.      Principal Problem:    Pneumonia due to 2019 novel coronavirus    Assessment: has not been vaccinated, time of symptom onset unclear, positive PCR test on 1/26,  received Actemra on 1/27 and did require BiPAP, overall improving    Plan:   - Continue dexamethasone, dose 9 of 10 (missed a dose on 1/30/22)  -Will need at least 20 days isolation per current system guidelines, day 10 today    Active Problems:    Multiple subsegmental pulmonary emboli without acute cor pulmonale (H)    Assessment: Bilateral PE present at admission, hemodynamically stable, previously treated with heparin infusion but was transitioned to Eliquis on 1/30/22, improving    Plan: Continue with loading doses of Eliquis at this time followed by ongoing lower dose Eliquis course for PE, anticipate at least 3 months treatment course      Acute respiratory failure with hypoxia (H)    Assessment: Secondary to COVID-19 pneumonia and pulmonary emboli as outlined above, trending towards improvement slowly, required BiPAP in ICU initially, then stabilized on high flow nasal cannula for several days with oxygenation continuing to trend toward improvement and has now transitioned to regular nasal cannula oxygen supplementation since about 3 PM yesterday afternoon    Plan: Continue low flow nasal cannula oxygen  supplementation titrated to keep oxygen saturations within the target range of 90 to 96%, RT consulted to assist with evaluation about specific patient request for possible respiratory DME after discharge      New onset type 2 diabetes mellitus (H)-A1c 6.5      Assessment: Patient not currently on any medication scheduled with blood sugars fairly well controlled with moderate carbohydrate diet alone even with dexamethasone administration.  Patient would really like to avoid medication for this and is very committed to diet and lifestyle modification going forward, diabetic education via nutritionist did occur during the stay    Plan: Continue NovoLog sliding scale, especially while patient is on dexamethasone      Cardiomyopathy (H)-LV EF 40-45%    Assessment: Echocardiogram performed at start of this hospitalization shows decreased left ventricular ejection fraction of 40 to 45% thought to be acute but without signs of volume overload.  Cardiomyopathy is suspected probably secondary to COVID-19 viral infection and/or stress cardiomyopathy with cardiac work-up showing no concerns for ischemic disease, myocarditis.  No definite signs heart failure so far.  Unable to tolerate Coreg previously due to low blood pressure.    Plan:  Coreg discontinued due to low blood pressure but could reassess if blood pressure trends upward, utilize diuretics prn for signs of acute heart failure.  Anticipate outpatient follow-up with cardiology and repeat echocardiogram following discharge      Left thigh numbness    Assessment: Developing upon awakening on 1/31/22 with exam not following any dermatome but following superficial nerve pattern of the thigh.  Patient did sleep in an atypical position on her left side with a pillow between her legs prior to symptom onset, which could have allowed for pressure on the superficial nerve with subsequent numbness.  Has now fully resolved    Plan: Continue to monitor       Hyponatremia-admit Na  131    Assessment: Present at time of admission, thought secondary to chronic diuretic therapy and poor oral intake, now resolved    Plan: Continue to monitor      Hypopotassemia-admit K 3.0    Assessment: Resolved    Plan: Continue to monitor and supplement as indicated      Abnormal transaminases    Assessment: Mildly elevated, thought secondary to COVID-19 infection and trending downward    Plan: Continue intermittent monitoring to ensure ongoing improvement as Covid infection resolves      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    Assessment: Noted incidentally on imaging performed at the start of this hospitalization and of unclear clinical significance    Plan: No acute intervention is needed but patient will need outpatient follow-up following recovery from acute illness for further evaluation as appropriate      SIRS (systemic inflammatory response syndrome) (H)-tachycardia, tachypnea, leukocytosis    Assessment: Secondary to COVID-19 pneumonia and pulmonary emboli, resolved    Plan: Continue to monitor      Lymphopenia due to COVID-19 virus    Assessment: Noted initially, resolving    Plan: No further routine monitoring is needed      LBBB (left bundle branch block)-chronic    Assessment: Still present on EKG    Plan: No acute intervention is needed      Hypertension    Assessment: Chronic, with patient normally on hydrochlorothiazide which has been held during this stay to date due to electrolyte abnormalities.  Blood pressures have been stable even after Coreg initiated     Plan: Discontinuing Coreg today because of low normal blood pressures      Obesity    Assessment: Chronic and stable    Plan: No acute intervention needed, patient would benefit from weight loss on an outpatient basis.           Diet: Moderate Consistent Carb (60 g CHO per Meal) Diet  Room Service  Snacks/Supplements Adult: Radha Malloy Standard Oral Supplement; With Meals  Room Service    DVT Prophylaxis: DOAC  Patricia  Catheter: Not present  Central Lines: None  Cardiac Monitoring: None  Code Status: Full Code      Disposition Plan   Expected Discharge: 02/07/2022     Anticipated discharge location:  Awaiting care coordination huddle  Delays:     Covid Test Positive  Oxygen Needs            The patient's care was discussed with the Patient.    Parker Caraballo MD  Hospitalist Service  [facility]  Securely message with the [employer] Web Console (learn more here)  Text page via [employer] Paging/Directory         Clinically Significant Risk Factors Present on Admission                    ______________________________________________________________________    Interval History   Patient says she is feeling better today.  At about 3 PM yesterday afternoon, she was able to transition from high flow to regular low flow nasal cannula oxygen supplementation.  She has maintained adequate oxygen saturations on 4 to 5 L nasal cannula since then.  She remains afebrile and hemodynamically stable.  Urine output continues to be good.  She is eating better.  She has no new complaints.  However, she does have a question about medical equipment.  She wonders whether she would qualify to have a piece of medical equipment at home to assist with her respiratory care after hospital discharge.  She does have additional information regarding that piece of medical equipment.    Data reviewed today: I reviewed all medications, new labs and imaging results over the last 24 hours. I personally reviewed no images or EKG's today.    Physical Exam   Vital Signs: Temp: 97.2  F (36.2  C) Temp src: Oral BP: (!) 142/73 Pulse: 87   Resp: 20 SpO2: 90 % O2 Device: Nasal cannula Oxygen Delivery: 5 LPM  Weight: 155 lbs 3.2 oz  General Appearance: Appears to be breathing comfortably, no apparent distress  Respiratory: Normal respiratory effort, symmetric breath sounds, clear lungs  Cardiovascular: Regular rate and rhythm  Other: Alert and  conversive    Data   Recent Labs   Lab 02/04/22  1135 02/04/22  0725 02/04/22  0609 02/03/22  2207 02/03/22  0738 02/03/22  0544 02/02/22  0807 02/02/22  0544 02/01/22  0732 02/01/22  0712 01/29/22  0741 01/29/22  0607   WBC  --   --  7.1  --   --   --   --   --   --  6.3  --  8.9   HGB  --   --  12.4  --   --   --   --   --   --  12.1  --  13.4   MCV  --   --  93  --   --   --   --   --   --  90  --  90   PLT  --   --  251  --   --   --   --   --   --  254  --  266   NA  --   --   --   --   --  143  --  142  --  143   < >  --    POTASSIUM  --   --  3.9  --   --  3.6  --  3.9  --  4.2   < >  --    CHLORIDE  --   --   --   --   --  112*  --  112*  --  113*   < >  --    CO2  --   --   --   --   --  28  --  26  --  24   < >  --    BUN  --   --   --   --   --  21  --  18  --  15   < >  --    CR  --   --   --   --   --  0.64  --  0.63  --  0.58   < >  --    ANIONGAP  --   --   --   --   --  3  --  4  --  6   < >  --    FATUMA  --   --   --   --   --  8.7  --  8.6  --  8.6   < >  --    * 101*  --  102*   < > 98   < > 122*   < > 97   < >  --    ALBUMIN  --   --   --   --   --   --   --   --   --  2.2*  --   --    PROTTOTAL  --   --   --   --   --   --   --   --   --  5.6*  --   --    BILITOTAL  --   --   --   --   --   --   --   --   --  0.5  --   --    ALKPHOS  --   --   --   --   --   --   --   --   --  79  --   --    ALT  --   --   --   --   --   --   --   --   --  62*  --   --    AST  --   --   --   --   --   --   --   --   --  39  --   --     < > = values in this interval not displayed.     Blood sugars ranged 101-151 over the last day    Medications     - MEDICATION INSTRUCTIONS -       - MEDICATION INSTRUCTIONS -         apixaban ANTICOAGULANT  10 mg Oral BID    Followed by     [START ON 2/6/2022] apixaban ANTICOAGULANT  5 mg Oral BID     artificial tears ophthalmic solution  1 drop Both Eyes TID     [Held by provider] carvedilol  3.125 mg Oral BID w/meals     dexamethasone  6 mg Oral Daily     insulin aspart   1-7 Units Subcutaneous TID AC     insulin aspart  1-5 Units Subcutaneous At Bedtime

## 2022-02-04 NOTE — PROGRESS NOTES
SPIRITUAL HEALTH SERVICES  [facility]  Progress Note    REFERRAL SOURCE: Self    NOTE: Stacie has been hospitalized for 6 days now.  She has COVID, so I haven't seen her.  Chaplains in the system aren't permitted to see patients with COVID in-person.  As Stacie is on a HFNC and desats with activity, it doesn't seem like a good time to call her yet, either.  Patient reported that she is [employer], upon admission.    PLAN: I will continue to follow patient and be available for phone visits, as needed, as patient recovers, until her discharge.    Charlie Molina, Ph.d,   [facility]  [facility]  [address] [place] [address]    Office: [phone]   [email or web address]

## 2022-02-05 LAB
GLUCOSE BLDC GLUCOMTR-MCNC: 109 MG/DL (ref 70–99)
GLUCOSE BLDC GLUCOMTR-MCNC: 111 MG/DL (ref 70–99)
GLUCOSE BLDC GLUCOMTR-MCNC: 122 MG/DL (ref 70–99)
GLUCOSE BLDC GLUCOMTR-MCNC: 125 MG/DL (ref 70–99)
GLUCOSE BLDC GLUCOMTR-MCNC: 191 MG/DL (ref 70–99)

## 2022-02-05 PROCEDURE — 99233 SBSQ HOSP IP/OBS HIGH 50: CPT | Performed by: FAMILY MEDICINE

## 2022-02-05 PROCEDURE — 99207 PR CDG-MDM COMPONENT: MEETS HIGH - UP CODED: CPT | Performed by: FAMILY MEDICINE

## 2022-02-05 PROCEDURE — 120N000001 HC R&B MED SURG/OB

## 2022-02-05 PROCEDURE — 250N000011 HC RX IP 250 OP 636: Performed by: PEDIATRICS

## 2022-02-05 PROCEDURE — 250N000013 HC RX MED GY IP 250 OP 250 PS 637: Performed by: FAMILY MEDICINE

## 2022-02-05 RX ADMIN — DEXTRAN 70, GLYCERIN, HYPROMELLOSE 1 DROP: 1; 2; 3 SOLUTION/ DROPS OPHTHALMIC at 09:12

## 2022-02-05 RX ADMIN — DEXAMETHASONE 6 MG: 4 TABLET ORAL at 09:12

## 2022-02-05 RX ADMIN — DEXTRAN 70, GLYCERIN, HYPROMELLOSE 1 DROP: 1; 2; 3 SOLUTION/ DROPS OPHTHALMIC at 13:38

## 2022-02-05 RX ADMIN — APIXABAN 10 MG: 5 TABLET, FILM COATED ORAL at 22:20

## 2022-02-05 RX ADMIN — DEXTRAN 70, GLYCERIN, HYPROMELLOSE 1 DROP: 1; 2; 3 SOLUTION/ DROPS OPHTHALMIC at 22:21

## 2022-02-05 RX ADMIN — APIXABAN 10 MG: 5 TABLET, FILM COATED ORAL at 09:12

## 2022-02-05 ASSESSMENT — ACTIVITIES OF DAILY LIVING (ADL)
ADLS_ACUITY_SCORE: 8
ADLS_ACUITY_SCORE: 6
ADLS_ACUITY_SCORE: 8
ADLS_ACUITY_SCORE: 6
ADLS_ACUITY_SCORE: 8
ADLS_ACUITY_SCORE: 6
ADLS_ACUITY_SCORE: 6
ADLS_ACUITY_SCORE: 8
ADLS_ACUITY_SCORE: 8
ADLS_ACUITY_SCORE: 6
ADLS_ACUITY_SCORE: 8
ADLS_ACUITY_SCORE: 8
ADLS_ACUITY_SCORE: 6
ADLS_ACUITY_SCORE: 8
ADLS_ACUITY_SCORE: 8

## 2022-02-05 NOTE — PROGRESS NOTES
Patient this shift tried decreasing oxygen to 3 LPM, and tolerated well when not exerting herself, but required 4-4.5 LPM with exertion. Patient reported having trouble sleeping due to noises, and was offered a quiet pack including ear plugs and patient reported this was helpful. Continues to transfer as standby assist to bedside commode. Has mepilex on coccyx as prophylactic intervention due to patient's preference to lay supine with head elevated and prefers not to lay on her side or change positions. Blood sugar at HS was 146, no insulin given per parameters. Patient reporting some discomfort in right arm IV. Staff flushed IV, flushing without difficulty, patient tolerated well. Will continue to monitor.

## 2022-02-05 NOTE — PROGRESS NOTES
Formerly [facility]    Medicine Progress Note - Hospitalist Service    Date of Admission:  1/26/2022    Assessment & Plan          Patient is a 66-year-old female without known significant past medical history admitted with COVID-19 pneumonia, bilateral pulmonary emboli, acute hypoxic respiratory failure and systemic inflammatory response syndrome.  Patient was initially critically ill and required ICU management with BiPAP but has been progressively improving and is weaned down to 3-5 L NC oxygen over the past 24 hours.  During this hospitalization patient has been discovered to have a newly diagnosed cardiomyopathy with EF of 40 to 45% and also has been diagnosed with type 2 diabetes with a hemoglobin A1c of 6.5.      Principal Problem:    Pneumonia due to 2019 novel coronavirus    Assessment: has not been vaccinated, time of symptom onset unclear, positive PCR test on 1/26,  received Actemra on 1/27 and did require BiPAP, overall improving    Plan:   - Continue dexamethasone, dose 10 of 10 (missed a dose on 1/30/22)  -Will need at least 20 days isolation per current system guidelines, day 10 today  -Continue Eliquis for VTE prophylaxis   -Wean O2 supplementation as able, consider discharge home when patient is on 3L NC or less to maintain saturations 90% or above.      Active Problems:    Multiple subsegmental pulmonary emboli without acute cor pulmonale (H)    Assessment: Bilateral PE present at admission, hemodynamically stable, previously treated with heparin infusion but was transitioned to Eliquis on 1/30/22.    Plan: Continue with loading doses of Eliquis at this time followed by ongoing lower dose Eliquis course for PE, anticipate at least 3 months treatment course      Acute respiratory failure with hypoxia (H)    Assessment: Secondary to COVID-19 pneumonia and pulmonary emboli as outlined above, trending towards improvement slowly.  Patient did required BiPAP in ICU initially, then  stabilized on high flow nasal cannula for several days and has now transitioned to regular nasal cannula oxygen supplementation with slow reduction in oxygen needs    Plan: Continue low flow nasal cannula oxygen supplementation titrated to keep oxygen saturations within the target range of 90 to 96%, RT consulted to assist with evaluation about specific patient request for possible respiratory DME after discharge      New onset type 2 diabetes mellitus (H)-A1c 6.5      Assessment: Patient not currently on any medication scheduled with blood sugars fairly well controlled with moderate carbohydrate diet alone even with dexamethasone administration.  Patient would really like to avoid medication for this and is very committed to diet and lifestyle modification going forward.  Diabetic education via nutritionist did occur during the stay    Plan: Continue NovoLog sliding scale, especially while patient is on dexamethasone.  Continue moderate carb diet now and at time of discharge        Cardiomyopathy (H)-LV EF 40-45%    Assessment: Echocardiogram performed at start of this hospitalization shows decreased left ventricular ejection fraction of 40 to 45% thought to be acute but without signs of volume overload.  Cardiomyopathy is suspected probably secondary to COVID-19 viral infection and/or stress cardiomyopathy with cardiac work-up showing no concerns for ischemic disease, myocarditis.  No definite signs heart failure so far.  Unable to tolerate Coreg previously due to low blood pressure.    Plan:  Coreg discontinued due to low blood pressure but could reassess if blood pressure trends upward, utilize diuretics prn for signs of acute heart failure.  Anticipate outpatient follow-up with cardiology and repeat echocardiogram following discharge      Left thigh numbness    Assessment: Developing upon awakening on 1/31/22 with exam not following any dermatome but following superficial nerve pattern of the thigh.  Patient  did sleep in an atypical position on her left side with a pillow between her legs prior to symptom onset, which could have allowed for pressure on the superficial nerve with subsequent numbness.  Has now fully resolved    Plan: Continue to monitor       Hyponatremia-admit Na 131    Assessment: Present at time of admission, thought secondary to chronic diuretic therapy and poor oral intake, now resolved    Plan: Continue to monitor for ongoing resolution       Hypopotassemia-admit K 3.0    Assessment: Resolved    Plan: Continue to monitor and supplement as indicated      Abnormal transaminases    Assessment: Mildly elevated, thought secondary to COVID-19 infection and trending downward    Plan: Continue intermittent monitoring to ensure ongoing improvement as Covid infection resolves      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    Assessment: Noted incidentally on imaging performed at the start of this hospitalization and of unclear clinical significance    Plan: No acute intervention is needed but patient will need outpatient follow-up following recovery from acute illness for further evaluation as appropriate      SIRS (systemic inflammatory response syndrome) (H)-tachycardia, tachypnea, leukocytosis    Assessment: Secondary to COVID-19 pneumonia and pulmonary emboli, resolved    Plan: Continue to monitor for ongoing resolution      Lymphopenia due to COVID-19 virus    Assessment: Noted initially, resolving    Plan: No further routine monitoring is needed      LBBB (left bundle branch block)-chronic    Assessment: Still present on EKG    Plan: No acute intervention is needed      Hypertension    Assessment: Chronic, with patient normally on hydrochlorothiazide which has been held during this stay to date due to electrolyte abnormalities.  Blood pressures have been in the low normal range and attempted to initiate Coreg due to cardiomyopathy as above but blood pressures could not tolerate    Plan:  Continue to hold all antihypertensive agents at this time and monitor for stability      Obesity    Assessment: Chronic and stable    Plan: No acute intervention needed, patient would benefit from weight loss on an outpatient basis.         Diet: Moderate Consistent Carb (60 g CHO per Meal) Diet  Room Service  Snacks/Supplements Adult: Radha Malloy Standard Oral Supplement; With Meals  Room Service    DVT Prophylaxis: Eliquis  Gomez Catheter: Not present  Central Lines: None  Cardiac Monitoring: None  Code Status: Full Code      Disposition Plan   Expected Discharge: 02/07/2022     Anticipated discharge location:  Awaiting care coordination huddle  Delays:     Covid Test Positive  Oxygen Needs            The patient's care was discussed with the Bedside Nurse and Patient.    Lo Almonte MD  Hospitalist Service  [facility]  Securely message with the Vocera Web Console (learn more here)  Text page via "[employer] Paging/Directory         Clinically Significant Risk Factors Present on Admission                    ______________________________________________________________________    Interval History   Patient has remained vitally stable and has been able to wean from 5 L down to 3 L nasal cannula oxygen at rest over the past 24 hours, has needed to increase to 4 to 5 L with activity.  Appetite and oral intake continue to improve and patient denies any new symptoms.  No new nursing concerns.    Data reviewed today: I reviewed all medications, new labs and imaging results over the last 24 hours.    Physical Exam   Vital Signs: Temp: 97.6  F (36.4  C) Temp src: Oral BP: 112/60 Pulse: 75   Resp: 17 SpO2: 92 % O2 Device: Nasal cannula Oxygen Delivery: 3 LPM  Weight: 155 lbs 3.2 oz  Constitutional: awake, alert, cooperative, no apparent distress, and appears stated age  Respiratory: No increased work of breathing, slightly decreased air exchange, clear to auscultation bilaterally, no  crackles or wheezing  Cardiovascular: Regular rate and rhythm without murmur  GI: Bowel sounds present, abdomen is obese but soft, nondistended, nontender to palpation  Skin: no redness, warmth, or swelling and no rashes  Musculoskeletal: no lower extremity pitting edema present  Neurologic: Awake, alert, oriented to name, place and situation    Data   Recent Labs   Lab 02/05/22  1653 02/05/22  1213 02/05/22  1132 02/04/22  0725 02/04/22  0609 02/03/22  0738 02/03/22  0544 02/02/22  0807 02/02/22  0544 02/01/22  0732 02/01/22  0712   WBC  --   --   --   --  7.1  --   --   --   --   --  6.3   HGB  --   --   --   --  12.4  --   --   --   --   --  12.1   MCV  --   --   --   --  93  --   --   --   --   --  90   PLT  --   --   --   --  251  --   --   --   --   --  254   NA  --   --   --   --   --   --  143  --  142  --  143   POTASSIUM  --   --   --   --  3.9  --  3.6  --  3.9  --  4.2   CHLORIDE  --   --   --   --   --   --  112*  --  112*  --  113*   CO2  --   --   --   --   --   --  28  --  26  --  24   BUN  --   --   --   --   --   --  21  --  18  --  15   CR  --   --   --   --   --   --  0.64  --  0.63  --  0.58   ANIONGAP  --   --   --   --   --   --  3  --  4  --  6   FATUMA  --   --   --   --   --   --  8.7  --  8.6  --  8.6   * 125* 109*   < >  --    < > 98   < > 122*   < > 97   ALBUMIN  --   --   --   --   --   --   --   --   --   --  2.2*   PROTTOTAL  --   --   --   --   --   --   --   --   --   --  5.6*   BILITOTAL  --   --   --   --   --   --   --   --   --   --  0.5   ALKPHOS  --   --   --   --   --   --   --   --   --   --  79   ALT  --   --   --   --   --   --   --   --   --   --  62*   AST  --   --   --   --   --   --   --   --   --   --  39    < > = values in this interval not displayed.

## 2022-02-06 LAB
ALBUMIN SERPL-MCNC: 2.9 G/DL (ref 3.4–5)
ALP SERPL-CCNC: 72 U/L (ref 40–150)
ALT SERPL W P-5'-P-CCNC: 193 U/L (ref 0–50)
ANION GAP SERPL CALCULATED.3IONS-SCNC: 3 MMOL/L (ref 3–14)
AST SERPL W P-5'-P-CCNC: 54 U/L (ref 0–45)
BILIRUB SERPL-MCNC: 0.5 MG/DL (ref 0.2–1.3)
BUN SERPL-MCNC: 20 MG/DL (ref 7–30)
CALCIUM SERPL-MCNC: 9.2 MG/DL (ref 8.5–10.1)
CHLORIDE BLD-SCNC: 110 MMOL/L (ref 94–109)
CO2 SERPL-SCNC: 29 MMOL/L (ref 20–32)
CREAT SERPL-MCNC: 0.56 MG/DL (ref 0.52–1.04)
GFR SERPL CREATININE-BSD FRML MDRD: >90 ML/MIN/1.73M2
GLUCOSE BLD-MCNC: 92 MG/DL (ref 70–99)
GLUCOSE BLDC GLUCOMTR-MCNC: 113 MG/DL (ref 70–99)
GLUCOSE BLDC GLUCOMTR-MCNC: 118 MG/DL (ref 70–99)
GLUCOSE BLDC GLUCOMTR-MCNC: 132 MG/DL (ref 70–99)
GLUCOSE BLDC GLUCOMTR-MCNC: 96 MG/DL (ref 70–99)
HOLD SPECIMEN: NORMAL
HOLD SPECIMEN: NORMAL
POTASSIUM BLD-SCNC: 4.1 MMOL/L (ref 3.4–5.3)
PROT SERPL-MCNC: 6.5 G/DL (ref 6.8–8.8)
SODIUM SERPL-SCNC: 142 MMOL/L (ref 133–144)

## 2022-02-06 PROCEDURE — 36415 COLL VENOUS BLD VENIPUNCTURE: CPT | Performed by: FAMILY MEDICINE

## 2022-02-06 PROCEDURE — 80053 COMPREHEN METABOLIC PANEL: CPT | Performed by: FAMILY MEDICINE

## 2022-02-06 PROCEDURE — 120N000001 HC R&B MED SURG/OB

## 2022-02-06 PROCEDURE — 99232 SBSQ HOSP IP/OBS MODERATE 35: CPT | Performed by: FAMILY MEDICINE

## 2022-02-06 PROCEDURE — 250N000013 HC RX MED GY IP 250 OP 250 PS 637: Performed by: FAMILY MEDICINE

## 2022-02-06 RX ADMIN — DEXTRAN 70, GLYCERIN, HYPROMELLOSE 1 DROP: 1; 2; 3 SOLUTION/ DROPS OPHTHALMIC at 13:32

## 2022-02-06 RX ADMIN — DEXTRAN 70, GLYCERIN, HYPROMELLOSE 1 DROP: 1; 2; 3 SOLUTION/ DROPS OPHTHALMIC at 09:40

## 2022-02-06 RX ADMIN — APIXABAN 10 MG: 5 TABLET, FILM COATED ORAL at 09:56

## 2022-02-06 RX ADMIN — DEXTRAN 70, GLYCERIN, HYPROMELLOSE 1 DROP: 1; 2; 3 SOLUTION/ DROPS OPHTHALMIC at 20:02

## 2022-02-06 RX ADMIN — APIXABAN 5 MG: 5 TABLET, FILM COATED ORAL at 20:02

## 2022-02-06 ASSESSMENT — ACTIVITIES OF DAILY LIVING (ADL)
ADLS_ACUITY_SCORE: 8
ADLS_ACUITY_SCORE: 4
ADLS_ACUITY_SCORE: 4
ADLS_ACUITY_SCORE: 8
ADLS_ACUITY_SCORE: 4
ADLS_ACUITY_SCORE: 8
ADLS_ACUITY_SCORE: 4
ADLS_ACUITY_SCORE: 8
ADLS_ACUITY_SCORE: 4
ADLS_ACUITY_SCORE: 8
ADLS_ACUITY_SCORE: 4
ADLS_ACUITY_SCORE: 4
ADLS_ACUITY_SCORE: 8
ADLS_ACUITY_SCORE: 8
ADLS_ACUITY_SCORE: 4

## 2022-02-06 NOTE — PROGRESS NOTES
Patient this shift reporting she needs a little extra oxygen in the mornings, and when inquired further determined patient thinks oxygen needs greater when having bowel movement in mornings. Patient does not feel like she is straining or having discomfort from bowel movements. Noted this morning that patient had bowel movement and void of urine, and oxygen saturations were captured reading down to 75% for 1 minute before starting to rebound, taking about 6-7 minutes to get oxygen saturations to 90% and above. When resting, patient oxygen at 2 LPM saturations 94-97%. Turned down to 1 LPM, and noted with exertion to desaturate again, so returned to 2 LPM.

## 2022-02-06 NOTE — PLAN OF CARE
Received Patient from outgoing Nurse with no distress,  alert and oriented, vitals are stable, blood sugar is stable, Patient ate 100% of her breakfast, patient stated that she is feeling so much better, her oxygen level dropped one time while eating to 88 and quickly return to 93 after eating, will continue to monitor during the day.

## 2022-02-06 NOTE — PROGRESS NOTES
Patient has been assessed for Home Oxygen needs.     Pulse oximetry (SpO2) and Oxygen flow readings:    SpO2 = 89% on room air at rest while awake.    SpO2 improved to 93% on 2 liters/minute at rest.    SpO2 = 88% on room air during activity/with exercise.    *SpO2 improved to 92% on 2 liters/minute during activity/with exercise.

## 2022-02-06 NOTE — PROGRESS NOTES
[facility]    Medicine Progress Note - Hospitalist Service    Date of Admission:  1/26/2022    Assessment & Plan        Patient is a 66-year-old female without known significant past medical history admitted with COVID-19 pneumonia, bilateral pulmonary emboli, acute hypoxic respiratory failure and systemic inflammatory response syndrome.  Patient was initially critically ill and required ICU management with BiPAP but has been progressively improving with more rapid improvement noted in the past 2 days patient has weaned from 3-5 L NC yesterday to RA at rest and 2L with activity today.  During this hospitalization patient has been discovered to have a newly diagnosed cardiomyopathy with EF of 40 to 45% and also has been diagnosed with type 2 diabetes with a hemoglobin A1c of 6.5.      Principal Problem:    Pneumonia due to 2019 novel coronavirus    Assessment: has not been vaccinated, time of symptom onset unclear, positive PCR test on 1/26,  received Actemra on 1/27 and did require BiPAP, overall improving    Plan:   -10-day course of dexamethasone is complete  -Will need at least 20 days isolation per current system guidelines - COVID recovered on 2/14/22  -Continue Eliquis for VTE prophylaxis   -Given ongoing rapid improvement, will continue hospitalization and see if we can wean patient off all O2 supplementation prior to discharge however anticipate discharge home tomorrow either with or without oxygen.    Active Problems:    Multiple subsegmental pulmonary emboli without acute cor pulmonale (H)    Assessment: Bilateral PE present at admission, hemodynamically stable, previously treated with heparin infusion but was transitioned to Eliquis on 1/30/22.    Plan: Continue with loading doses of Eliquis at this time followed by ongoing lower dose Eliquis course for PE, anticipate at least 3 months treatment course      Acute respiratory failure with hypoxia (H)    Assessment: Secondary  to COVID-19 pneumonia and pulmonary emboli as outlined above, trending towards improvement slowly.  Patient did required BiPAP in ICU initially, then stabilized on high flow nasal cannula for several days and has now transitioned to regular nasal cannula oxygen supplementation with rapid reduction in oxygen needs over the past 3 days.     Plan: Continue to monitor closely and titrate down on O2 supplementation as able. Nursing to perform repeat home oxygen assessment this evening given ongoing improvement throughout the day.      New onset type 2 diabetes mellitus (H)-A1c 6.5      Assessment: Patient not currently on any medication scheduled with blood sugars fairly well controlled with moderate carbohydrate diet alone even with dexamethasone administration.  Patient would really like to avoid medication for this and is very committed to diet and lifestyle modification going forward.  Diabetic education via nutritionist did occur during the stay    Plan: Continue NovoLog sliding scale, especially while patient is on dexamethasone.  Continue moderate carb diet now and at time of discharge        Cardiomyopathy (H)-LV EF 40-45%    Assessment: Echocardiogram performed at start of this hospitalization shows decreased left ventricular ejection fraction of 40 to 45% thought to be acute but without signs of volume overload.  Cardiomyopathy is suspected probably secondary to COVID-19 viral infection and/or stress cardiomyopathy with cardiac work-up showing no concerns for ischemic disease, myocarditis.  No signs heart failure so far.  Unable to tolerate Coreg during this stay due to low blood pressure development.    Plan:  Coreg discontinued due to low blood pressure but could reassess if blood pressure trends upward, utilize diuretics prn for signs of acute heart failure.  Anticipate outpatient follow-up with repeat echocardiogram following discharge and COVID recovery      Left thigh numbness    Assessment: Developing  upon awakening on 1/31/22 with exam not following any dermatome but following superficial nerve pattern of the thigh.  Patient did sleep in an atypical position on her left side with a pillow between her legs prior to symptom onset, which could have allowed for pressure on the superficial nerve with subsequent numbness.  Has now fully resolved    Plan: Continue to monitor for ongoing resolution      Hyponatremia-admit Na 131    Assessment: Present at time of admission, thought secondary to chronic diuretic therapy and poor oral intake, now resolved    Plan: Continue to monitor for ongoing resolution       Hypopotassemia-admit K 3.0    Assessment: Resolved    Plan: Continue to monitor and supplement as indicated      Abnormal transaminases    Assessment: Mildly elevated, thought secondary to COVID-19 infection however continue to be slightly elevated and has increased slightly today compared to previous without clear cause    Plan: Continue intermittent monitoring to ensure ongoing improvement as Covid infection resolves. If persistent elevation is present on an outpatient basis, further evaluation may be indicated.      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    Assessment: Noted incidentally on imaging performed at the start of this hospitalization and of unclear clinical significance    Plan: No acute intervention is needed but patient will need outpatient follow-up following recovery from acute illness for further evaluation as appropriate      SIRS (systemic inflammatory response syndrome) (H)-tachycardia, tachypnea, leukocytosis    Assessment: Secondary to COVID-19 pneumonia and pulmonary emboli, resolved    Plan: Continue to monitor for ongoing resolution      Lymphopenia due to COVID-19 virus    Assessment: Noted initially, resolving    Plan: No further routine monitoring is needed      LBBB (left bundle branch block)-chronic    Assessment: Still present on EKG    Plan: No acute intervention is  needed      Hypertension    Assessment: Chronic, with patient normally on hydrochlorothiazide which has been held during this stay to date due to electrolyte abnormalities.  Blood pressures have been in the low normal range and attempted to initiate Coreg due to cardiomyopathy as above but blood pressures could not tolerate    Plan: Continue to hold all antihypertensive agents at this time and monitor for stability      Obesity    Assessment: Chronic and stable    Plan: No acute intervention needed, patient would benefit from weight loss on an outpatient basis.         Diet: Moderate Consistent Carb (60 g CHO per Meal) Diet  Room Service  Snacks/Supplements Adult: Radha Malloy Standard Oral Supplement; With Meals  Room Service    DVT Prophylaxis: Eliquis  Gomez Catheter: Not present  Central Lines: None  Cardiac Monitoring: None  Code Status: Full Code      Disposition Plan   Expected Discharge: 02/07/2022     Anticipated discharge location:  Awaiting care coordination huddle  Delays:     Covid Test Positive  Oxygen Needs            The patient's care was discussed with the Bedside Nurse, Care Coordinator/ and Patient.    Lo Almonte MD  Hospitalist Service  [facility]  Securely message with the Vocera Web Console (learn more here)  Text page via [employer] Paging/Directory         Clinically Significant Risk Factors Present on Admission                    ______________________________________________________________________    Interval History   Patient continues to be vitally stable. She has been weaned to room air at rest and has been using 2 L of nasal cannula oxygen with activity with ongoing improvement in activity tolerance. Patient reports feeling much better overall and is very excited anticipated discharge tomorrow. She has no new symptoms or concerns. No new nursing concerns.    Data reviewed today: I reviewed all medications, new labs and imaging  results over the last 24 hours.    Physical Exam   Vital Signs: Temp: 97.9  F (36.6  C) Temp src: Oral BP: 137/67 Pulse: 93   Resp: 17 SpO2: 91 % O2 Device: Nasal cannula with humidification Oxygen Delivery: 2 LPM  Weight: 155 lbs 3.2 oz  Constitutional: awake, alert, cooperative, no apparent distress, and appears stated age  Respiratory: No increased work of breathing, decreased but improving air exchange without wheezes or crackles  Cardiovascular: Regular rate and rhythm without murmur  GI: Bowel sounds present, abdomen obese but soft and nondistended  Skin: no redness, warmth, or swelling and no rashes  Musculoskeletal: no lower extremity pitting edema present  Neurologic: Awake, alert, oriented to name, place and situation    Data   Recent Labs   Lab  02/06/22  1210 02/06/22  0804 02/06/22  0620 02/04/22  0725 02/04/22  0609 02/03/22  0738 02/03/22  0544 02/02/22  0807 02/02/22  0544 02/01/22  0732 02/01/22  0712   WBC   --   --   --   --  7.1  --   --   --   --   --  6.3   HGB   --   --   --   --  12.4  --   --   --   --   --  12.1   MCV   --   --   --   --  93  --   --   --   --   --  90   PLT   --   --   --   --  251  --   --   --   --   --  254   NA   --   --  142  --   --   --  143  --  142  --  143   POTASSIUM   --   --  4.1  --  3.9  --  3.6  --  3.9  --  4.2   CHLORIDE   --   --  110*  --   --   --  112*  --  112*  --  113*   CO2   --   --  29  --   --   --  28  --  26  --  24   BUN   --   --  20  --   --   --  21  --  18  --  15   CR   --   --  0.56  --   --   --  0.64  --  0.63  --  0.58   ANIONGAP   --   --  3  --   --   --  3  --  4  --  6   FATUMA   --   --  9.2  --   --   --  8.7  --  8.6  --  8.6   GLC  113* 96 92   < >  --    < > 98   < > 122*   < > 97   ALBUMIN   --   --  2.9*  --   --   --   --   --   --   --  2.2*   PROTTOTAL   --   --  6.5*  --   --   --   --   --   --   --  5.6*   BILITOTAL   --   --  0.5  --   --   --   --   --   --   --  0.5   ALKPHOS   --   --  72  --   --   --   --   --    --   --  79   ALT   --   --  193*  --   --   --   --   --   --   --  62*   AST   --   --  54*  --   --   --   --   --   --   --  39    < > = values in this interval not displayed.

## 2022-02-06 NOTE — PROGRESS NOTES
Patient' daughter talked to patient and they are requesting:   whole sale Even flow  oxygen  Condenser    Fax [phone] order to [employer] but they are not open on Sundays  Daughter also states she called a company in [place] about purchasing a Even flow concentrator but the patient's daughter did not know the name of the company    They are now thinking of getting equipment locally.      Talked to patient daughter about [facility] medical contacting them with what is available for patient from home oxygen and provider will place order    Daughter and patient in agreement to wait for home oxygen orders then consult with [facility] about home oxygen options and equipment  Nursing to contact [facility] when orders are ready

## 2022-02-07 ENCOUNTER — PATIENT OUTREACH (OUTPATIENT)
Dept: CARE COORDINATION | Facility: CLINIC | Age: 67
End: 2022-02-07
Payer: COMMERCIAL

## 2022-02-07 ENCOUNTER — DOCUMENTATION ONLY (OUTPATIENT)
Dept: MEDSURG UNIT | Facility: CLINIC | Age: 67
End: 2022-02-07
Payer: COMMERCIAL

## 2022-02-07 VITALS
TEMPERATURE: 96.8 F | DIASTOLIC BLOOD PRESSURE: 69 MMHG | HEART RATE: 78 BPM | RESPIRATION RATE: 18 BRPM | SYSTOLIC BLOOD PRESSURE: 125 MMHG | HEIGHT: 62 IN | WEIGHT: 155.2 LBS | OXYGEN SATURATION: 93 % | BODY MASS INDEX: 28.56 KG/M2

## 2022-02-07 DIAGNOSIS — J12.82 PNEUMONIA DUE TO 2019 NOVEL CORONAVIRUS: Primary | ICD-10-CM

## 2022-02-07 DIAGNOSIS — U07.1 PNEUMONIA DUE TO 2019 NOVEL CORONAVIRUS: Primary | ICD-10-CM

## 2022-02-07 LAB
ALBUMIN SERPL-MCNC: 2.8 G/DL (ref 3.4–5)
ALP SERPL-CCNC: 70 U/L (ref 40–150)
ALT SERPL W P-5'-P-CCNC: 209 U/L (ref 0–50)
ANION GAP SERPL CALCULATED.3IONS-SCNC: 4 MMOL/L (ref 3–14)
AST SERPL W P-5'-P-CCNC: 61 U/L (ref 0–45)
BILIRUB SERPL-MCNC: 0.6 MG/DL (ref 0.2–1.3)
BUN SERPL-MCNC: 24 MG/DL (ref 7–30)
CALCIUM SERPL-MCNC: 9 MG/DL (ref 8.5–10.1)
CHLORIDE BLD-SCNC: 112 MMOL/L (ref 94–109)
CO2 SERPL-SCNC: 28 MMOL/L (ref 20–32)
CREAT SERPL-MCNC: 0.63 MG/DL (ref 0.52–1.04)
ERYTHROCYTE [DISTWIDTH] IN BLOOD BY AUTOMATED COUNT: 14.9 % (ref 10–15)
GFR SERPL CREATININE-BSD FRML MDRD: >90 ML/MIN/1.73M2
GLUCOSE BLD-MCNC: 98 MG/DL (ref 70–99)
GLUCOSE BLDC GLUCOMTR-MCNC: 122 MG/DL (ref 70–99)
GLUCOSE BLDC GLUCOMTR-MCNC: 96 MG/DL (ref 70–99)
HCT VFR BLD AUTO: 40.9 % (ref 35–47)
HGB BLD-MCNC: 13.2 G/DL (ref 11.7–15.7)
MCH RBC QN AUTO: 30.1 PG (ref 26.5–33)
MCHC RBC AUTO-ENTMCNC: 32.3 G/DL (ref 31.5–36.5)
MCV RBC AUTO: 93 FL (ref 78–100)
PLATELET # BLD AUTO: 222 10E3/UL (ref 150–450)
POTASSIUM BLD-SCNC: 3.9 MMOL/L (ref 3.4–5.3)
PROT SERPL-MCNC: 6.3 G/DL (ref 6.8–8.8)
RBC # BLD AUTO: 4.38 10E6/UL (ref 3.8–5.2)
SODIUM SERPL-SCNC: 144 MMOL/L (ref 133–144)
TROPONIN I SERPL HS-MCNC: 3 NG/L
TROPONIN I SERPL HS-MCNC: 4 NG/L
TROPONIN I SERPL HS-MCNC: 4 NG/L
WBC # BLD AUTO: 6.1 10E3/UL (ref 4–11)

## 2022-02-07 PROCEDURE — 99239 HOSP IP/OBS DSCHRG MGMT >30: CPT | Performed by: FAMILY MEDICINE

## 2022-02-07 PROCEDURE — 84484 ASSAY OF TROPONIN QUANT: CPT | Performed by: FAMILY MEDICINE

## 2022-02-07 PROCEDURE — 999N000147 HC STATISTIC PT IP EVAL DEFER: Performed by: PHYSICAL THERAPIST

## 2022-02-07 PROCEDURE — 250N000013 HC RX MED GY IP 250 OP 250 PS 637: Performed by: FAMILY MEDICINE

## 2022-02-07 PROCEDURE — 85027 COMPLETE CBC AUTOMATED: CPT | Performed by: FAMILY MEDICINE

## 2022-02-07 PROCEDURE — 36415 COLL VENOUS BLD VENIPUNCTURE: CPT | Performed by: FAMILY MEDICINE

## 2022-02-07 PROCEDURE — 80053 COMPREHEN METABOLIC PANEL: CPT | Performed by: FAMILY MEDICINE

## 2022-02-07 RX ORDER — GLUCOSAMINE HCL/CHONDROITIN SU 500-400 MG
CAPSULE ORAL
Qty: 100 EACH | Refills: 3 | Status: SHIPPED | OUTPATIENT
Start: 2022-02-07

## 2022-02-07 RX ORDER — LANCETS
EACH MISCELLANEOUS
Qty: 100 EACH | Refills: 0 | Status: SHIPPED | OUTPATIENT
Start: 2022-02-07

## 2022-02-07 RX ADMIN — APIXABAN 5 MG: 5 TABLET, FILM COATED ORAL at 09:52

## 2022-02-07 RX ADMIN — DEXTRAN 70, GLYCERIN, HYPROMELLOSE 1 DROP: 1; 2; 3 SOLUTION/ DROPS OPHTHALMIC at 09:53

## 2022-02-07 RX ADMIN — MINERAL OIL, PETROLATUM, PHENYLEPHRINE HCL: 2.5; 140; 749 OINTMENT TOPICAL at 10:04

## 2022-02-07 ASSESSMENT — ACTIVITIES OF DAILY LIVING (ADL)
ADLS_ACUITY_SCORE: 4
ADLS_ACUITY_SCORE: 6
ADLS_ACUITY_SCORE: 4
ADLS_ACUITY_SCORE: 6
ADLS_ACUITY_SCORE: 4
ADLS_ACUITY_SCORE: 4
ADLS_ACUITY_SCORE: 6
ADLS_ACUITY_SCORE: 4
ADLS_ACUITY_SCORE: 6
ADLS_ACUITY_SCORE: 6
ADLS_ACUITY_SCORE: 4
ADLS_ACUITY_SCORE: 6

## 2022-02-07 NOTE — PROVIDER NOTIFICATION
02/07/22 0944   Home Oxygen Assessment (RN/RT ONLY)   Does patient have oxygen at home? No   1. SpO2 on room air at rest while awake 92   2. SpO2 while at rest on oxygen NA       Oxygen LPM at rest NA   3. SpO2 on room air during Activity/with exercise 87   4. SpO2 with oxygen during activity/with exercise 92       Oxygen LPM during activity/with exercise 3   Does patient qualify for Home O2? Yes   Stevie Joyce RN

## 2022-02-07 NOTE — PROGRESS NOTES
Patient this shift was on room air during evening, expressed anxiety over oxygen needs overnight. Writer encouraged patient to place nasal cannula on when sleeping, and writer agreed to monitor O2 sats and may start on oxygen if warranted.Patient noted to be on 1 LPM when vitals checked. Removed oxygen  this morning around 6 a.m. and patient noted to desat to 88% when taken off oxygen.     VSS, reported some mild numbness in left upper leg that patient thinks might be a pulled/stretched muscle. Otherwise denied pain when asked, called appropriately, independent in room. LS fine crackles in bases and clear/slightly diminished.

## 2022-02-07 NOTE — PROGRESS NOTES
Oxygen Documentation:   I certify that this patient, Stacie Hair has been under my care (or a nurse practitioner or physican's assistant working with me). This is the face-to-face encounter for oxygen medical necessity.      Stacie Hair is now in a chronic stable state and continues to require supplemental oxygen. Patient has continued oxygen desaturation due to COVID 19 pneumonia.    Alternative treatment(s) tried or considered and deemed clinically infective for treatment of COVID 19 pneumonia include inhalers, steroids and pulmonary rehab.  If portability is ordered, is the patient mobile within the home? yes

## 2022-02-07 NOTE — PROGRESS NOTES
Clinic Care Coordination Contact  Ambulatory Care Coordination to Inpatient Care Management   Hand-In Communication    Date:  February 7, 2022  Name: Stacie Hair is enrolled in Ambulatory Care Coordination program and I am the Lead Care Coordinator.  CC Contact Information: Epic InMonoLibresket + phone  Payor Source: Payor: BCBS / Plan: BCBS OUT OF STATE / Product Type: Indemnity /   Current services in place:     Please see the CC Snaphot and Care Management Flowsheets for specific  details of this Stacie Hair care plan.   Additional details/specific concerns r/t this admission:    New to clinic care coordination.  No additional information to share     I will follow this admission in Epic. Please feel free to contact me with questions or for further collaboration in discharge planning.  [facility]   Nloa Nugent RN, Care Coordinator   [facility]'s   E-mail [email or web address]   [phone]

## 2022-02-07 NOTE — PROGRESS NOTES
"Care Transitions Note: Get Well Loop     CM referral received--Patient referred for Home Virtual COVID-19 Monitoring Program     CM placed a call to the pt's room due to Covid-19+ status diagnosis.     Extended invitation for pt to participate in GetWell Loop for COVID virtual monitoring; pt agreed.     Pt verifies she has an active email address and mobile phone number that will accept text messages.     Cell #: [phone]  Email: [email or web address]     Informed pt that a nurse or care coordinator will call within 24 hours post discharge to check on symptoms and to inquire on pt s status.  Pt aware to look for an email (or text message) from GetWell Loop. Pt informed that additional GetWell documents will be provided at time of discharge by beside RN.      Pt reports no further concerns/questions at this time.      Plan: Discharge home and GetWell Loop Order-+Covid with new home O2 needs.     CM to complete Clinic Care Coordination Handoff Referral on discharge.    *High Priority*  --- Covid-19 IP Home Monitoring.\"      " [FreeTextEntry1] : \par Lab results reviewed and discussed with patient\par \par cholesterol is good, LFT's are normal --pt wiith hx of HLD\par she will cont Zocor 20mg daily\par reinforced  low cholesterol/low fat diet\par \par  TSH/T4 -normal -- pt with hx of Hypothyroid\par cont synthroid 125mcg\par \par HgA1c is 5.8--High risk for diabetes.\par discussed importance of following a low sugar/low carb diet\par we'll recheck fasting glucose and HgA1c in 3 months\par \par HTN\par cont metoprolol 25mg\par \par anxiety\par cont Lexapro 10mg daily\par \par f/u in 3 months\par

## 2022-02-07 NOTE — PLAN OF CARE
S-(situation): Patient discharged to home via wheelchair with daughter    B-(background): +Covid    A-(assessment): A&Ox4. VSS. Requiring 3 liters with activity. Stayed on 2liters at rest with O2 sats 92% after activity this am.  Denies any pain.  Dyspneic with activity. Able to go in and out of the bathroom independently.    R-(recommendations): Discharge instructions reviewed with patient. Listed belongings gathered and returned to patient.          Discharge Nursing Criteria:     Care Plan and Patient education resolved: Yes    New Medications- pt has been educated about purpose and side effects: Yes    Vaccines  Influenza status verified at discharge:  Yes      MISC  Prescriptions if needed, hard copies sent with patient  NA  Home medications returned to patient: NA  Medication Bin checked and emptied on discharge Yes  Patient reports post-discharge pain management plan is effective: Yes  Stevie Joyce RN

## 2022-02-07 NOTE — PLAN OF CARE
S-(situation): DME order for walker    B-(background): Patient is a 66 year old female admitted 1/26/22 due to COVID 19 pneumonia, bilateral PE, acute hypoxic respiratory failure with SIRS. Patient with a previous medical history of vertigo, hyperlipidemia, migraines, obesity, left bundle branch block, cardiomyopathy, and HTN.     A-(assessment): Per nursing patient unsteady on her feet, patient notes this is chronic due to her chronic vertigo, she admits to surface seeking and is fearful of falling. Nursing reports daughter requests patient have a walker at discharge. This is a reasonable request given history, hospitalization for extended period due to acute illness.    R-(recommendations): PT provided front wheeled walker per MD order, patient completed purchase paperwork and provided patient copy. Demonstrates proficient MOD IND transfers and ambulation with device. Device fit to patient and instructed in proper adjusting and management. No additional inpatient PT needs identified. Per OT patient would benefit from outpatient OT and pulmonary rehab services at discharge, patient verbalized agreement with this plan.    Thank you for your referral.  Zeinab Rogers, PT, DPT, ATC, [facility]  O: [phone]  E: [email or web address]

## 2022-02-07 NOTE — PROGRESS NOTES
Received intake call for home oxygen at 12:13PM. Reviewed patient's chart; Patient qualifies under insurance guidelines and all documentation is in the chart including a good order.   12:28PM- Called to offer choice and patient is okay with [facility] setting them up. Discussed equipment with patient and informed them that we would be to bedside with oxygen in the next 2 hours.   12:31PM- Spoke with care coordinator, JINA, confirmed we received the order, and provided them with ETA of oxygen.

## 2022-02-07 NOTE — DISCHARGE SUMMARY
[facility]  Hospitalist Discharge Summary      Date of Admission:  1/26/2022  Date of Discharge:  2/7/2022  Discharging Provider: Lo Almonte MD  Discharge Service: Hospitalist Service    Discharge Diagnoses   Principal Problem:    Pneumonia due to 2019 novel coronavirus  Active Problems:    Hypertension    Acute respiratory failure with hypoxia (H)    Multiple subsegmental pulmonary emboli without acute cor pulmonale (H)    Hyponatremia-admit Na 131    Hypopotassemia-admit K 3.0    Abnormal transaminases    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    SIRS (systemic inflammatory response syndrome) (H)-tachycardia, tachypnea, leukocytosis    Lymphopenia due to COVID-19 virus    LBBB (left bundle branch block)-chronic    Obesity    New onset type 2 diabetes mellitus (H)-A1c 6.5    Cardiomyopathy (H)-LV EF 40-45%    Hypernatremia      Follow-ups Needed After Discharge   Follow-up Appointments     Follow-up and recommended labs and tests       Follow up with primary care provider, Gregory G. Schoen, or one of his   partners within 7-14 days for hospital follow up.             Discharge Disposition   Discharged to home with home oxygen   Condition at discharge: Stable    Hospital Course          Patient is a 66-year-old female without known significant past medical history admitted with COVID-19 pneumonia, bilateral pulmonary emboli, acute hypoxic respiratory failure and systemic inflammatory response syndrome.  Patient was initially critically ill and required ICU management with BiPAP but has been progressively improving and at time of discharge is on room air at rest but still requiring 2 to 3 L of oxygen with activity.  She is discharged home with ongoing supplemental oxygen as indicated and will follow closely on the outpatient basis.  A get well loop has been placed prior to discharge.  During this hospitalization patient has been discovered to have a newly  diagnosed cardiomyopathy with EF of 40 to 45% and also has been diagnosed with type 2 diabetes with a hemoglobin A1c of 6.5.      Principal Problem:    Pneumonia due to 2019 novel coronavirus    Assessment: has not been vaccinated, time of symptom onset unclear, positive PCR test on 1/26,  received Actemra on 1/27 and did require BiPAP, overall improving    Plan:   -10-day course of dexamethasone is complete  -Will need at least 20 days isolation per current system guidelines - COVID recovered on 2/14/22  -Discharged with Xarelto for PE treatment and COVID VTE prophylaxis  -Follow-up with primary care provider in 7 to 14 days    Active Problems:    Multiple subsegmental pulmonary emboli without acute cor pulmonale (H)    Assessment: Bilateral PE present at admission, hemodynamically stable, previously treated with heparin infusion but was transitioned to Eliquis on 1/30/22 and due to insurance issues will need to transition to Xarelto at time of discharge.    Plan: Discharge with Xarelto.  Patient is aware that she will need at least 3 months of treatment going forward      Acute respiratory failure with hypoxia (H)    Assessment: Secondary to COVID-19 pneumonia and pulmonary emboli as outlined above, trending towards improvement     Plan: Discharge with ongoing oxygen as outlined above      New onset type 2 diabetes mellitus (H)-A1c 6.5      Assessment: Patient not currently on any medication scheduled with blood sugars fairly well controlled with moderate carbohydrate diet alone even with dexamethasone administration.  Patient would really like to avoid medication for this and is very committed to diet and lifestyle modification going forward.  Diabetic education via nutritionist did occur during the stay    Plan: Home glucometer prescribed.  Patient will continue moderate carbohydrate diet and monitor fasting blood sugars, follow-up with PCP      Cardiomyopathy (H)-LV EF 40-45%    Assessment: Echocardiogram  performed at start of this hospitalization shows decreased left ventricular ejection fraction of 40 to 45% thought to be acute but without signs of volume overload.  Cardiomyopathy is suspected probably secondary to COVID-19 viral infection and/or stress cardiomyopathy with cardiac work-up showing no concerns for ischemic disease, myocarditis.  No signs heart failure during the stay.  Patient has been unable to tolerate Coreg due to low blood pressure development.    Plan:   Anticipate outpatient follow-up with repeat echocardiogram following discharge and COVID recovery.  If persistent cardiomyopathy is present and patient's blood pressures improved, would recommend reattempting beta-blocker and ACE inhibitor initiation.      Left thigh numbness    Assessment: Developing upon awakening on 1/31/22 with exam not following any dermatome but following superficial nerve pattern of the thigh.  Patient did sleep in an atypical position on her left side with a pillow between her legs prior to symptom onset, which could have allowed for pressure on the superficial nerve with subsequent numbness.  Has almost fully resolved at time of discharge    Plan: Continue to monitor for ongoing resolution in an outpatient basis      Hyponatremia-admit Na 131    Assessment: Present at time of admission, thought secondary to chronic diuretic therapy and poor oral intake, now resolved    Plan: No further routine monitoring needed      Hypopotassemia-admit K 3.0    Assessment: Resolved    Plan: No further routine monitoring needed      Abnormal transaminases    Assessment: Mildly elevated, thought secondary to COVID-19 infection however continue to be slightly elevated and has increased slightly today compared to previous without clear cause    Plan: Would recommend outpatient monitoring to ensure complete resolution      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    Assessment: Noted incidentally on imaging performed at the start  of this hospitalization and of unclear clinical significance    Plan: No acute intervention is needed but patient will need outpatient follow-up following recovery from acute illness for further evaluation as appropriate      SIRS (systemic inflammatory response syndrome) (H)-tachycardia, tachypnea, leukocytosis    Assessment: Secondary to COVID-19 pneumonia and pulmonary emboli, resolved    Plan: No further treatment needed      Lymphopenia due to COVID-19 virus    Assessment: Noted initially, resolving    Plan: No further routine monitoring is needed      LBBB (left bundle branch block)-chronic    Assessment: Still present on EKG    Plan: No acute intervention is needed      Hypertension    Assessment: Chronic, with patient normally on hydrochlorothiazide which has been held during this stay to date due to electrolyte abnormalities.  Blood pressures have been in the low normal range and attempted to initiate Coreg due to cardiomyopathy as above but blood pressures could not tolerate    Plan: Continue to hold all antihypertensive agents at time of discharge      Obesity    Assessment: Chronic and stable    Plan: No acute intervention needed, patient would benefit from weight loss on an outpatient basis.        Consultations This Hospital Stay   PHARMACY IP CONSULT  PHARMACY IP CONSULT  NUTRITION SERVICES ADULT IP CONSULT  PHYSICAL THERAPY ADULT IP CONSULT  OCCUPATIONAL THERAPY ADULT IP CONSULT  NUTRITION SERVICES ADULT IP CONSULT    Code Status   Full Code    Time Spent on this Encounter   I, Lo Almonte MD, personally saw the patient today and spent greater than 30 minutes discharging this patient.       Lo Almonte MD  [address] MEDICAL SURGICAL  [address] [place] [address]  Phone: [phone]  ______________________________________________________________________    Physical Exam   Vital Signs: Temp: 96.8  F (36  C) Temp src: Oral BP: 125/69  Pulse: 78   Resp: 18 SpO2: 93 % O2 Device: Nasal cannula Oxygen Delivery: 2 LPM  Weight: 155 lbs 3.2 oz  Constitutional: awake, alert, cooperative, no apparent distress, and appears stated age  Respiratory: No increased work of breathing, decreased breath sounds but improved compared to previous but no crackles or wheezing auscultated  Cardiovascular: Regular rate and rhythm without murmur  GI: Bowel sounds present, abdomen is obese but soft, nondistended  Musculoskeletal: no lower extremity pitting edema present  Neurologic: Awake, alert, oriented to name, place and situation       Primary Care Physician   Gregory G. Schoen    Discharge Orders      Care Coordination Referral      Care Coordination Referral      Physical Therapy Referral      Pulmonary Rehab Referral      Reason for your hospital stay    1.  COVID 19 pneumonia which is improving.  You will be discharging home with oxygen at night and with activity.  2.  Pulmonary emboli (clots in your lungs) - you are on a blood thinner Eliquis to help thin your blood and will need to continue this for at least 3 months.  3.  New systolic heart failure - this could be caused by the COVID and stress on you heart or may be something that was there before COVID. It is mild but needs to be watched and you should start weighing yourself every day and talking with your provider if you gain more than 5 lbs in one week.  It is also recommended that you have a repeat ECHO in a few months once you are recovered from COVID and see if this is still present  4.  New diabetes - your blood sugars are doing very well on a moderate carbohydrate diabetic diet.  Please continue this as you go home and more sure to follow with your primary care provider to see if this improves.     Follow-up and recommended labs and tests     Follow up with primary care provider, Gregory G. Schoen, or one of his partners within 7-14 days for hospital follow up.     Activity    Your activity upon  discharge: activity as tolerated     Oxygen Adult/Peds     Walker Order for DME - ONLY FOR DME    DME Documentation:   Describe the reason for need to support medical necessity: gait imbalace at baseline, chronic vertigo.     I, the undersigned, certify that the above prescribed supplies are medically necessary for this patient and is both reasonable and necessary in reference to accepted standards of medical and necessary in reference to accepted standards of medical practice in the treatment of this patient's condition and is not prescribed as a convenience.     Diet    Follow this diet upon discharge:     Moderate Consistent Carb (60 g CHO per Meal)  diabetic Diet       Significant Results and Procedures   Results for orders placed or performed during the hospital encounter of 01/26/22   XR Chest Port 1 View    Narrative    CHEST PORTABLE ONE VIEW January 26, 2022 11:20 AM     HISTORY: Shortness of breath.    COMPARISON: Chest x-rays and CT dated 10/16/2021.    FINDINGS: Extensive bilateral pulmonary infiltrates (worse on the  right) are noted and are new since the prior studies from 2021.  Cardiac silhouette remains mildly enlarged. No pneumothorax or  significant pleural fluid collection.      Impression    IMPRESSION:  1. Bilateral pulmonary infiltrates could represent pulmonary edema or  infectious process such as COVID-19 pneumonia. Recommend clinical  correlation.  2. Mild cardiomegaly again noted.    I discussed the severe bilateral pulmonary infiltrates with Dr. Leal  on 1/26/2022 at 11:25 AM.    XAVIER MARVIN MD         SYSTEM ID:  [id number]   CT Chest Pulmonary Embolism w Contrast     Value    Radiologist flags Pulmonary embolism (AA)    Narrative    CT CHEST PULMONARY EMBOLISM W CONTRAST 1/26/2022 12:34 PM    CLINICAL HISTORY: PE suspected, low/intermediate prob, positive  D-dimer. Shortness of breath. COVID19 and pneumonia.  TECHNIQUE: CT angiogram chest during arterial phase injection IV  contrast.  2D and 3D MIP reconstructions were performed by the CT  technologist. Dose reduction techniques were used.     CONTRAST: 70mL, Isovue 370    COMPARISON: 10/16/2021    FINDINGS:  ANGIOGRAM CHEST: Small filling defects in right lower lobe, right  middle lobe and left upper lobe segmental and subsegmental pulmonary  arteries, consistent with pulmonary emboli. Thoracic aorta is negative  for dissection. No CT evidence of right heart strain.    LUNGS AND PLEURA: Severe bilateral groundglass and patchy infiltrates  consistent with pneumonia. No pleural effusion.    MEDIASTINUM/AXILLAE: New mild hilar and mediastinal lymphadenopathy.  For example, there is a 2.1 cm precarinal lymph node (series 4, image  45) and a 2.0 cm right infrahilar lymph node (series 4, image 55). No  thoracic aortic aneurysm. No coronary artery calcifications. No  pericardial effusion.    UPPER ABDOMEN: Hepatic steatosis. Mild hypodense thickening of the  adrenal glands bilaterally without a focal mass, new or more prominent  since 10/16/2021.    MUSCULOSKELETAL: No concerning bone lesions.      Impression    IMPRESSION:  1.  Positive for bilateral segmental and subsegmental pulmonary  emboli.  2.  Severe bilateral infiltrates consistent with COVID 19 pneumonia.  3.  New mild mediastinal and hilar lymphadenopathy is nonspecific,  likely reactive.  4.  Stable hepatic steatosis.  5.  New mild hypodense thickening of the adrenal glands bilaterally  without a focal mass, of uncertain clinical significance. A follow-up  CT in 6 months can be considered to assess for changes, if clinically  indicated.    [Critical Result: Pulmonary embolism]    Finding was identified on 1/26/2022 1:04 PM.     Dr. Leal was contacted by me on 1/26/2022 1:17 PM and verbalized  understanding of the critical result.      WAN OSWALD MD         SYSTEM ID:  [id number]   Echocardiogram Complete     Value    LVEF  40-45%    Narrative     [id number]  [id number]  [id number]  [id number]^[email or web address]^IBETH     [facility]  Echocardiography Laboratory  [address] [place] [address]     Name: ANDREE VASQUEZ  MRN: [id number]  : 1955  Study Date: 2022 10:09 AM  Age: 66 yrs  Gender: Female  Patient Location: [facility]  Reason For Study: Pulmonary Emboli, Covid +  History: HTN,Obesity,LBBB  Ordering Physician: CRISTAL DODD  Referring Physician: Schoen, Gregory, MD  Performed By: Kandace Giron     BSA: 1.8 m2  Height: 62 in  Weight: 175 lb  HR: 56  BP: 125/81 mmHg  ______________________________________________________________________________  Procedure  Complete Portable Echo Adult. Optison (NDC #[id number]) given intravenously.  ______________________________________________________________________________  Interpretation Summary     The left ventricle is normal in size.  There is mild-moderate global hypokinesia of the left ventricle.  The visual ejection fraction is 40-45%.  The right ventricle is normal in structure, function and size.  There is mild (1+) mitral regurgitation.  There is trace tricuspid regurgitation.  Right ventricle systolic pressure estimate normal  There is mild (1+) aortic regurgitation.  There is no comparison study available.  ______________________________________________________________________________  Left Ventricle  The left ventricle is normal in size. There is normal left ventricular wall  thickness. The visual ejection fraction is 40-45%. Diastolic Doppler findings  (E/E' ratio and/or other parameters) suggest left ventricular filling  pressures are indeterminate. There is mild-moderate global hypokinesia of the  left ventricle.     Right Ventricle  The right ventricle is normal in structure, function and size.     Atria  Normal left atrial size. Right atrial size is normal. There is no atrial shunt  seen.     Mitral Valve  There is mild (1+) mitral regurgitation.     Tricuspid Valve  Right  ventricle systolic pressure estimate normal. There is trace tricuspid  regurgitation. IVC diameter >2.1 cm collapsing <50% with sniff suggests a high  RA pressure estimated at 15 mmHg or greater. The patient is on positive  pressure ventilation, making assessment of the IVC size less reliable for  determining central venous pressure. The right ventricular systolic pressure  is approximated at 17.8 mmHg plus the right atrial pressure.     Aortic Valve  The aortic valve is trileaflet with aortic valve sclerosis. There is mild (1+)  aortic regurgitation. No aortic stenosis is present.     Pulmonic Valve  The pulmonic valve is not well seen, but is grossly normal. There is trace  pulmonic valvular regurgitation.     Vessels  Normal size aorta.     Pericardium  There is no pericardial effusion.     Rhythm  Sinus rhythm was noted.  ______________________________________________________________________________  MMode/2D Measurements & Calculations  IVSd: 1.1 cm     LVIDd: 4.2 cm  LVIDs: 3.1 cm  LVPWd: 1.1 cm  FS: 26.2 %  LV mass(C)d: 157.1 grams  LV mass(C)dI: 87.0 grams/m2  Ao root diam: 3.3 cm  LA dimension: 3.5 cm  asc Aorta Diam: 2.8 cm  LA/Ao: 1.1  LA Volume (BP): 62.0 ml  LA Volume Index (BP): 34.3 ml/m2  RWT: 0.52     Doppler Measurements & Calculations  MV E max john: 63.0 cm/sec  MV A max john: 37.5 cm/sec  MV E/A: 1.7  MV dec time: 0.34 sec  TR max john: 210.3 cm/sec  TR max P.8 mmHg  E/E' av.0  Lateral E/e': 12.3  Medial E/e': 11.6     ______________________________________________________________________________  Report approved by: Darío Birch 2022 01:09 PM               Discharge Medications   Current Discharge Medication List      START taking these medications    Details   alcohol swab prep pads Use to swab area of injection/eli as directed.  Qty: 100 each, Refills: 3    Associated Diagnoses: New onset type 2 diabetes mellitus (H)      blood glucose (NO BRAND SPECIFIED) test strip Use to  test blood sugar oncedaily or as directed. To accompany: Blood Glucose Monitor Brands: per insurance.  Qty: 100 strip, Refills: 6    Associated Diagnoses: New onset type 2 diabetes mellitus (H)      blood glucose calibration (NO BRAND SPECIFIED) solution To accompany: Blood Glucose Monitor Brands: per insurance.  Qty: 2 each, Refills: 0    Associated Diagnoses: New onset type 2 diabetes mellitus (H)      blood glucose monitoring (NO BRAND SPECIFIED) meter device kit Use to test blood sugar once daily or as directed. Preferred blood glucose meter OR supplies to accompany: Blood Glucose Monitor Brands: per insurance.  Qty: 1 kit, Refills: 0    Associated Diagnoses: New onset type 2 diabetes mellitus (H)      rivaroxaban ANTICOAGULANT (XARELTO ANTICOAGULANT) 20 MG TABS tablet Take 1 tablet (20 mg) by mouth daily (with dinner)  Qty: 30 tablet, Refills: 0    Associated Diagnoses: Pneumonia due to 2019 novel coronavirus      thin (NO BRAND SPECIFIED) lancets Use with lanceting device to monitor blood daily or as directed. To accompany: Blood Glucose Monitor Brands: per insurance.  Qty: 100 each, Refills: 0    Associated Diagnoses: New onset type 2 diabetes mellitus (H)         CONTINUE these medications which have NOT CHANGED    Details   albuterol (PROAIR HFA/PROVENTIL HFA/VENTOLIN HFA) 108 (90 Base) MCG/ACT inhaler Inhale 2 puffs into the lungs every 6 hours  Qty: 18 g, Refills: 0    Comments: Pharmacy may dispense brand covered by insurance (Proair, or proventil or ventolin or generic albuterol inhaler)  Associated Diagnoses: Wheezing      cyclobenzaprine (FLEXERIL) 10 MG tablet Take 0.5-1 tablets (5-10 mg) by mouth 3 times daily as needed for muscle spasms  Qty: 30 tablet, Refills: 0      oxazepam (SERAX) 10 MG capsule TAKE 1 CAPSULE BY MOUTH TWO TIMES A DAY AS NEEDED FOR ANXIETY (VERTIGO)  Qty: 40 capsule, Refills: 1    Associated Diagnoses: Vertigo      Omega-3 Fatty Acids (OMEGA 3 PO) Take by mouth daily          STOP taking these medications       hydrochlorothiazide (HYDRODIURIL) 25 MG tablet Comments:   Reason for Stopping:             Allergies   Allergies   Allergen Reactions     Codeine      Eggs [Eggs]      Milk Products      Nsaids      Intolerance

## 2022-02-07 NOTE — PROGRESS NOTES
Patient has been assessed for Home Oxygen needs. Oxygen readings:    *Pulse oximetry (SpO2) = 92% on room air at rest while awake.    *SpO2 improved to N/A % on 0 liters/minute at rest.    *SpO2 = 87% on room air during activity/with exercise.    *SpO2 improved to 92% on 0 liters/minute during activity/with exercise.

## 2022-02-07 NOTE — PROGRESS NOTES
Patient has been assessed for Home Oxygen needs.     Pulse oximetry (SpO2) and Oxygen flow readings:    SpO2 = 93% on room air at rest while awake.    SpO2 improved to 95% on 2 liters/minute at rest.    SpO2 = 91% on room air during activity/with exercise.    *SpO2 improved to 94% on 2 liters/minute during activity/with exercise.

## 2022-02-08 ENCOUNTER — PATIENT OUTREACH (OUTPATIENT)
Dept: NURSING | Facility: CLINIC | Age: 67
End: 2022-02-08
Payer: COMMERCIAL

## 2022-02-08 DIAGNOSIS — U07.1 PNEUMONIA DUE TO 2019 NOVEL CORONAVIRUS: Primary | ICD-10-CM

## 2022-02-08 DIAGNOSIS — J12.82 PNEUMONIA DUE TO 2019 NOVEL CORONAVIRUS: Primary | ICD-10-CM

## 2022-02-08 SDOH — ECONOMIC STABILITY: FOOD INSECURITY: WITHIN THE PAST 12 MONTHS, YOU WORRIED THAT YOUR FOOD WOULD RUN OUT BEFORE YOU GOT MONEY TO BUY MORE.: NEVER TRUE

## 2022-02-08 SDOH — ECONOMIC STABILITY: TRANSPORTATION INSECURITY
IN THE PAST 12 MONTHS, HAS THE LACK OF TRANSPORTATION KEPT YOU FROM MEDICAL APPOINTMENTS OR FROM GETTING MEDICATIONS?: NO

## 2022-02-08 SDOH — ECONOMIC STABILITY: FOOD INSECURITY: WITHIN THE PAST 12 MONTHS, THE FOOD YOU BOUGHT JUST DIDN'T LAST AND YOU DIDN'T HAVE MONEY TO GET MORE.: NEVER TRUE

## 2022-02-08 SDOH — ECONOMIC STABILITY: TRANSPORTATION INSECURITY
IN THE PAST 12 MONTHS, HAS LACK OF TRANSPORTATION KEPT YOU FROM MEETINGS, WORK, OR FROM GETTING THINGS NEEDED FOR DAILY LIVING?: NO

## 2022-02-08 SDOH — HEALTH STABILITY: PHYSICAL HEALTH: ON AVERAGE, HOW MANY MINUTES DO YOU ENGAGE IN EXERCISE AT THIS LEVEL?: 0 MIN

## 2022-02-08 SDOH — HEALTH STABILITY: PHYSICAL HEALTH: ON AVERAGE, HOW MANY DAYS PER WEEK DO YOU ENGAGE IN MODERATE TO STRENUOUS EXERCISE (LIKE A BRISK WALK)?: 0 DAYS

## 2022-02-08 ASSESSMENT — ACTIVITIES OF DAILY LIVING (ADL): DEPENDENT_IADLS:: INDEPENDENT

## 2022-02-08 ASSESSMENT — SOCIAL DETERMINANTS OF HEALTH (SDOH)
DO YOU BELONG TO ANY CLUBS OR ORGANIZATIONS SUCH AS CHURCH GROUPS UNIONS, FRATERNAL OR ATHLETIC GROUPS, OR SCHOOL GROUPS?: NO
HOW HARD IS IT FOR YOU TO PAY FOR THE VERY BASICS LIKE FOOD, HOUSING, MEDICAL CARE, AND HEATING?: NOT VERY HARD
HOW OFTEN DO YOU GET TOGETHER WITH FRIENDS OR RELATIVES?: ONCE A WEEK
IN A TYPICAL WEEK, HOW MANY TIMES DO YOU TALK ON THE PHONE WITH FAMILY, FRIENDS, OR NEIGHBORS?: ONCE A WEEK
HOW OFTEN DO YOU ATTENT MEETINGS OF THE CLUB OR ORGANIZATION YOU BELONG TO?: 1 TO 4 TIMES PER YEAR

## 2022-02-08 NOTE — PROGRESS NOTES
Clinic Care Coordination Contact    Clinic Care Coordination Contact  OUTREACH    Referral Information:  Referral Source: IP Report    Primary Diagnosis: Pneumonia    Chief Complaint   Patient presents with     Clinic Care Coordination - Post Hospital     Clinic Care Coordination RN         Universal Utilization:   [facility]  Hospitalist Discharge Summary       Date of Admission:  1/26/2022  Date of Discharge:  2/7/2022  Discharge Diagnoses     Principal Problem:    Pneumonia due to 2019 novel coronavirus  Active Problems:    Hypertension    Acute respiratory failure with hypoxia (H)    Multiple subsegmental pulmonary emboli without acute cor pulmonale (H)    Hyponatremia-admit Na 131    Hypopotassemia-admit K 3.0    Abnormal transaminases    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    SIRS (systemic inflammatory response syndrome) (H)-tachycardia, tachypnea, leukocytosis    Lymphopenia due to COVID-19 virus    LBBB (left bundle branch block)-chronic    Obesity    New onset type 2 diabetes mellitus (H)-A1c 6.5    Cardiomyopathy (H)-LV EF 40-45%    Hypernatremia  Clinic Utilization  Difficulty keeping appointments:: No  Compliance Concerns: No  No-Show Concerns: No  No PCP office visit in Past Year: No  Utilization    Hospital Admissions  1             ED Visits  2             No Show Count (past year)  0                Current as of: 2/7/2022  9:03 PM              Clinical Concerns:  Current Medical Concerns:  No    Current Behavioral Concerns: No    Education Provided to patient: CC RN role introduced.   Introductory letter and care plan sent via My Chart    Pain  Pain (GOAL):: No  Health Maintenance Reviewed: Not assessed  Clinical Pathway: Clinic Care Coordination Pneumonia Assessment  Discharge:    Day of hospital discharge: 2/7/2022    What recommendations were made for follow up after your recent  hospitalization? Xarelto     Have the follow up appointments been  scheduled? Yes    If not, can I help you set up these appointments? No         Is there a referral/need for Pulmonary Rehab? Yes    Symptom Review:    For patients with DM:     - Are you monitoring your blood sugars, if so how are your blood sugars? New diagnosis Morning blood sugar 107  Patient will make a future CDE appointment   - Did you start on insulin in the hospital or has your Insulin dose changed? No    Review with patient how to use/maintain nebulizers and inhalers: Yes  Activity:    How much activity can you do before you are SOB? Very little Oximeter goes down to 86% but recovers with rest     Were you instructed on how to cough and deep breathe? Yes, Reviewed today   Diet:    Are there any current diet restrictions or changes per discharge   Emotions/Lifestyle:      Do you smoke? reports that she has quit smoking. She smoked 0.00 packs per day for 0.00 years. She has never used smokeless tobacco.    Medication Management:  Medication review status: Medications reviewed.  Changes noted per patient report.       Functional Status:  Dependent ADLs:: Independent  Dependent IADLs:: Independent  Bed or wheelchair confined:: No  Mobility Status: Independent    Living Situation:  Current living arrangement:: I live in a private home    Lifestyle & Psychosocial Needs:    Social Determinants of Health     Tobacco Use: Medium Risk     Smoking Tobacco Use: Former Smoker     Smokeless Tobacco Use: Never Used   Alcohol Use: Not on file   Financial Resource Strain: Low Risk      Difficulty of Paying Living Expenses: Not very hard   Food Insecurity: No Food Insecurity     Worried About Running Out of Food in the Last Year: Never true     Ran Out of Food in the Last Year: Never true   Transportation Needs: No Transportation Needs     Lack of Transportation (Medical): No     Lack of Transportation (Non-Medical): No   Physical Activity: Inactive     Days of Exercise per Week: 0 days     Minutes of Exercise per Session: 0  min   Stress: Not on file   Social Connections: Unknown     Frequency of Communication with Friends and Family: Once a week     Frequency of Social Gatherings with Friends and Family: Once a week     Attends Taoism Services: Not on file     Active Member of Clubs or Organizations: No     Attends Club or Organization Meetings: 1 to 4 times per year     Marital Status:    Intimate Partner Violence: Not on file   Depression: Not at risk     PHQ-2 Score: 1   Housing Stability: Not on file     Diet:: Diabetic diet  Inadequate nutrition (GOAL):: No  Tube Feeding: No  Inadequate activity/exercise (GOAL):: No  Significant changes in sleep pattern (GOAL): No  Transportation means:: Family     Taoism or spiritual beliefs that impact treatment:: No  Mental health DX:: Yes  Mental health DX how managed:: Medication  Mental health management concern (GOAL):: No  Chemical Dependency Status: No Current Concerns  Informal Support system:: Children           Resources and Interventions:  Current Resources:      Community Resources: None  Supplies used at home:: Oxygen Tubing/Supplies  Equipment Currently Used at Home: none            Advance Care Plan/Directive  Advanced Care Plans/Directives on file:: No    Referrals Placed: None     Goals:   Goals        General     Medical (pt-stated)      Notes - Note created  2/8/2022 11:35 AM by Nola Nugent RN     Goal Statement: I will increase my strength and breathe better in the next month   Date Goal set: 2/8/2022  Barriers: Deconditioned   Strengths: Daughter is staying for 2 weeks   Date to Achieve By: 3/8/2022  Patient expressed understanding of goal: Yes  Action steps to achieve this goal:  1. I will use oxygen at 1.5 litters at night and with activity   2. I will keep my oxygen level above 90%  I will call with increased shortness of breath episodes,fever or coughing up a colored sputum   3. I will check my weight daily and call if weight gain is 2-3# in a day or 5#  in a week   4. I will rest,drink plenty of water ,cough and deep breath   5. I will participate with the Get Well Loop             Patient/Caregiver understanding: Expresses good understanding of discharge instructions     Outreach Frequency: weekly  Future Appointments              In 6 days Schoen, Gregory G, MD [facility]    In 3 weeks Schoen, Gregory G, MD [facility]          Plan:   1. Patient will keep hospital follow up with PCP 2/14  3. Patient will continue oxygen at sleep and during activity at 1.5 liters   4. Patient will follow COVID restrictions and call with increased symptoms of concern   5. Patient will check blood sugar and weight daily   6. CC RN will follow up in 3-5 business days     [facility]   Nola Nugent RN, Care Coordinator   [facility]'s   E-mail [email or web address]   [phone]

## 2022-02-08 NOTE — LETTER
[facility]  Patient Centered Plan of Care  About Me:        Patient Name:  Stacie Vasquez    YOB: 1955  Age:         66 year old   [place] MRN:    [id number] Telephone Information:  Home Phone [phone]   Mobile [phone]       Address:  [address]  [place] [address] Email address:  [email or web address]      Emergency Contact(s)    Name Relationship Lgl Grd Work Phone Home Phone Mobile Phone   1. CHRISTEL BILLS* Daughter   [phone]    2. CORNELIUS VASQUEZ Spouse   [phone]   3. ALLIE GONZALEZ   [phone]           Primary language:  English     needed? No   [facility] [employer]:  [phone] op. 1  Other communication barriers:None    Preferred Method of Communication:  Mail  Current living arrangement: I live in a private home    Mobility Status/ Medical Equipment: Independent        Health Maintenance  Health Maintenance Reviewed:   Health Maintenance Due   Topic Date Due     DEXA  Never done     DIABETIC FOOT EXAM  Never done     EYE EXAM  Never done     COVID-19 Vaccine (1) Never done     HIV SCREENING  Never done     ZOSTER IMMUNIZATION (1 of 2) Never done     ADVANCE CARE PLANNING  09/09/2020     MAMMO SCREENING  09/11/2020     MEDICARE ANNUAL WELLNESS VISIT  07/02/2021     FALL RISK ASSESSMENT  Never done     Pneumococcal Vaccine: Pediatrics (0 to 5 Years) and At-Risk Patients (6 to 64 Years) (1 of 1 - PPSV23) Never done     Pneumococcal Vaccine: 65+ Years (1 of 1 - PPSV23) Never done     DTAP/TDAP/TD IMMUNIZATION (2 - Td or Tdap) 07/15/2021     INFLUENZA VACCINE (1) Never done     COLORECTAL CANCER SCREENING  09/26/2021     PHQ-2  01/01/2022         My Access Plan  Medical Emergency 911   Primary Clinic Line [facility] - [phone]   24 Hour Appointment Line [phone] or  [phone]) (toll-free)   24 Hour Nurse Line [phone] (toll-free)   Preferred  Urgent Care [facility], [phone]     Preferred Hospital [facility] [place]  [phone]     Preferred Pharmacy [id number] - [place], [place] - [address]      Behavioral Health Crisis Line The National Suicide Prevention Lifeline at 1-943.391.8754 or 911       My Care Team Members  Patient Care Team       Relationship Specialty Notifications Start End    Schoen, Gregory G, MD PCP - General   10/29/00     Phone: [phone] Fax: [phone] [address] [place] [address]    Schoen, Gregory G, MD Assigned PCP   9/13/15     Phone: [phone] Fax: [phone] [address] [place] [address]    Fuentes Mina MD Assigned Surgical Provider   10/23/20     Phone: [phone] Pager: [phone] Fax: [phone] [address] [place] [address]    Carlota Novoa APRN CNP Assigned Neuroscience Provider   6/13/21     Phone: [phone] Fax: [phone]         [address] [facility] [address]    Nola Nugent RN Clinic Care Coordinator Primary Care - CC Admissions 2/7/22     Phone: [phone]                 My Care Plans  Self Management and Treatment Plan  Goals and (Comments)  Goals        General     Medical (pt-stated)      Notes - Note created  2/8/2022 11:35 AM by Nola Nugent, DAYDAY     Goal Statement: I will increase my strength and breathe better in the next month   Date Goal set: 2/8/2022  Barriers: Deconditioned   Strengths: Daughter is staying for 2 weeks   Date to Achieve By: 3/8/2022  Patient expressed understanding of goal: Yes  Action steps to achieve this goal:  1. I will use oxygen at 1.5 litters at night and with activity   2. I will keep my oxygen level above 90%  I will call with increased shortness of breath episodes,fever or coughing up a colored sputum   3. I will check my weight daily and call if weight gain is 2-3# in a day or 5# in a week    4. I will rest,drink plenty of water ,cough and deep breath   5. I will participate with the Get Well Loop              Action Plans on File:    None     Advance Care Plans/Directives Type:   No data recorded    My Medical and Care Information  Problem List   Patient Active Problem List   Diagnosis     Female stress incontinence     Family history of coronary artery disease     Hyperlipidemia LDL goal <130     Hypertension     Benign positional vertigo     Migraines     Acute respiratory failure with hypoxia (H)     Multiple subsegmental pulmonary emboli without acute cor pulmonale (H)     Pneumonia due to 2019 novel coronavirus     Hyponatremia-admit Na 131     Hypopotassemia-admit K 3.0     Abnormal transaminases     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     SIRS (systemic inflammatory response syndrome) (H)-tachycardia, tachypnea, leukocytosis     Lymphopenia due to COVID-19 virus     LBBB (left bundle branch block)-chronic     Obesity     New onset type 2 diabetes mellitus (H)-A1c 6.5     Cardiomyopathy (H)-LV EF 40-45%     Hypernatremia      Current Medications and Allergies:    Current Outpatient Medications   Medication     albuterol (PROAIR HFA/PROVENTIL HFA/VENTOLIN HFA) 108 (90 Base) MCG/ACT inhaler     alcohol swab prep pads     blood glucose (NO BRAND SPECIFIED) test strip     blood glucose calibration (NO BRAND SPECIFIED) solution     blood glucose monitoring (NO BRAND SPECIFIED) meter device kit     cyclobenzaprine (FLEXERIL) 10 MG tablet     Omega-3 Fatty Acids (OMEGA 3 PO)     oxazepam (SERAX) 10 MG capsule     rivaroxaban ANTICOAGULANT (XARELTO ANTICOAGULANT) 20 MG TABS tablet     thin (NO BRAND SPECIFIED) lancets     No current facility-administered medications for this visit.       Care Coordination Start Date: 2/8/2022   Frequency of Care Coordination: weekly     Form Last Updated: 02/08/2022

## 2022-02-08 NOTE — LETTER
[facility] CARE COORDINATION  [address]   [place] [address]  February 8, 2022    Stacie Hair  [address]  [place] [address]      Dear Stacie,    I am a clinic care coordinator who works with Gregory G. Schoen, MD at [facility] . I wanted to thank you for spending the time to talk with me.  Below is a description of clinic care coordination and how I can further assist you.      The clinic care coordination team is made up of a registered nurse,  and community health worker who understand the health care system. The goal of clinic care coordination is to help you manage your health and improve access to the health care system in the most efficient manner. The team can assist you in meeting your health care goals by providing education, coordinating services, strengthening the communication among your providers and supporting you with any resource needs.    Please feel free to contact me at [phone] with any questions or concerns. We are focused on providing you with the highest-quality healthcare experience possible and that all starts with you.     Sincerely,     [facility]   Nola Nugent RN, Care Coordinator   [facility]'s   E-mail [email or web address]   [phone]    Enclosed: I have enclosed a copy of the Patient Centered Plan of Care. This has helpful information and goals that we have talked about. Please keep this in an easy to access place to use as needed.

## 2022-02-11 ENCOUNTER — PATIENT OUTREACH (OUTPATIENT)
Dept: NURSING | Facility: CLINIC | Age: 67
End: 2022-02-11
Payer: COMMERCIAL

## 2022-02-11 DIAGNOSIS — U07.1 PNEUMONIA DUE TO 2019 NOVEL CORONAVIRUS: Primary | ICD-10-CM

## 2022-02-11 DIAGNOSIS — J12.82 PNEUMONIA DUE TO 2019 NOVEL CORONAVIRUS: Primary | ICD-10-CM

## 2022-02-11 ASSESSMENT — ACTIVITIES OF DAILY LIVING (ADL): DEPENDENT_IADLS:: INDEPENDENT

## 2022-02-11 NOTE — PROGRESS NOTES
Clinic Care Coordination Contact    Follow Up Progress Note   Blood sugar in the morning 101-133 weight gain about a 1# a day.  Oximeter readings average 94-95 %     Assessment:   Clinic Care Coordination Contact    Referral Type: Virtual Home Monitoring - GetWell Loop Program    Are you feeling better, the same, or worse since your Hospital/ED visit? better    Symptoms:     Cough -  none    Shortness of breath:  improved shortness of breath since last visit     Chest pain:  No    Fever: No    Current temperature:  98.1    Fatigue:  Yes    Home treatment measures used and outcome:  rest fluids and pacing activity   Pulse Oximeter/Oxygen Questions:    Were you sent home with a pulse oximeter?  Yes    Are you currently utilizing the pulse oximeter?  Yes    Do you understand how to use the home oximeter?  Yes    What are your current oxygen saturation levels?    Oximetry reading can go below 90 % intermittently with increased activity.  Uses oxygen at 1.5 liters with activity and at night.  One isolated reading of 70% during the night quickly recovered when she increased oxygen liter at 2 liters   Recent symptoms is back of right hand numbness and double vision that lasted 1 minute.  CC RN will notify PCP of theses symptoms  Patient has a hospital follow up 2/14/2022  Oxygen saturation levels in ED/IP:  93%    Were you sent home with home oxygen?  Yes    Are you currently utilizing the oxygen?  Yes    What liter flow were you instructed to use?  1.5 liters with activity and at night     What liter flow are you using to maintain your oxygen saturation:  1.5 liters     Who is your supply company?  [facility]       Follow Up:    Do you have a follow up appointment scheduled with your PCP or specialist?  Yes    Do you have a plan in place in the event of an emergency?  Yes    If patient is established or planning to establish primary care within [facility], Care Coordination was offered. Care Coordination  accepted/declined:  accepted     GetWell Loop invitation received?  Yes    GetWell Loop invitation accepted, pending patient activation or declined:  activated   Care Gaps:    Health Maintenance Due   Topic Date Due     DEXA  Never done     DIABETIC FOOT EXAM  Never done     EYE EXAM  Never done     COVID-19 Vaccine (1) Never done     HIV SCREENING  Never done     ZOSTER IMMUNIZATION (1 of 2) Never done     ADVANCE CARE PLANNING  09/09/2020     MAMMO SCREENING  09/11/2020     MEDICARE ANNUAL WELLNESS VISIT  07/02/2021     FALL RISK ASSESSMENT  Never done     Pneumococcal Vaccine: Pediatrics (0 to 5 Years) and At-Risk Patients (6 to 64 Years) (1 of 1 - PPSV23) Never done     Pneumococcal Vaccine: 65+ Years (1 of 1 - PPSV23) Never done     DTAP/TDAP/TD IMMUNIZATION (2 - Td or Tdap) 07/15/2021     INFLUENZA VACCINE (1) Never done     COLORECTAL CANCER SCREENING  09/26/2021     PHQ-2  01/01/2022       Not discussed today     Goals addressed this encounter:   Goals Addressed                    This Visit's Progress       Medical (pt-stated)   30%      Goal Statement: I will increase my strength and breathe better in the next month   Date Goal set: 2/8/2022  Barriers: Deconditioned   Strengths: Daughter is staying for 2 weeks   Date to Achieve By: 3/8/2022  Patient expressed understanding of goal: Yes  Action steps to achieve this goal:  1. I will use oxygen at 1.5 litters at night and with activity   2. I will keep my oxygen level above 90%  I will call with increased shortness of breath episodes,fever or coughing up a colored sputum   3. I will check my weight daily and call if weight gain is 2-3# in a day or 5# in a week   4. I will rest,drink plenty of water ,cough and deep breath   5. I will participate with the Get Well Loop             Intervention/Education provided during outreach: Call with increased weight,SOB or consistently low Oximeter reading below 90%  Outreach Frequency: weekly    Plan:   1. Patient will  seek medical help if Oximeter reading consistently under 90%,increased SOB or fever  2. Patient will continue to check her blood sugar and weight once a day and call if weight gain 2-3# in a day or 5# in a week    3. Hospital follow up with PCP 2/14/2022  CC RN will notify PCP of 1 minute episode of double vision  and back of right hand numbness   4. Patient continues to participate in the Get Well Loop Program   5. Care Coordinator will follow up in 3-5 business days     [facility]   Nola Nugent RN, Care Coordinator   [facility]'s   E-mail [email or web address]   [phone]

## 2022-02-14 ENCOUNTER — VIRTUAL VISIT (OUTPATIENT)
Dept: FAMILY MEDICINE | Facility: CLINIC | Age: 67
End: 2022-02-14
Payer: COMMERCIAL

## 2022-02-14 DIAGNOSIS — J12.82 PNEUMONIA DUE TO 2019 NOVEL CORONAVIRUS: Primary | ICD-10-CM

## 2022-02-14 DIAGNOSIS — E27.9 ADRENAL ABNORMALITY (H): ICD-10-CM

## 2022-02-14 DIAGNOSIS — I26.94 MULTIPLE SUBSEGMENTAL PULMONARY EMBOLI WITHOUT ACUTE COR PULMONALE (H): ICD-10-CM

## 2022-02-14 DIAGNOSIS — I10 HYPERTENSION, UNSPECIFIED TYPE: ICD-10-CM

## 2022-02-14 DIAGNOSIS — E11.65 TYPE 2 DIABETES MELLITUS WITH HYPERGLYCEMIA, WITHOUT LONG-TERM CURRENT USE OF INSULIN (H): ICD-10-CM

## 2022-02-14 DIAGNOSIS — K76.0 FATTY LIVER: ICD-10-CM

## 2022-02-14 DIAGNOSIS — J96.01 ACUTE RESPIRATORY FAILURE WITH HYPOXIA (H): ICD-10-CM

## 2022-02-14 DIAGNOSIS — U07.1 PNEUMONIA DUE TO 2019 NOVEL CORONAVIRUS: Primary | ICD-10-CM

## 2022-02-14 DIAGNOSIS — I42.9 CARDIOMYOPATHY, UNSPECIFIED TYPE (H): ICD-10-CM

## 2022-02-14 PROBLEM — E87.0 HYPERNATREMIA: Status: RESOLVED | Noted: 2022-01-29 | Resolved: 2022-02-14

## 2022-02-14 PROBLEM — R65.10 SIRS (SYSTEMIC INFLAMMATORY RESPONSE SYNDROME) (H): Status: RESOLVED | Noted: 2022-01-26 | Resolved: 2022-02-14

## 2022-02-14 PROBLEM — E87.1 HYPONATREMIA: Status: RESOLVED | Noted: 2022-01-26 | Resolved: 2022-02-14

## 2022-02-14 PROBLEM — E87.6 HYPOPOTASSEMIA: Status: RESOLVED | Noted: 2022-01-26 | Resolved: 2022-02-14

## 2022-02-14 PROBLEM — E11.9 NEW ONSET TYPE 2 DIABETES MELLITUS (H): Status: RESOLVED | Noted: 2022-01-27 | Resolved: 2022-02-14

## 2022-02-14 PROCEDURE — 99496 TRANSJ CARE MGMT HIGH F2F 7D: CPT | Mod: 95 | Performed by: FAMILY MEDICINE

## 2022-02-14 NOTE — PROGRESS NOTES
Stacie is a 66 year old who is being evaluated via a billable video visit.      How would you like to obtain your AVS? MyChart  If the video visit is dropped, the invitation should be resent by: Text to cell phone: [phone]  Will anyone else be joining your video visit? No      Video Start Time: 2:48 PM    Assessment & Plan     Pneumonia due to 2019 novel coronavirus  Acute respiratory failure with hypoxia (H)  Admitted on 1/26/2022 with significant hypoxia secondary to Covid pneumonia and bilateral pulmonary emboli.  She was treated with Tocilizumab and dexamethasone for Covid infection and was subsequently discharged home on oxygen at 2 to 3 L/min.  She continues to use 2 L/min with any sort of activity during the day and then sleeps with oxygen throughout the night at same level.  If she is sitting still and completely inactive she is able to maintain saturations of 92% but with any activity this will diminish into the mid to upper 80s.  Initially they would drop below her and at one point did drop into the 70s but quickly recovered with oxygen.  We discussed that she should continue with oxygen as needed to maintain saturations over 92% and until such time that she is able to be off of oxygen throughout the day with her usual activities we would want her to continue with oxygen throughout the night.  The goal would be to reach a point in time of not needing oxygen during the day at all without desaturating and then would want to do an overnight measurement or hypoxia before completely discontinuing her oxygen.  We discussed that both her Covid pneumonia are affecting the membranous exchange of oxygen and there pulmonary emboli or reducing the area of effective vascularization of her lungs and that how long it takes for her lungs to heal from these injuries is uncertain and that we need to continue monitoring as noted above.  She does have a follow-up with me in 2-1/2 weeks and will reassess how she is doing  at that time but is advised to send my chart message or call as needed during that interval between.    Multiple subsegmental pulmonary emboli without acute cor pulmonale (H)  Currently is on Xarelto at therapeutic dosing for treatment of blood clots.  She is tolerating this without evidence of bleeding.  We discussed that a minimum of 3 months of treatment would be indicated but would suggest given her abnormal findings on her CT scan and her lungs as well as fatty liver and adrenal abnormalities that repeating a CT scan at that time to assess persistence of clot burden in her lungs would be appropriate.  Reviewed at that point give consideration to discontinuing or continuing with anticoagulation.    Type 2 diabetes mellitus with hyperglycemia, without long-term current use of insulin (H)   During her hospital stay she was opposed to starting any medication for treatment of her diabetes but in doing some reading and discussion with her daughter who is a nurse there is interested in starting on Metformin for better blood sugar control as well as this being an adjunct for weight loss.  We discussed variable evidence for benefit in prediabetic stage as well as early diabetes with a well-controlled A1c.  Also discussed side effects but there not being any significant risk of hypoglycemia.  With that we will proceed with starting on Metformin 500 mg twice daily.  She is limited to supplies for once daily testing of her blood sugars I have asked her to start rotating the time of day and keeping a log so that she is checking a.m. fasting, preprandial at lunch, preprandial at supper and also at at bedtime so that over the next 3 weeks we can get a look at how her blood sugars vary throughout the day.  We also discussed formal diabetes education and she is very much interested.  Referral has been placed for this.  We noted that she is as of today considered Covid recovered having reached day 21 from known onset of Covid  infection.  However with her need for oxygen with activity she wishes to limit her need to go out in the public and can discuss whether this would be a virtual face-to-face visit.  As noted above, we will see her back on 3/4/2022 and we will review her blood sugars at that time while taking Metformin 500 mg twice daily.  We also briefly discussed other recommendations and suggestions about medication treatment in patients with diabetes.  Consideration for statin therapy however we should evaluate her liver issues at this time prior to starting any statins.  - metFORMIN (GLUCOPHAGE) 500 MG tablet; Take 1 tablet (500 mg) by mouth 2 times daily (with meals)  - [facility] Adult Diabetes Educator Referral; Future    Cardiomyopathy, unspecified type (H)  It is probable that this is a byproduct of her Covid infection and as such it is hopeful that this will recover.  Given her prior to illness level of activity it is probable she would tolerate an EF of 40 to 45% without much difficulty.  We will plan on rechecking an echocardiogram in 2 months to assess reversibility of this finding.    Fatty liver  This was present on a CT scan noted last October and the patient did discontinue alcohol consumption.  Throughout her hospital stay for COVID-19 infection she showed worsening of her AST and ALT.  She had markedly low protein and albumin levels which did improve significantly by the time of discharge.  Will discuss with radiology to see if there is evidence of improvement between October and January with cessation of alcohol.  - Hepatic panel (Albumin, ALT, AST, Bili, Alk Phos, TP); Future    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  This was an incidental finding on her chest CT and a follow-up CT was recommended.  Since the plan is to repeat a CT chest to evaluate clot burden in 3 months we will get another look at the adrenals at that time and determine if further follow-up is indicated.    Hypertension, unspecified  "type  Currently is not on any treatment as her blood pressures have been normal to low normal.  We will continue to monitor this and should treatment become necessary again will weigh pros and cons of starting ACE/ARB versus beta-blocker given her underlying cardiomyopathy and diabetes.      Review of prior external note(s) from - recent admission discharge summary  Review of the result(s) of each unique test - echocardiogram, CT scan reports and labs         BMI:   Estimated body mass index is 28.39 kg/m  as calculated from the following:    Height as of 1/26/22: 1.575 m (5' 2\").    Weight as of 2/4/22: 70.4 kg (155 lb 3.2 oz).   Weight management plan: diabetes diet education        Return in about 3 weeks (around 3/7/2022) for in person.    Gregory G. Schoen, MD  [facility] CenterETH[facility] Quinones is a 66 year old who presents for the following health issues, accompanied by her daughter.      [facility]       Hospital Follow-up Visit:    Hospital/Nursing Home/ Rehab Facility: [facility]  Date of Admission: 1/26/22  Date of Discharge: 2/07/2022Reason(s) for Admission: pneumonia due to covid      Was your hospitalization related to COVID-19? YES   How are you feeling today? still needing oxygen   In the past 24 hours have you had shortness of breath when speaking, walking, or climbing stairs? My breathing issues have improved  Do you have a cough? Yes, I have a cough but it's not worse  When is the last time you had a fever greater than 100? When in hospital  Are you having any other symptoms? Headaches   Do you have any other stressors you would like to discuss with your provider? No             Was the patient in the ICU or did the patient experience delirium during hospitalization?  Yes    Was in ICU and has some \"brain fog\" but no delerium.         Problems taking medications regularly:  None  Medication changes since discharge: None  Problems adhering to " "non-medication therapy:  None    Summary of hospitalization:  [facility] hospital discharge summary reviewed  Diagnostic Tests/Treatments reviewed.  Follow up needed: Several factors need follow up.    Covid 19 Pneumonia and bilateral pulmonary emboli.  Reports she still needs oxygen 2 liters with any activity at all.  Will now maintain sats around 92% if fully at rest but limited activity will cause sats to drop, usually into the mid 80s but did go into 70s once with a bit more exertion. While in the hospital she did require bipap for ventilatory support and did receive dexamethasone and Actemra but not remdesivir.  She also was treated initially with heparin, followed by Eliquis and was discharged on Xarelto for insurance reasons for management of the bilateral PEs.  CT scan did show evidence of pneumonia changes as well as the PE, so both factors impacting her oxygenation.  Her CRP was initially over 100 and was down to less than 10 at the time of discharge.    Diabetes: This is a new diagnosis for a backup.  She was given some \"just in time\" education while in the hospital but she certainly is interested in further education to better understand diabetes.  Sugars are running in 130s or lower a.m. fasting most of the time since monitoring.  She is just checking once daily. She is at 155 pounds but is down about 20 pounds from prior to admission.  She states she was told she should monitor her weight and let me know if she put on 5 pounds or more but was not quite understanding how this connected to diabetes.  Discussed that this more likely is related to her cardiomyopathy that was also noted during her admission.  During her hospital stay she had mentioned that she wished to avoid medications.  However she and her daughter did discuss her desire to consider Metformin for better baseline blood sugar control (hemoglobin A1c was 6.5%) and also potential benefit to aid in weight reduction.  She voiced " frustration at plateauing on her current weight loss and is motivated to further lose weight.  We discussed that this can be a beneficial side effect related to use of Metformin and that this along with diabetes education could be helpful.  We discussed the safety profile of Metformin and she has normal renal function so no dose adjustments are necessary.  Risk of acidosis with an EF of 40 to 45% are not concerning.    Cardiomyopathy: This also was a new diagnosis with this hospital stay.  She has a longstanding history of left bundle branch block pattern but appears to have normal valvular function and therefore it is presumed that this is a byproduct of her current COVID-19 infection and/or stress related Takotsubo cardiomyopathy.  Her activity is quite limited at this point related to her level of oxygenation and would doubt that she could challenge to the point of being symptomatic her EF of 40 to 45%.  Therefore this is essentially been clinically asymptomatic.  Trial of Coreg was initiated during her hospital stay but due to low blood pressures this was discontinued.    Hypertension: History of hypertension for which she was taking 12.5 mg of hydrochlorothiazide at the time of admission.  Her acute illness and respiratory status along with the use of this medication resulted in an initial hyponatremia and hypokalemia as well as was hypotensive.  Hydrochlorothiazide was discontinued on admission and has not been resumed and she reports her blood pressures at home are continuing to be on the low side as they were in the hospital.  She did not report any orthostatic symptoms.    Fatty Liver: Chest CT done on admission involve the upper abdomen and there was noted to be marked fatty infiltration of the liver.  This was also present on a CT scan done during an ER visit on 10/16/2021 and had not changed appreciably.  She had been consuming 1-2 alcoholic drinks on a daily basis but discontinued that when made aware  "of this in October.  CT scan with this recent admission again showed hepatic steatosis but was not described as \"marked\" as was previously noted.  She also had abnormal liver function tests during her hospital stay with her ALT at 62, total protein 5.6 and albumin 2.2.  At the time of discharge numbers had improved in regard to her protein being up to 6.3 and albumin up to 2.8, however her AST which was originally normal increased to 61 and her ALT increased from .    Adrenal Thickening: Incidental finding noted on her January CT scan showed hypodense thickening of the adrenal glands bilaterally without focal mass which appeared to be new as compared to her October 2021 scan.  A follow-up scan was recommended in approximately 6 months.    Hypertension: See comments above regarding low blood pressures during her hospital stay and discontinuation of hydrochlorothiazide.  Coreg was attempted but could not tolerate.  At this point is on no medications and is remaining with low/normal pressures.      Other Healthcare Providers Involved in Patient s Care:         Get Well [facility]  Update since discharge: improved.       Post Discharge Medication Reconciliation: discharge medications reconciled, continue medications without change.  Plan of care communicated with patient and family                Review of Systems   No current fever/chills.   HEENT: still with some headaches and brain fog  CVR: no chest pains, palpitations reported  RESP: as above  GI: no complaints.   MSK: generalized weakness  Psych: some issues with stress and concerns about that contributing to her elevated blood sugars/diabetes.       Objective           Vitals:  No vitals were obtained today due to virtual visit.  Current BMI with height 5-2 and weight 155 = 28.3       Physical Exam   GENERAL: Healthy, alert and no distress  EYES: Eyes grossly normal to inspection.  No discharge or erythema, or obvious scleral/conjunctival abnormalities.  RESP: No " audible wheeze, cough, or visible cyanosis.  No visible retractions or increased work of breathing.    SKIN: Visible skin clear. No significant rash, abnormal pigmentation or lesions.  NEURO: Cranial nerves grossly intact.  Mentation and speech appropriate for age.  PSYCH: Mentation appears normal, affect normal/bright, judgement and insight intact, normal speech and appearance well-groomed.                Video-Visit Details    Type of service:  Video Visit    Video End Time:3:28 PM    Originating Location (pt. Location): Home    Distant Location (provider location):  [facility]     Platform used for Video Visit: [employer]

## 2022-02-17 ENCOUNTER — LAB (OUTPATIENT)
Dept: LAB | Facility: CLINIC | Age: 67
End: 2022-02-17
Payer: COMMERCIAL

## 2022-02-17 ENCOUNTER — HOSPITAL ENCOUNTER (OUTPATIENT)
Dept: PHYSICAL THERAPY | Facility: CLINIC | Age: 67
Setting detail: THERAPIES SERIES
End: 2022-02-17
Attending: FAMILY MEDICINE
Payer: COMMERCIAL

## 2022-02-17 DIAGNOSIS — R26.81 GAIT INSTABILITY: ICD-10-CM

## 2022-02-17 DIAGNOSIS — K76.0 FATTY LIVER: ICD-10-CM

## 2022-02-17 DIAGNOSIS — R42 VERTIGO: ICD-10-CM

## 2022-02-17 LAB
ALBUMIN SERPL-MCNC: 3.8 G/DL (ref 3.4–5)
ALP SERPL-CCNC: 74 U/L (ref 40–150)
ALT SERPL W P-5'-P-CCNC: 125 U/L (ref 0–50)
AST SERPL W P-5'-P-CCNC: 41 U/L (ref 0–45)
BILIRUB DIRECT SERPL-MCNC: 0.2 MG/DL (ref 0–0.2)
BILIRUB SERPL-MCNC: 0.8 MG/DL (ref 0.2–1.3)
PROT SERPL-MCNC: 7.5 G/DL (ref 6.8–8.8)

## 2022-02-17 PROCEDURE — 80076 HEPATIC FUNCTION PANEL: CPT

## 2022-02-17 PROCEDURE — 36415 COLL VENOUS BLD VENIPUNCTURE: CPT

## 2022-02-17 PROCEDURE — 97110 THERAPEUTIC EXERCISES: CPT | Mod: GP | Performed by: PHYSICAL THERAPIST

## 2022-02-17 PROCEDURE — 97162 PT EVAL MOD COMPLEX 30 MIN: CPT | Mod: GP | Performed by: PHYSICAL THERAPIST

## 2022-02-17 NOTE — PROGRESS NOTES
Stacie Hair  Gender: female  : 1955  [id number]  [facility] [id number] [phone] (home)     Medical Record: [id number]  Pharmacy:    [id number] - [place], [place] - [address] [facility] [place], [place] - [address] [facility] [place], [place] - [address]   Primary Care Provider: Schoen, Gregory G    Parent's names are: Data Unavailable (mother) and Data Unavailable (father).      [facility]  2022     Discharge Phone Call:  Key Words/Key Times    1st attempt at call back. No answer. Message Left.

## 2022-02-21 ENCOUNTER — PATIENT OUTREACH (OUTPATIENT)
Dept: NURSING | Facility: CLINIC | Age: 67
End: 2022-02-21
Payer: COMMERCIAL

## 2022-02-21 DIAGNOSIS — J12.82 PNEUMONIA DUE TO 2019 NOVEL CORONAVIRUS: Primary | ICD-10-CM

## 2022-02-21 DIAGNOSIS — U07.1 PNEUMONIA DUE TO 2019 NOVEL CORONAVIRUS: Primary | ICD-10-CM

## 2022-02-21 ASSESSMENT — ACTIVITIES OF DAILY LIVING (ADL): DEPENDENT_IADLS:: INDEPENDENT

## 2022-02-21 NOTE — PROGRESS NOTES
02/17/22 1300   Quick Adds   Quick Adds Vestibular Eval   Type of Visit Initial OP PT Evaluation   General Information   Start of Care Date 02/17/22   Referring Physician Dr. Lo Almonte   Orders Evaluate and Treat as Indicated   Order Date 02/07/22   Medical Diagnosis Gait instability and vertigo.   Onset of illness/injury or Date of Surgery 01/26/22   Precautions/Limitations no known precautions/limitations   Surgical/Medical history reviewed Yes   Pertinent Visual History  reading glasses.   Prior level of functional mobility Ambulation   Ambulation independent   Prior level of function comment independent   Current Community Support Family/friend caregiver   Patient role/Employment history Retired   Living environment House/[facility]   Home/Community Accessibility Comments 3 steps    Current Assistive Devices Walker   ADL Devices Shower/Tub Chair   Patient/Family Goals Statement Improve balance and gait stability.   Fall Risk Screen   Fall screen completed by PT   Have you fallen 2 or more times in the past year? No   Have you fallen and had an injury in the past year? No   Timed Up and Go score (seconds) 16.5   Is patient a fall risk? Yes   Fall screen comments Gait belt and SBA when performing balance.   Abuse Screen (yes response referral indicated)   Feels Unsafe at Home or Work/School no   Feels Threatened by Someone no   Does Anyone Try to Keep You From Having Contact with Others or Doing Things Outside Your Home? no   Physical Signs of Abuse Present no   Patient needs abuse support services and resources No   Vitals Signs   Heart Rate 97   SpO2 100  (87% after activity.  )   Blood Pressure 122/79   Vital Signs Comments 2L O2 nasal canula.   Cognitive Status Examination   Orientation orientation to person, place and time   Level of Consciousness alert   Follows Commands and Answers Questions 100% of the time   Personal Safety and Judgment intact   Memory intact   Cognitive Comment Reports    ;      [facility]  [address], [place] [address]  [phone]     Clinical Summary     PERSON INFORMATION   Name TIESHA MALONEY Age  48 Years  1970 12:00 AM   Acct# [id number] Sex Female Phone [phone]   Dispo Type Home - (i.e. Home on oxygen, DME)-  Arrival 2019 3:07 PM Checkout 2019 4:08 PM    Address: [address] [place] [address]      Visit Reason R FLANK PAIN     ED Physician Note     Patient:   TIESHA MALONEY            MRN: [id number]            FIN: [id number]               Age:   48 years     Sex:  Female     :  1970   Associated Diagnoses:   None   Author:   Bakari Ortiz      History of Present Illness   49 y/o female here for eval of right lower back onset 3 weeks ago and on and off and worse with movement.   No injury or trauma. 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  Pt states this is not workman.  Patient states she is taking ibuprofen not helping.  Patient states in the past tramadol does help.  Denies any radiation to her abdomen or groin.  Denies any chest pains or shortness of breath.  Denies any radiation to her legs.  Denies any vaginal discharge or urinary symptoms or bowel or bladder changes.    Hx OA,  back pain, fibromyalgia  , hypothyroidism  Sx  none   Meds levothyroxine   Allergy Env  Social non smoker, no etoh, no drug use  PCP [facility] 2  week ago    .        Review of Systems   Constitutional symptoms:  No fever,    Skin symptoms:  No rash,    Eye symptoms:  No recent vision problems,    ENMT symptoms:  No nasal congestion,    Respiratory symptoms:  No shortness of breath,    Cardiovascular symptoms:  No chest pain,    Gastrointestinal symptoms:  No abdominal pain, no nausea, no vomiting, no diarrhea, no constipation, no rectal pain.    Genitourinary symptoms:  No dysuria, no hematuria, no vaginal bleeding, no vaginal discharge.     Musculoskeletal symptoms:  Back pain.   Neurologic symptoms:  No headache, no dizziness.    Allergy/immunologic symptoms:  No recurrent infections,       Health Status   Allergies:    Allergic Reactions (Selected)  NKA.      Physical Examination               Vital Signs   Vital Signs   7/23/2019 15:09          Temperature Tympanic      97.8 F                             Peripheral Pulse Rate     87 bpm                             Respiratory Rate          14 br/min                             Systolic Blood Pressure   133 mmHg                             Diastolic Blood Pressure  87 mmHg                             Mean Arterial Pressure    102 mmHg                             Oxygen Saturation         96 %  .   Measurements   7/23/2019 15:09          Height                    0 cm                             Weight                    73.05 kg  .   General:  Alert, NO toxic appearence.    Skin:  Warm, dry, normal for ethnicity.    Head:  Atraumatic.   Neck:  Supple.   Eye:  Normal conjunctiva, sclera is not icteric.    Ears, nose, mouth and throat:  Oral mucosa moist.   Cardiovascular:  Normal peripheral perfusion, No edema.    Respiratory:  Respirations are non-labored, no retraction or rapid breathing. No increase work of breathing.  , pulse ox 96 percent. normal and not hypoxic.    Chest wall:  No deformity.   Back:  No CV tenderness, Right lower back pain without any midline vertebral tenderness.  No weakness to extremities.  Bending movement twisting makes symptoms worse.  No weakness to extremities.  No CVA tenderness.    Musculoskeletal:  Normal ROM, no swelling.    Gastrointestinal:  Soft, Nontender, Non distended, Normal bowel sounds.    Neurological:  Alert and oriented to person, place, time, and situation, No focal neurological deficit observed.    Psychiatric:  Appropriate mood & affect.      Medical Decision Making   Notes:  Hx and exam as above. Back pain appear to be lumbar strain and no saddle  difficulty with concentration.     Integumentary   Integumentary No deficits were identified   Posture   Posture Forward head position;Protracted shoulders   Range of Motion (ROM)   ROM Comment Cervical ROM WNL, Cervical SB provokes dizziness when performed quickly bilaterally.     Strength   Manual Muscle Testing Quick Adds MMT: Hip;MMT: Knee   MMT: Hip, Rehab Eval   Hip Flexion - Left Side (4/5) good, left   Hip Extension - Left Side (4/5) good, left   Hip ABduction - Left Side (4/5) good, left   Hip ADduction - Left Side (5/5) normal,left   Hip Internal Rotation - Left Side (4/5) good, left   Hip External Rotation - Left Side (4/5) good, left   Hip Flexion - Right Side (4/5) good, right   Hip Extension - Right Side (4/5) good, right   Hip ABduction - Right Side (4/5) good, right   Hip ADduction - Right Side (5/5) normal,right   Hip Internal Rotation - Right Side (4/5) good, right   Hip External Rotation - Right Side (4/5) good, right   MMT: Knee, Rehab Eval   Knee Flexion - Left Side (4/5) good, left   Knee Extension - Left Side (4/5) good, left   Knee Flexion - Right Side (4/5) good, right   Knee Extension - Right Side (4/5) good, right   Bed Mobility   Bed Mobility Comments independent   Transfer Skills   Transfer Comments independent with transfers   Locomotion   Wheel Chair Mobility Comments Daughter pushing patient in wheelchair.   Gait   Gait Gait Skill;Gait Analysis   Gait Skills   Level of [place]: Gait stand-by assist   Physical Assist/Nonphysical Assist: Gait supervision   Weight-Bearing Restrictions: Gait full weight-bearing   Assistive Device for Transfer: Gait rolling walker   Gait Distance 50 feet   Gait Analysis   Gait Pattern Used 4-point gait   Gait Deviations Noted decreased stride length   Impairments Contributing to Gait Deviations impaired balance;decreased strength   Balance   Balance Comments Guzman/56.     Sensory Examination   Sensory Perception no deficits were identified    Coordination   Coordination no deficits were identified   Muscle Tone   Muscle Tone no deficits were identified   Cervicogenic Screen   Neck ROM Cervical SB limited to 25 degrees B, cervical rotation to 65 degrees, cervical flexion 40 degrees, extension 35 degrees   Vertebral Artery Test Normal   Neck Torsion Test (head still, body rotating) Negative   Neck Torsion Test (head and body rotating) Negative   Oculomotor Exam   Smooth Pursuit Normal   Saccades Abnormal   Saccades Comments dizzy, nausea with horizontal, negative with vertical   VOR Abnormal   VOR Comments : horizontal dizziness and nausea noted, slight nystagmus.  Negative for vertical.     VOR Cancellation Normal   Rapid Head Thrust Normal   Convergence Testing Abnormal   Convergence Testing Comments 5 cm convergence, 15 cm accomodation when taking pen away from nose.   Planned Therapy Interventions   Planned Therapy Interventions balance training;gait training;neuromuscular re-education;ROM;strengthening;stretching;manual therapy   Clinical Impression   Criteria for Skilled Therapeutic Interventions Met yes, treatment indicated   PT Diagnosis Generalized weakness.  Gait instability   Influenced by the following impairments Weakness, impaired gait, impaired balance, dizziness.    Functional limitations due to impairments Walking, standing, stairs, turning head.   Clinical Presentation Evolving/Changing   Clinical Presentation Rationale Clinical judgement   Clinical Decision Making (Complexity) Moderate complexity   Therapy Frequency 2 times/Week   Predicted Duration of Therapy Intervention (days/wks) 8 weeks   Risk & Benefits of therapy have been explained Yes   Patient, Family & other staff in agreement with plan of care Yes   Clinical Impression Comments Pt is a 66 y.o. female who presented to PT with symptoms of generalized weakness, imbalance, and vertigo secondary to recent hospitalization due to COVID pneumonia and pulmonary emboli.  Pt will  paresthesia or fevers or weakness. Gait safe and stable. No fall or trauma. No fevers. No abd or urinary symptoms. Plan supportive care with activity restrictions. Pt advise to f/u with  primary care with return precautions   .      Impression and Plan   Diagnosis   Right lumbar strain     Plan   Condition: Improved, Stable.    Disposition: Discharged: Time  7/23/2019 15:22:00, to home.    Prescriptions: Launch prescriptions   Pharmacy:  Ultram 50 mg oral tablet (Prescribe): 50 mg, 1 tab(s), PO, q6hr, [id number]  Disp Fifteen   Dr Yang, PRN: for pain, 12 tab, 0 Refill(s)  Flexeril 10 mg oral tablet (Prescribe): 10 mg, 1 tab(s), PO, TID, PRN: for spasm, 20 tab, 0 Refill(s)  naproxen 500 mg oral tablet (Prescribe): 500 mg, 1 tab(s), PO, BID, with food  Dr yang, PRN: for pain, 20 tab(s), 0 Refill(s).    Patient was given the following educational materials: Muscle Strain.    Follow up with: Follow up with primary care provider Follow up with primary care provider    naproxen with food  flexeril as needed  Ultram for pain   avoid painful movements  reutrn for changes or worse or concenr  Heat to back 20 mins 6 times per day  avoid strenous activity but do not do nothing as you will get stiff   .        ED Time Seen By Provider Entered On:  7/23/2019 15:09     Performed On:  7/23/2019 15:09  by Beatriz Rice               Time Seen By Provider   Time Seen by Provider :   7/23/2019 15:09    Beatriz Rice - 7/23/2019 15:09           VITALS INFORMATION  Vitals/Ht/Wt  Temperature Tympanic:  97.8 F  Peripheral Pulse Rate:  87 bpm  Respiratory Rate:  14 br/min  Oxygen Saturation:  96 %  Oxygen Therapy:  Room air  Systolic Blood Pressure:  133 mmHg  Diastolic Blood Pressure:  87 mmHg  Mean Arterial Pressure:  102 mmHg  Height:  0 cm  Weight:  73.05 kg       MEDICAL INFORMATION   Allergy Info:          NKA             Prescriptions:                  Prescription Display   cyclobenzaprine (Flexeril 10 mg oral  benefit from skilled PT to improve balance, strength, activity tolerance, and to help manage vertigo symptoms.   Education Assessment   Preferred Learning Style Listening;Demonstration;Pictures/video   Barriers to Learning No barriers   GOALS   PT Eval Goals 1;2;3   Goal 1   Goal Identifier Guzman   Goal Description Pt will demonstrate Guzman score of 48/56 or better in order to demonstrate significant improvement of balance and reduction of falls risk.   Target Date 04/14/22   Goal 2   Goal Identifier Dizziness   Goal Description Pt will report no more than her pre-COVID baseline dizziness in order to resume ADL's and mobility at prior level of function.   Target Date 04/14/22   Goal 3   Goal Identifier Activity tolerance   Goal Description Pt will demonstrate ability to perform ambulation for 10 minutes with no increase in dyspnea in order to establish a walking program to maintain a healthy lifestyle.   Target Date 04/14/22   Total Evaluation Time   PT Eval, Moderate Complexity Minutes (31822) 25      tablet) 10 mg = 1 tab(s), PO, TID, PRN for spasm, # 20 tab, 0 Refill(s)   naproxen (naproxen 500 mg oral tablet) 500 mg = 1 tab(s), PO, BID, PRN for pain, with food  Dr yang, # 20 tab(s), 0 Refill(s)   traMADol (Ultram 50 mg oral tablet) 50 mg = 1 tab(s), PO, q6hr, PRN for pain, [id number]  Disp Fifteen   Dr Yang, # 12 tab, 0 Refill(s)          DISCHARGE INFORMATION   Discharge Disposition: Home - (i.e. Home on oxygen, DME)- 01     PATIENT EDUCATION INFORMATION   Instructions:       Muscle Strain   Follow up:                  With: Address: When:   Follow up with primary care provider     Comments:   Follow up with primary care provider    naproxen with food   flexeril as needed   Ultram for pain   avoid painful movements   reutrn for changes or worse or concenr   Heat to back 20 mins 6 times per day   avoid strenous activity but do not do nothing as you will get stiff               DIAGNOSIS

## 2022-02-21 NOTE — PROGRESS NOTES
Clinic Care Coordination Contact    Follow Up Progress Note      Assessment: Patient reports she is a little better  Patient can be off the oxygen for 5 minutes  Using oxygen at 1.5 liters  Denies increased SOB episodes or cough .  Oximeter reading 90-95%  Legs continue to be weak and so she is pacing activity   Blood sugar  now that she is on the Metformin   Appetitie good weight 152 and no ankle edema   Care Gaps:    Health Maintenance Due   Topic Date Due     DEXA  Never done     DIABETIC FOOT EXAM  Never done     EYE EXAM  Never done     COVID-19 Vaccine (1) Never done     ZOSTER IMMUNIZATION (1 of 2) Never done     ADVANCE CARE PLANNING  09/09/2020     MAMMO SCREENING  09/11/2020     MEDICARE ANNUAL WELLNESS VISIT  07/02/2021     FALL RISK ASSESSMENT  Never done     Pneumococcal Vaccine: Pediatrics (0 to 5 Years) and At-Risk Patients (6 to 64 Years) (1 of 1 - PPSV23) Never done     Pneumococcal Vaccine: 65+ Years (1 of 1 - PPSV23) Never done     DTAP/TDAP/TD IMMUNIZATION (2 - Td or Tdap) 07/15/2021     INFLUENZA VACCINE (1) Never done     COLORECTAL CANCER SCREENING  09/26/2021     PHQ-2  01/01/2022       Will discuss at PCP visit 3/4/2022    Goals addressed this encounter:   Goals Addressed                    This Visit's Progress       Medical (pt-stated)   60%      Goal Statement: I will increase my strength and breathe better in the next month   Date Goal set: 2/8/2022  Barriers: Deconditioned   Strengths: Daughter is staying for 2 weeks   Date to Achieve By: 3/8/2022  Patient expressed understanding of goal: Yes  Action steps to achieve this goal:  1. I will use oxygen at 1.5 litters at night and with activity   2. I will keep my oxygen level above 90%  I will call with increased shortness of breath episodes,fever or coughing up a colored sputum   3. I will check my weight daily and call if weight gain is 2-3# in a day or 5# in a week   4. I will rest,drink plenty of water ,cough and deep breath   5.  I will participate with the Get Well Loop             Intervention/Education provided during outreach: Patient will call if experiencing increased symptoms of concern      Outreach Frequency: 2 weeks    Plan:   1. Patient will continue oxygen continuously at 1.5 liter   2. Patient will discuss with PCP if she is appropriate for post COVID clinic   3. Care Coordinator will follow up after March 4 PCP visit     [facility]   Nola Nugent RN, Care Coordinator   [facility]'s   E-mail [email or web address]   [phone]

## 2022-02-22 ENCOUNTER — HOSPITAL ENCOUNTER (OUTPATIENT)
Dept: PHYSICAL THERAPY | Facility: CLINIC | Age: 67
Setting detail: THERAPIES SERIES
End: 2022-02-22
Attending: FAMILY MEDICINE
Payer: COMMERCIAL

## 2022-02-22 PROCEDURE — 97110 THERAPEUTIC EXERCISES: CPT | Mod: GP | Performed by: PHYSICAL THERAPIST

## 2022-02-22 PROCEDURE — 97530 THERAPEUTIC ACTIVITIES: CPT | Mod: GP | Performed by: PHYSICAL THERAPIST

## 2022-02-22 PROCEDURE — 97112 NEUROMUSCULAR REEDUCATION: CPT | Mod: GP | Performed by: PHYSICAL THERAPIST

## 2022-02-25 ENCOUNTER — HOSPITAL ENCOUNTER (OUTPATIENT)
Dept: PHYSICAL THERAPY | Facility: CLINIC | Age: 67
Setting detail: THERAPIES SERIES
End: 2022-02-25
Attending: FAMILY MEDICINE
Payer: COMMERCIAL

## 2022-02-25 PROCEDURE — 97112 NEUROMUSCULAR REEDUCATION: CPT | Mod: GP | Performed by: PHYSICAL THERAPIST

## 2022-02-25 PROCEDURE — 97110 THERAPEUTIC EXERCISES: CPT | Mod: GP | Performed by: PHYSICAL THERAPIST

## 2022-02-25 PROCEDURE — 97530 THERAPEUTIC ACTIVITIES: CPT | Mod: GP | Performed by: PHYSICAL THERAPIST

## 2022-02-27 DIAGNOSIS — R42 VERTIGO: ICD-10-CM

## 2022-02-28 NOTE — TELEPHONE ENCOUNTER
Oxazepam      Last Written Prescription Date:  12/1/2021  Last Fill Quantity: 40,   # refills: 1  Last Office Visit: 2/14/2022  Future Office visit:       Routing refill request to provider for review/approval because:  Drug not on the [facility], P or [facility] refill protocol or controlled substance

## 2022-03-01 RX ORDER — OXAZEPAM 10 MG
CAPSULE ORAL
Qty: 40 CAPSULE | Refills: 1 | Status: SHIPPED | OUTPATIENT
Start: 2022-03-01 | End: 2022-05-05

## 2022-03-04 ENCOUNTER — TELEPHONE (OUTPATIENT)
Dept: FAMILY MEDICINE | Facility: CLINIC | Age: 67
End: 2022-03-04

## 2022-03-04 ENCOUNTER — OFFICE VISIT (OUTPATIENT)
Dept: FAMILY MEDICINE | Facility: CLINIC | Age: 67
End: 2022-03-04
Payer: COMMERCIAL

## 2022-03-04 VITALS
OXYGEN SATURATION: 97 % | BODY MASS INDEX: 28.53 KG/M2 | DIASTOLIC BLOOD PRESSURE: 76 MMHG | WEIGHT: 156 LBS | HEART RATE: 89 BPM | SYSTOLIC BLOOD PRESSURE: 124 MMHG | TEMPERATURE: 98.5 F

## 2022-03-04 DIAGNOSIS — E63.9 NUTRITIONAL DEFICIENCY: ICD-10-CM

## 2022-03-04 DIAGNOSIS — E27.9 ADRENAL ABNORMALITY (H): ICD-10-CM

## 2022-03-04 DIAGNOSIS — J12.82 PNEUMONIA DUE TO 2019 NOVEL CORONAVIRUS: Primary | ICD-10-CM

## 2022-03-04 DIAGNOSIS — I42.9 CARDIOMYOPATHY, UNSPECIFIED TYPE (H): ICD-10-CM

## 2022-03-04 DIAGNOSIS — I26.94 MULTIPLE SUBSEGMENTAL PULMONARY EMBOLI WITHOUT ACUTE COR PULMONALE (H): ICD-10-CM

## 2022-03-04 DIAGNOSIS — U07.1 PNEUMONIA DUE TO 2019 NOVEL CORONAVIRUS: Primary | ICD-10-CM

## 2022-03-04 DIAGNOSIS — E11.65 TYPE 2 DIABETES MELLITUS WITH HYPERGLYCEMIA, WITHOUT LONG-TERM CURRENT USE OF INSULIN (H): ICD-10-CM

## 2022-03-04 DIAGNOSIS — E78.5 HYPERLIPIDEMIA LDL GOAL <130: ICD-10-CM

## 2022-03-04 PROCEDURE — 36415 COLL VENOUS BLD VENIPUNCTURE: CPT | Performed by: FAMILY MEDICINE

## 2022-03-04 PROCEDURE — 99214 OFFICE O/P EST MOD 30 MIN: CPT | Performed by: FAMILY MEDICINE

## 2022-03-04 PROCEDURE — 82306 VITAMIN D 25 HYDROXY: CPT | Performed by: FAMILY MEDICINE

## 2022-03-04 NOTE — TELEPHONE ENCOUNTER
Appointment Already made.   Jessica Klande, MA Schoen, Gregory G, MD  P [facility]  Schedule follow up on 4/8/22 with me.

## 2022-03-04 NOTE — PROGRESS NOTES
Assessment & Plan     Pneumonia due to 2019 novel coronavirus  Clinically appears improved and O2 sats are stable with oxygen yet at 2 LPM.  She does have some pulmonary rehab coming up.  Continue oxygen as at present and in a week can reassess     Multiple subsegmental pulmonary emboli without acute cor pulmonale (H)  Remains on DOAC therapy with plan to repeat CT chest with PE protocol when 3 months out from diagnosis to determine safety in discontinuing anticoagulation.     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     Cardiomyopathy, unspecified type (H)  Clincally stable at present. F/U echocardiogram 3 months out from onset.  Did give patient the okay to slowly increase her activity as breathing would tolerate.     Type 2 diabetes mellitus with hyperglycemia, without long-term current use of insulin (H)  Doing well on Metformin, no change at this time.     Hyperlipidemia LDL goal <130  Due to liver disease, will hold off on treating with statins until seen by hepatology and cleared.     Fatty Liver Infiltration   This apparently was present at a time when she was still consuming alcohol daily.  Will have her see GI and also will get another look at the liver on her 3 month repeat CT chest.     Nutritional deficiency  Has concerns for Vit D deficiency and would like labs checked: - Vitamin D Deficiency    Gregory G. Schoen, MD  [facility]          Molly Quinones is a 66 year old who presents for the following health issues accompanied by her spouse.   HPI     Here for follow up of Covid pneumonia, bilateral pulmonary emboli, fatty liver change with abnormal LFTs, new onset type 2 diabetes and bilateral adrenal thickening as an incidental finding.  Since last visit, she reports she is getting stronger, but is  still relying on oxygen. Runs up to 2 liters/min per NC. She can get around the house now and felt able to come into the clinic. With oxygen, she is able to maintain sats into the upper 90s.     New onset diabetes: sugars  range, better since being on metformin (checking AM only). She has not had symptoms of hypoglycemia.      Review of Systems   Const: no fevers, chills  HEENT: neg  RESP: as above, oxygen dependent but improving  CVR: no chest pains, palpitations.  Does have WARNER but presumed moreso due to her PE and COVID status.  She did have a reduced EF presumably related to COVID and expected to recover with plan to repeat at about 90 days post onset of infection.       Objective    /76   Pulse 89   Temp 98.5  F (36.9  C) (Temporal)   Wt 70.8 kg (156 lb)   LMP 05/13/2004   SpO2 97%   BMI 28.53 kg/m    Body mass index is 28.53 kg/m .  Physical Exam   Alert and oriented, in no acute distress.  Oropharynx is clear.   Lungs are clear to auscultation with good excursion, heart is regular without murmurs, clicks or rubs.   Skin without lesions.

## 2022-03-06 PROBLEM — U07.1: Status: RESOLVED | Noted: 2022-01-26 | Resolved: 2022-03-06

## 2022-03-06 PROBLEM — D72.810: Status: RESOLVED | Noted: 2022-01-26 | Resolved: 2022-03-06

## 2022-03-06 LAB — DEPRECATED CALCIDIOL+CALCIFEROL SERPL-MC: 25 UG/L (ref 20–75)

## 2022-03-07 ENCOUNTER — HOSPITAL ENCOUNTER (OUTPATIENT)
Dept: PHYSICAL THERAPY | Facility: CLINIC | Age: 67
Setting detail: THERAPIES SERIES
End: 2022-03-07
Attending: FAMILY MEDICINE
Payer: COMMERCIAL

## 2022-03-07 DIAGNOSIS — U07.1 PNEUMONIA DUE TO 2019 NOVEL CORONAVIRUS: ICD-10-CM

## 2022-03-07 DIAGNOSIS — J12.82 PNEUMONIA DUE TO 2019 NOVEL CORONAVIRUS: ICD-10-CM

## 2022-03-07 PROCEDURE — 97110 THERAPEUTIC EXERCISES: CPT | Mod: GP | Performed by: PHYSICAL THERAPIST

## 2022-03-08 RX ORDER — RIVAROXABAN 20 MG/1
TABLET, FILM COATED ORAL
Qty: 30 TABLET | Refills: 0 | Status: SHIPPED | OUTPATIENT
Start: 2022-03-08 | End: 2022-04-11

## 2022-03-08 NOTE — TELEPHONE ENCOUNTER
Routing refill request to provider for review/approval because:  Labs out of range:  Creatinine Clearance  Patient needs to be seen because:  6 month visit due      Zoya Nguyen RN

## 2022-03-09 ENCOUNTER — HOSPITAL ENCOUNTER (OUTPATIENT)
Dept: CARDIAC REHAB | Facility: CLINIC | Age: 67
End: 2022-03-09
Attending: FAMILY MEDICINE
Payer: COMMERCIAL

## 2022-03-09 DIAGNOSIS — J12.82 PNEUMONIA DUE TO 2019 NOVEL CORONAVIRUS: ICD-10-CM

## 2022-03-09 DIAGNOSIS — U07.1 PNEUMONIA DUE TO 2019 NOVEL CORONAVIRUS: ICD-10-CM

## 2022-03-09 PROCEDURE — 94626 PHY/QHP OP PULM RHB W/MNTR: CPT | Performed by: REHABILITATION PRACTITIONER

## 2022-03-10 ENCOUNTER — HOSPITAL ENCOUNTER (OUTPATIENT)
Dept: PHYSICAL THERAPY | Facility: CLINIC | Age: 67
Setting detail: THERAPIES SERIES
Discharge: HOME | End: 2022-03-10
Attending: FAMILY MEDICINE
Payer: COMMERCIAL

## 2022-03-10 PROCEDURE — 97110 THERAPEUTIC EXERCISES: CPT | Mod: GP | Performed by: PHYSICAL THERAPIST

## 2022-03-10 PROCEDURE — 97112 NEUROMUSCULAR REEDUCATION: CPT | Mod: GP | Performed by: PHYSICAL THERAPIST

## 2022-03-11 ENCOUNTER — VIRTUAL VISIT (OUTPATIENT)
Dept: EDUCATION SERVICES | Facility: CLINIC | Age: 67
End: 2022-03-11
Payer: COMMERCIAL

## 2022-03-11 ENCOUNTER — PATIENT OUTREACH (OUTPATIENT)
Dept: NURSING | Facility: CLINIC | Age: 67
End: 2022-03-11
Payer: COMMERCIAL

## 2022-03-11 DIAGNOSIS — J12.82 PNEUMONIA DUE TO 2019 NOVEL CORONAVIRUS: Primary | ICD-10-CM

## 2022-03-11 DIAGNOSIS — U07.1 PNEUMONIA DUE TO 2019 NOVEL CORONAVIRUS: Primary | ICD-10-CM

## 2022-03-11 DIAGNOSIS — E11.65 TYPE 2 DIABETES MELLITUS WITH HYPERGLYCEMIA, WITHOUT LONG-TERM CURRENT USE OF INSULIN (H): ICD-10-CM

## 2022-03-11 PROCEDURE — G0108 DIAB MANAGE TRN  PER INDIV: HCPCS

## 2022-03-11 ASSESSMENT — ACTIVITIES OF DAILY LIVING (ADL): DEPENDENT_IADLS:: INDEPENDENT

## 2022-03-11 NOTE — PROGRESS NOTES
Diabetes Self-Management Education & Support    Presents for: Initial Assessment for new diagnosis    Type of Visit: Telephone Visit  11:02am-11:46am    How would patient like to obtain AVS? Gianni    ASSESSMENT:  Met with Stacie today for new diagnosis of diabetes.  Stacie was hospitalized recently and it was then she was diagnosed with diabetes.  She practices intermittent fasting- has been following for about a year.  She feels like this diet is beneficial for her and does not crave carbohydrates and feels this has reset her metabolism.   Tracks intake and calories with [employer] latosha and eats about 1000 calories per day.  Does not drink pop or eat a lot of juice.  Says she feels comfortable here in this calorie range, but has been recovering from hospitalization and is more sedentary.  We discussed how this was on the lower side and that when she begins to be more active she will need more calories to support the activity.  Stacie has lost weight since hospital stay.    She has a Meter- Accu chek.  Is using phone to track BG.  Tests BG once per day- usually in the morning/fasting.  BG ranges from  mg/dL.  Today's was 88 mg/dL     When feeling well- rides stationary bike on occasion or goes for walks- maybe twice per week    Stacie feels that she has a good grasp on what to do.  Will reach out and schedule if she has further questions or concerns.        Patient's most recent   Lab Results   Component Value Date    A1C 6.5 01/27/2022    A1C 5.9 04/20/2021    is meeting goal of <7.0    Diabetes knowledge and skills assessment:   Patient is knowledgeable in diabetes management concepts related to: Being Active and Monitoring    Continue education with the following diabetes management concepts: Healthy Eating, Reducing Risks and Healthy Coping    Based on learning assessment above, most appropriate setting for further diabetes education would be: Individual setting.    INTERVENTIONS:    Education provided  today on:  AADE Self-Care Behaviors:  Healthy Eating: consistency in amount, composition, and timing of food intake and weight reduction  Being Active: relationship to blood glucose and describe appropriate activity program  Monitoring: purpose, log and interpret results and frequency of monitoring  Reducing Risks: prevention, early diagnostic measures and treatment of complications and A1C - goals, relating to blood glucose levels, how often to check  Healthy Coping: benefits of making appropriate lifestyle changes    Opportunities for ongoing education and support in diabetes-self management were discussed. Pt verbalized understanding of concepts discussed and recommendations provided today.       Education Materials Provided:  BG Log Sheet, My Plate Planner and 1500 calorie meal planning          PLAN    1) Increase physical activity as able.    Goal 150 minutes per week of physical activity.    2) Include carbohydrates with each meal 3 servings (45 grams) per meal and 1-2 servings per snack (15-30 grams)    3) Continue monitoring BG at least once per day.  When you have enough data for fasting, can move to after meal testing.   Goals:    -Fastin 130 mg/dL    -2 hours after the start of a meal: <180 mg/dL    Topics to cover at upcoming visits: Healthy Eating, Being Active, Problem Solving, Reducing Risks and Healthy Coping  Follow-up: Stacie declined scheduling a follow up at this time.  She will reach out when future questions arise.    See Goals Section for co-developed, patient-stated behavior change goals.  AVS provided to patient today.      SUBJECTIVE / OBJECTIVE:  Presents for: Initial Assessment for new diagnosis  Accompanied by: Self  Diabetes education in the past 24mo: No  Focus of Visit: Monitoring, Healthy Eating  Diabetes type: Type 2  Date of diagnosis: 2022  How confident are you filling out medical forms by yourself:: Not Assessed  Diabetes management related comments/concerns: what  "to eat, diet  Difficulty affording diabetes medication?: No  Difficulty affording diabetes testing supplies?: Yes  Other concerns:: None  Cultural Influences/Ethnic Background:  Choose not to answer      Diabetes Symptoms & Complications:  Fatigue: No  Neuropathy: No  Polydipsia: No  Polyphagia: No  Polyuria: No  Visual change: No  Slow healing wounds: No  Symptom course: Improving  Weight trend: Stable  Complications assessed today?: No    Patient Problem List and Family Medical History reviewed for relevant medical history, current medical status, and diabetes risk factors.    Vitals:  LMP 05/13/2004   Estimated body mass index is 28.53 kg/m  as calculated from the following:    Height as of 1/26/22: 1.575 m (5' 2\").    Weight as of 3/4/22: 70.8 kg (156 lb).   Last 3 BP:   BP Readings from Last 3 Encounters:   03/04/22 124/76   02/07/22 125/69   10/16/21 121/82       History   Smoking Status     Former Smoker     Packs/day: 0.00     Years: 0.00   Smokeless Tobacco     Never Used     Comment: 1980-quit       Labs:  Lab Results   Component Value Date    A1C 6.5 01/27/2022    A1C 5.9 04/20/2021     Lab Results   Component Value Date     02/07/2022    GLC 98 02/07/2022     03/25/2021     Lab Results   Component Value Date     03/25/2021     HDL Cholesterol   Date Value Ref Range Status   03/25/2021 42 (L) >49 mg/dL Final   ]  GFR Estimate   Date Value Ref Range Status   02/07/2022 >90 >60 mL/min/1.73m2 Final     Comment:     Effective December 21, 2021 eGFRcr in adults is calculated using the 2021 CKD-EPI creatinine equation which includes age and gender (Jonh anton al., NEJM, DOI: 10.1056/CVNVkw9156208)   03/25/2021 >90 >60 mL/min/[1.73_m2] Final     Comment:     Non  GFR Calc  Starting 12/18/2018, serum creatinine based estimated GFR (eGFR) will be   calculated using the Chronic Kidney Disease Epidemiology Collaboration   (CKD-EPI) equation.       GFR Estimate If Black   Date Value " Ref Range Status   03/25/2021 >90 >60 mL/min/[1.73_m2] Final     Comment:      GFR Calc  Starting 12/18/2018, serum creatinine based estimated GFR (eGFR) will be   calculated using the Chronic Kidney Disease Epidemiology Collaboration   (CKD-EPI) equation.       Lab Results   Component Value Date    CR 0.63 02/07/2022    CR 0.67 03/25/2021     No results found for: MICROALBUMIN    Healthy Eating:  Healthy Eating Assessed Today: Yes  Meals include: Breakfast, Lunch, Dinner  Breakfast: 9-10am egg and toast (ezekial bread), butter with grapefruit, coffee or tea  Lunch: sometimes if hungry- has soup broth based  Dinner: sauteed vegetables, some type of meat or fish  Snacks: protein  Beverages: Tea, Coffee, Water  Has patient met with a dietitian in the past?: Yes ([facility] nutrition advise)    Being Active:  Being Active Assessed Today: Yes  Exercise:: Unable to exercise (recooperating from hospitalization)    Monitoring:  Monitoring Assessed Today: Yes  Did patient bring glucose meter to appointment? : No  Blood Glucose Meter: Accu-chek  Times checking blood sugar at home (number): 1  Times checking blood sugar at home (per): Day  Blood glucose trend: Fluctuating    Glucose data:  She has a Meter- Accu chek.  Is using phone to track BG.  Tests BG once per day- usually in the morning/fasting.  BG ranges from  mg/dL.  Today's was 88 mg/dL       Taking Medications:  Diabetes Medication(s)     Biguanides       metFORMIN (GLUCOPHAGE) 500 MG tablet    Take 1 tablet (500 mg) by mouth 2 times daily (with meals)          Taking Medication Assessed Today: Yes  Current Treatments: Diet, Oral Medication (taken by mouth)  Problems taking diabetes medications regularly?: Yes  Diabetes medication side effects?: No    Problem Solving:  Is the patient at risk for hypoglycemia?: No  Is the patient at risk for DKA?: No  Does patient have severe weather/disaster plan for diabetes management?: No  Does patient have sick  day plan for diabetes management?: No        Reducing Risks:  Reducing Risks Assessed Today: No  Diabetes Risks: Age over 45 years, Family History, Hyperlipidemia  CAD Risks: Diabetes Mellitus, Family history  Has dilated eye exam at least once a year?: Yes  Sees dentist every 6 months?: Yes  Feet checked by healthcare provider in the last year?: No    Healthy Coping:  Emotional response to diabetes: Ready to learn  Informal Support system:: Children  Stage of change: ACTION (Actively working towards change)  Patient Activation Measure Survey Score:  AILEEN Score (Last Two) 7/15/2011   AILEEN Raw Score 46   Activation Score 75.3   AILEEN Level 4             Amanda Zacarias RDN, LD  Outpatient Diabetes Education  Adult Diabetes Education Triage [phone]    Time Spent: 44 minutes  Encounter Type: Individual        Any diabetes medication dose changes were made via the Certified Diabetes Care & Education Protocol in collaboration with the patient's referring provider. A copy of this encounter was shared with the provider.

## 2022-03-11 NOTE — PATIENT INSTRUCTIONS
1) Increase physical activity as able.    Goal 150 minutes per week of physical activity.    2) Include carbohydrates with each meal 3 servings (45 grams) per meal and 1-2 servings per snack (15-30 grams)    3) Continue monitoring BG at least once per day.  When you have enough data for fasting, can move to after meal testing.   Goals:    -Fastin 130 mg/dL    -2 hours after the start of a meal: <180 mg/dL

## 2022-03-11 NOTE — PROGRESS NOTES
Clinic Care Coordination Contact    Follow Up Progress Note   [facility]  Hospitalist Discharge Summary       Date of Admission:  1/26/2022  Date of Discharge:  2/7/2022  Discharge Diagnoses     Principal Problem:    Pneumonia due to 2019 novel coronavirus     Assessment:   Patient continue Pulmonary Rehabilitation  Patient was able to be off the oxygen for 6 minutes  Using Oxygen at 1.5 liter continuously   Oximeter reading while at rest 95-96% and 90% with increased activity  Blood sugar 88-90   Weight stable at 152 and no ankle edema     Care Gaps:    Health Maintenance Due   Topic Date Due     DEXA  Never done     DIABETIC FOOT EXAM  Never done     EYE EXAM  Never done     COVID-19 Vaccine (1) Never done     ZOSTER IMMUNIZATION (1 of 2) Never done     ADVANCE CARE PLANNING  09/09/2020     MAMMO SCREENING  09/11/2020     MEDICARE ANNUAL WELLNESS VISIT  07/02/2021     FALL RISK ASSESSMENT  Never done     Pneumococcal Vaccine: Pediatrics (0 to 5 Years) and At-Risk Patients (6 to 64 Years) (1 of 1 - PPSV23) Never done     Pneumococcal Vaccine: 65+ Years (1 of 1 - PPSV23) Never done     DTAP/TDAP/TD IMMUNIZATION (2 - Td or Tdap) 07/15/2021     INFLUENZA VACCINE (1) Never done     COLORECTAL CANCER SCREENING  09/26/2021     PHQ-2 (once per calendar year)  01/01/2022     LIPID  03/25/2022     MICROALBUMIN  03/25/2022       Care Gap Goal set: Yes    Goals addressed this encounter:   Goals Addressed                    This Visit's Progress       Medical (pt-stated)   70%      Goal Statement: I will increase my strength and breathe better in the next 1-2 months  Date Goal set: 2/8/2022  Barriers: Deconditioned   Strengths: Daughter is staying for 2 weeks   Date to Achieve By: 5/8/2022  Patient expressed understanding of goal: Yes  Action steps to achieve this goal:  1. I will use oxygen at 1.5 litters at night and with activity   2. I will keep my oxygen level above 90%  I will call with  increased shortness of breath episodes,fever or coughing up a colored sputum   3. I will check my weight daily and call if weight gain is 2-3# in a day or 5# in a week   4. I will rest,drink plenty of water ,cough and deep breath   5. I will participate with the Get Well Loop          Medical (pt-stated)   0%      Goal Statement: I will complete Health Maintenance due (Patient refuses vaccines due to past side effects)  Health Maintenance Due   Topic Date Due     DEXA  Never done     DIABETIC FOOT EXAM  Never done     EYE EXAM  Never done     COVID-19 Vaccine (1) Never done     ZOSTER IMMUNIZATION (1 of 2) Never done     ADVANCE CARE PLANNING  09/09/2020     MAMMO SCREENING  09/11/2020     MEDICARE ANNUAL WELLNESS VISIT  07/02/2021     FALL RISK ASSESSMENT  Never done     Pneumococcal Vaccine: Pediatrics (0 to 5 Years) and At-Risk Patients (6 to 64 Years) (1 of 1 - PPSV23) Never done     Pneumococcal Vaccine: 65+ Years (1 of 1 - PPSV23) Never done     DTAP/TDAP/TD IMMUNIZATION (2 - Td or Tdap) 07/15/2021     INFLUENZA VACCINE (1) Never done     COLORECTAL CANCER SCREENING  09/26/2021     PHQ-2 (once per calendar year)  01/01/2022     LIPID  03/25/2022     MICROALBUMIN  03/25/2022      Date Goal set: 3/11/2022  Barriers: None identified   Strengths:agrees with the plan   Date to Achieve By: 5/11/2022  Patient expressed understanding of goal: Yes  Action steps to achieve this goal:  1. I will discuss at next provider visit             Intervention/Education provided during outreach: Not discussed today      Outreach Frequency: 2 weeks    Plan:   Patient will continue Pulmonary Rehabilitation   Patient will use oxygen at 1.5 liters and will continue to check Oximeter   Care Coordinator will follow up in 1-2 weeks   [facility]   Nola Nugent RN, Care Coordinator   [facility]'s   E-mail [email or web address]   [phone]

## 2022-03-11 NOTE — LETTER
3/11/2022         RE: Stacie Hair  [address]  [place] [address]        Dear Colleague,    Thank you for referring your patient, Stacie Hair, to the [facility] [place]. Please see a copy of my visit note below.    Diabetes Self-Management Education & Support    Presents for: Initial Assessment for new diagnosis    Type of Visit: Telephone Visit  11:02am-11:46am    How would patient like to obtain AVS? MyChart    ASSESSMENT:  Met with Stacie today for new diagnosis of diabetes.  Stacie was hospitalized recently and it was then she was diagnosed with diabetes.  She practices intermittent fasting- has been following for about a year.  She feels like this diet is beneficial for her and does not crave carbohydrates and feels this has reset her metabolism.   Tracks intake and calories with [employer] latosha and eats about 1000 calories per day.  Does not drink pop or eat a lot of juice.  Says she feels comfortable here in this calorie range, but has been recovering from hospitalization and is more sedentary.  We discussed how this was on the lower side and that when she begins to be more active she will need more calories to support the activity.  Stacie has lost weight since hospital stay.    She has a Meter- Accu chek.  Is using phone to track BG.  Tests BG once per day- usually in the morning/fasting.  BG ranges from  mg/dL.  Today's was 88 mg/dL     When feeling well- rides stationary bike on occasion or goes for walks- maybe twice per week    Stacie feels that she has a good grasp on what to do.  Will reach out and schedule if she has further questions or concerns.        Patient's most recent   Lab Results   Component Value Date    A1C 6.5 01/27/2022    A1C 5.9 04/20/2021    is meeting goal of <7.0    Diabetes knowledge and skills assessment:   Patient is knowledgeable in diabetes management concepts related to: Being Active and Monitoring    Continue education  with the following diabetes management concepts: Healthy Eating, Reducing Risks and Healthy Coping    Based on learning assessment above, most appropriate setting for further diabetes education would be: Individual setting.    INTERVENTIONS:    Education provided today on:  AADE Self-Care Behaviors:  Healthy Eating: consistency in amount, composition, and timing of food intake and weight reduction  Being Active: relationship to blood glucose and describe appropriate activity program  Monitoring: purpose, log and interpret results and frequency of monitoring  Reducing Risks: prevention, early diagnostic measures and treatment of complications and A1C - goals, relating to blood glucose levels, how often to check  Healthy Coping: benefits of making appropriate lifestyle changes    Opportunities for ongoing education and support in diabetes-self management were discussed. Pt verbalized understanding of concepts discussed and recommendations provided today.       Education Materials Provided:  BG Log Sheet, My Plate Planner and 1500 calorie meal planning          PLAN    1) Increase physical activity as able.    Goal 150 minutes per week of physical activity.    2) Include carbohydrates with each meal 3 servings (45 grams) per meal and 1-2 servings per snack (15-30 grams)    3) Continue monitoring BG at least once per day.  When you have enough data for fasting, can move to after meal testing.   Goals:    -Fastin 130 mg/dL    -2 hours after the start of a meal: <180 mg/dL    Topics to cover at upcoming visits: Healthy Eating, Being Active, Problem Solving, Reducing Risks and Healthy Coping  Follow-up: Stacie declined scheduling a follow up at this time.  She will reach out when future questions arise.    See Goals Section for co-developed, patient-stated behavior change goals.  AVS provided to patient today.      SUBJECTIVE / OBJECTIVE:  Presents for: Initial Assessment for new diagnosis  Accompanied by:  "Self  Diabetes education in the past 24mo: No  Focus of Visit: Monitoring, Healthy Eating  Diabetes type: Type 2  Date of diagnosis: January 2022  How confident are you filling out medical forms by yourself:: Not Assessed  Diabetes management related comments/concerns: what to eat, diet  Difficulty affording diabetes medication?: No  Difficulty affording diabetes testing supplies?: Yes  Other concerns:: None  Cultural Influences/Ethnic Background:  Choose not to answer      Diabetes Symptoms & Complications:  Fatigue: No  Neuropathy: No  Polydipsia: No  Polyphagia: No  Polyuria: No  Visual change: No  Slow healing wounds: No  Symptom course: Improving  Weight trend: Stable  Complications assessed today?: No    Patient Problem List and Family Medical History reviewed for relevant medical history, current medical status, and diabetes risk factors.    Vitals:  LMP 05/13/2004   Estimated body mass index is 28.53 kg/m  as calculated from the following:    Height as of 1/26/22: 1.575 m (5' 2\").    Weight as of 3/4/22: 70.8 kg (156 lb).   Last 3 BP:   BP Readings from Last 3 Encounters:   03/04/22 124/76   02/07/22 125/69   10/16/21 121/82       History   Smoking Status     Former Smoker     Packs/day: 0.00     Years: 0.00   Smokeless Tobacco     Never Used     Comment: 1980-quit       Labs:  Lab Results   Component Value Date    A1C 6.5 01/27/2022    A1C 5.9 04/20/2021     Lab Results   Component Value Date     02/07/2022    GLC 98 02/07/2022     03/25/2021     Lab Results   Component Value Date     03/25/2021     HDL Cholesterol   Date Value Ref Range Status   03/25/2021 42 (L) >49 mg/dL Final   ]  GFR Estimate   Date Value Ref Range Status   02/07/2022 >90 >60 mL/min/1.73m2 Final     Comment:     Effective December 21, 2021 eGFRcr in adults is calculated using the 2021 CKD-EPI creatinine equation which includes age and gender (Jonh anton al., NEJ, DOI: 10.1056/ZODDkw9481693)   03/25/2021 >90 >60 " mL/min/[1.73_m2] Final     Comment:     Non  GFR Calc  Starting 12/18/2018, serum creatinine based estimated GFR (eGFR) will be   calculated using the Chronic Kidney Disease Epidemiology Collaboration   (CKD-EPI) equation.       GFR Estimate If Black   Date Value Ref Range Status   03/25/2021 >90 >60 mL/min/[1.73_m2] Final     Comment:      GFR Calc  Starting 12/18/2018, serum creatinine based estimated GFR (eGFR) will be   calculated using the Chronic Kidney Disease Epidemiology Collaboration   (CKD-EPI) equation.       Lab Results   Component Value Date    CR 0.63 02/07/2022    CR 0.67 03/25/2021     No results found for: MICROALBUMIN    Healthy Eating:  Healthy Eating Assessed Today: Yes  Meals include: Breakfast, Lunch, Dinner  Breakfast: 9-10am egg and toast (ezekial bread), butter with grapefruit, coffee or tea  Lunch: sometimes if hungry- has soup broth based  Dinner: sauteed vegetables, some type of meat or fish  Snacks: protein  Beverages: Tea, Coffee, Water  Has patient met with a dietitian in the past?: Yes ([facility] nutrition advise)    Being Active:  Being Active Assessed Today: Yes  Exercise:: Unable to exercise (recooperating from hospitalization)    Monitoring:  Monitoring Assessed Today: Yes  Did patient bring glucose meter to appointment? : No  Blood Glucose Meter: Accu-chek  Times checking blood sugar at home (number): 1  Times checking blood sugar at home (per): Day  Blood glucose trend: Fluctuating    Glucose data:  She has a Meter- Accu chek.  Is using phone to track BG.  Tests BG once per day- usually in the morning/fasting.  BG ranges from  mg/dL.  Today's was 88 mg/dL       Taking Medications:  Diabetes Medication(s)     Biguanides       metFORMIN (GLUCOPHAGE) 500 MG tablet    Take 1 tablet (500 mg) by mouth 2 times daily (with meals)          Taking Medication Assessed Today: Yes  Current Treatments: Diet, Oral Medication (taken by mouth)  Problems taking  diabetes medications regularly?: Yes  Diabetes medication side effects?: No    Problem Solving:  Is the patient at risk for hypoglycemia?: No  Is the patient at risk for DKA?: No  Does patient have severe weather/disaster plan for diabetes management?: No  Does patient have sick day plan for diabetes management?: No        Reducing Risks:  Reducing Risks Assessed Today: No  Diabetes Risks: Age over 45 years, Family History, Hyperlipidemia  CAD Risks: Diabetes Mellitus, Family history  Has dilated eye exam at least once a year?: Yes  Sees dentist every 6 months?: Yes  Feet checked by healthcare provider in the last year?: No    Healthy Coping:  Emotional response to diabetes: Ready to learn  Informal Support system:: Children  Stage of change: ACTION (Actively working towards change)  Patient Activation Measure Survey Score:  AILEEN Score (Last Two) 7/15/2011   AILEEN Raw Score 46   Activation Score 75.3   AILEEN Level 4             Amanda Zacarias RDN, LD  Outpatient Diabetes Education  Adult Diabetes Education Triage [phone]    Time Spent: 44 minutes  Encounter Type: Individual        Any diabetes medication dose changes were made via the Certified Diabetes Care & Education Protocol in collaboration with the patient's referring provider. A copy of this encounter was shared with the provider.

## 2022-03-14 ENCOUNTER — HOSPITAL ENCOUNTER (OUTPATIENT)
Dept: PHYSICAL THERAPY | Facility: CLINIC | Age: 67
Setting detail: THERAPIES SERIES
Discharge: HOME | End: 2022-03-14
Attending: FAMILY MEDICINE
Payer: COMMERCIAL

## 2022-03-14 PROCEDURE — 97110 THERAPEUTIC EXERCISES: CPT | Mod: GP | Performed by: PHYSICAL THERAPIST

## 2022-03-15 ENCOUNTER — HOSPITAL ENCOUNTER (OUTPATIENT)
Dept: CARDIAC REHAB | Facility: CLINIC | Age: 67
Discharge: HOME | End: 2022-03-15
Attending: FAMILY MEDICINE
Payer: COMMERCIAL

## 2022-03-15 PROCEDURE — 94625 PHY/QHP OP PULM RHB W/O MNTR: CPT | Performed by: REHABILITATION PRACTITIONER

## 2022-03-16 ENCOUNTER — HOSPITAL ENCOUNTER (OUTPATIENT)
Dept: PHYSICAL THERAPY | Facility: CLINIC | Age: 67
Setting detail: THERAPIES SERIES
Discharge: HOME | End: 2022-03-16
Attending: FAMILY MEDICINE
Payer: COMMERCIAL

## 2022-03-16 PROCEDURE — 97110 THERAPEUTIC EXERCISES: CPT | Mod: GP | Performed by: PHYSICAL THERAPIST

## 2022-03-16 PROCEDURE — 97112 NEUROMUSCULAR REEDUCATION: CPT | Mod: GP | Performed by: PHYSICAL THERAPIST

## 2022-03-17 ENCOUNTER — HOSPITAL ENCOUNTER (OUTPATIENT)
Dept: CARDIAC REHAB | Facility: CLINIC | Age: 67
Discharge: HOME | End: 2022-03-17
Attending: FAMILY MEDICINE
Payer: COMMERCIAL

## 2022-03-17 PROCEDURE — 94625 PHY/QHP OP PULM RHB W/O MNTR: CPT | Performed by: REHABILITATION PRACTITIONER

## 2022-03-21 ENCOUNTER — HOSPITAL ENCOUNTER (OUTPATIENT)
Dept: PHYSICAL THERAPY | Facility: CLINIC | Age: 67
Setting detail: THERAPIES SERIES
Discharge: HOME | End: 2022-03-21
Attending: FAMILY MEDICINE
Payer: COMMERCIAL

## 2022-03-21 PROCEDURE — 97110 THERAPEUTIC EXERCISES: CPT | Mod: GP | Performed by: PHYSICAL THERAPIST

## 2022-03-22 ENCOUNTER — HOSPITAL ENCOUNTER (OUTPATIENT)
Dept: CARDIAC REHAB | Facility: CLINIC | Age: 67
Discharge: HOME | End: 2022-03-22
Attending: FAMILY MEDICINE
Payer: COMMERCIAL

## 2022-03-22 PROCEDURE — 94625 PHY/QHP OP PULM RHB W/O MNTR: CPT | Performed by: REHABILITATION PRACTITIONER

## 2022-03-22 PROCEDURE — 94626 PHY/QHP OP PULM RHB W/MNTR: CPT | Performed by: REHABILITATION PRACTITIONER

## 2022-03-24 ENCOUNTER — HOSPITAL ENCOUNTER (OUTPATIENT)
Dept: CARDIAC REHAB | Facility: CLINIC | Age: 67
Discharge: HOME | End: 2022-03-24
Attending: FAMILY MEDICINE
Payer: COMMERCIAL

## 2022-03-24 ENCOUNTER — PATIENT OUTREACH (OUTPATIENT)
Dept: CARE COORDINATION | Facility: CLINIC | Age: 67
End: 2022-03-24
Payer: COMMERCIAL

## 2022-03-24 DIAGNOSIS — J12.82 PNEUMONIA DUE TO 2019 NOVEL CORONAVIRUS: Primary | ICD-10-CM

## 2022-03-24 DIAGNOSIS — U07.1 PNEUMONIA DUE TO 2019 NOVEL CORONAVIRUS: Primary | ICD-10-CM

## 2022-03-24 PROCEDURE — 94625 PHY/QHP OP PULM RHB W/O MNTR: CPT

## 2022-03-24 ASSESSMENT — ACTIVITIES OF DAILY LIVING (ADL): DEPENDENT_IADLS:: INDEPENDENT

## 2022-03-25 ENCOUNTER — HOSPITAL ENCOUNTER (OUTPATIENT)
Dept: PHYSICAL THERAPY | Facility: CLINIC | Age: 67
Setting detail: THERAPIES SERIES
Discharge: HOME | End: 2022-03-25
Attending: FAMILY MEDICINE
Payer: COMMERCIAL

## 2022-03-25 PROCEDURE — 97112 NEUROMUSCULAR REEDUCATION: CPT | Mod: GP | Performed by: PHYSICAL THERAPIST

## 2022-03-25 PROCEDURE — 97110 THERAPEUTIC EXERCISES: CPT | Mod: GP | Performed by: PHYSICAL THERAPIST

## 2022-03-28 ENCOUNTER — HOSPITAL ENCOUNTER (OUTPATIENT)
Dept: PHYSICAL THERAPY | Facility: CLINIC | Age: 67
Setting detail: THERAPIES SERIES
Discharge: HOME | End: 2022-03-28
Attending: FAMILY MEDICINE
Payer: COMMERCIAL

## 2022-03-28 PROCEDURE — 97110 THERAPEUTIC EXERCISES: CPT | Mod: GP | Performed by: PHYSICAL THERAPIST

## 2022-03-28 PROCEDURE — 97112 NEUROMUSCULAR REEDUCATION: CPT | Mod: GP | Performed by: PHYSICAL THERAPIST

## 2022-03-29 ENCOUNTER — HOSPITAL ENCOUNTER (OUTPATIENT)
Dept: CARDIAC REHAB | Facility: CLINIC | Age: 67
Discharge: HOME | End: 2022-03-29
Attending: FAMILY MEDICINE
Payer: COMMERCIAL

## 2022-03-29 PROCEDURE — 94625 PHY/QHP OP PULM RHB W/O MNTR: CPT | Performed by: REHABILITATION PRACTITIONER

## 2022-03-31 ENCOUNTER — HOSPITAL ENCOUNTER (OUTPATIENT)
Dept: CARDIAC REHAB | Facility: CLINIC | Age: 67
Discharge: HOME | End: 2022-03-31
Attending: FAMILY MEDICINE
Payer: COMMERCIAL

## 2022-03-31 PROCEDURE — 94625 PHY/QHP OP PULM RHB W/O MNTR: CPT

## 2022-04-01 ENCOUNTER — HOSPITAL ENCOUNTER (OUTPATIENT)
Dept: PHYSICAL THERAPY | Facility: CLINIC | Age: 67
Setting detail: THERAPIES SERIES
Discharge: HOME | End: 2022-04-01
Attending: FAMILY MEDICINE
Payer: COMMERCIAL

## 2022-04-01 PROCEDURE — 97110 THERAPEUTIC EXERCISES: CPT | Mod: GP | Performed by: PHYSICAL THERAPIST

## 2022-04-01 PROCEDURE — 97112 NEUROMUSCULAR REEDUCATION: CPT | Mod: GP | Performed by: PHYSICAL THERAPIST

## 2022-04-04 ENCOUNTER — PATIENT OUTREACH (OUTPATIENT)
Dept: NURSING | Facility: CLINIC | Age: 67
End: 2022-04-04
Payer: COMMERCIAL

## 2022-04-04 DIAGNOSIS — J12.82 PNEUMONIA DUE TO 2019 NOVEL CORONAVIRUS: Primary | ICD-10-CM

## 2022-04-04 DIAGNOSIS — U07.1 PNEUMONIA DUE TO 2019 NOVEL CORONAVIRUS: Primary | ICD-10-CM

## 2022-04-04 ASSESSMENT — ACTIVITIES OF DAILY LIVING (ADL): DEPENDENT_IADLS:: INDEPENDENT

## 2022-04-04 NOTE — LETTER
[facility]  [address] [place] [address]    April 4, 2022    Stacie Hair  [address]  [facility] [address]    Dear Stacie,  Your Care Team congratulates you on your journey to maintain wellness. This document will help guide you on your journey to maintain a healthy lifestyle.  You can use this to help you overcome any barriers you may encounter.  If you should have any questions or concerns, you can contact the members of your Care Team or contact your Primary Care Clinic for assistance.     Health Maintenance  Health Maintenance Reviewed: Not assessed    My Access Plan  Medical Emergency 911   Primary Clinic Line [facility], located within [facility] - [phone]   24 Hour Appointment Line [phone] or  [phone]) (toll-free)   24 Hour Nurse Line [phone] (toll-free)   Preferred Urgent Care [facility], [phone]   [facility]  [phone]   Preferred Pharmacy [id number] - [place] [address]      Behavioral Health Crisis Line The National Suicide Prevention Lifeline at 1-888.851.6236 or 911     My Care Team Members  Patient Care Team       Relationship Specialty Notifications Start End    Schoen, Gregory G, MD PCP - General   10/29/00     Phone: [phone] Fax: [phone]         [address] [place] [address]    Schoen, Gregory G, MD Assigned PCP   9/13/15     Phone: [phone] Fax: [phone]         [address] [place] [address]    Fuentes Mina MD Assigned Surgical Provider   10/23/20     Phone: [phone] Pager: [phone] Fax: [phone]        [address] [place] [address]    Carlota Novoa APRN CNP Assigned Neuroscience Provider   6/13/21     Phone: [phone] Fax: [phone]          [id number] [facility] [address]    Kenia Zacarias  Diabetes Educator   3/11/22     Phone: [phone] Fax: [phone]         [address] [facility] [address]              Goals        COMPLETED: Medical (pt-stated)       Goal Statement: I will increase my strength and breathe better in the next 1-2 months  Date Goal set: 2/8/2022  Barriers: Deconditioned   Strengths: Daughter is staying for 2 weeks   Date to Achieve By: 5/8/2022  Patient expressed understanding of goal: Yes  Action steps to achieve this goal:  1. I will use oxygen at 1.5 litters at night and with activity   2. I will keep my oxygen level above 90%  I will call with increased shortness of breath episodes,fever or coughing up a colored sputum   3. I will check my weight daily and call if weight gain is 2-3# in a day or 5# in a week   4. I will rest,drink plenty of water ,cough and deep breath   5. I will participate with the Get Well Loop              Advance Care Plans/Directives Type:      We notice that you do not have an Advance Directive on file. Upon completion of your Health Care Directive, please bring a copy with you to your next office visit.    It has been your Clinic Care Team's pleasure to work with you on your goals.    [facility],  [facility]   Nola Nugent RN, Care Coordinator      [phone]

## 2022-04-04 NOTE — PROGRESS NOTES
Clinic Care Coordination Contact    Follow Up Progress Note   Hospitalist Discharge Summary       Date of Admission:  1/26/2022  Date of Discharge:  2/7/2022  Discharge Diagnoses     Principal Problem:    Pneumonia due to 2019 novel coronavirus     Assessment: Patient reports she is on oxygen at 1 liter at night only   Oximeter reading during the day is 95-96%  Weight stable and no increased leg edema  Blood sugar readings in the 90's   Pulmonary Rehabilitation is going well     Care Gaps:    Health Maintenance Due   Topic Date Due     DEXA  Never done     DIABETIC FOOT EXAM  Never done     EYE EXAM  Never done     COVID-19 Vaccine (1) Never done     ZOSTER IMMUNIZATION (1 of 2) Never done     ADVANCE CARE PLANNING  09/09/2020     MAMMO SCREENING  09/11/2020     MEDICARE ANNUAL WELLNESS VISIT  07/02/2021     FALL RISK ASSESSMENT  Never done     Pneumococcal Vaccine: Pediatrics (0 to 5 Years) and At-Risk Patients (6 to 64 Years) (1 of 1 - PPSV23) Never done     Pneumococcal Vaccine: 65+ Years (1 of 1 - PPSV23) Never done     DTAP/TDAP/TD IMMUNIZATION (2 - Td or Tdap) 07/15/2021     INFLUENZA VACCINE (1) Never done     COLORECTAL CANCER SCREENING  09/26/2021     PHQ-2 (once per calendar year)  01/01/2022     LIPID  03/25/2022     MICROALBUMIN  03/25/2022       will discuss at next PCP visit     Goals addressed this encounter:   Goals Addressed                    This Visit's Progress       COMPLETED: Medical (pt-stated)   90%      Goal Statement: I will increase my strength and breathe better in the next 1-2 months  Date Goal set: 2/8/2022  Barriers: Deconditioned   Strengths: Daughter is staying for 2 weeks   Date to Achieve By: 5/8/2022  Patient expressed understanding of goal: Yes  Action steps to achieve this goal:  1. I will use oxygen at 1.5 litters at night and with activity   2. I will keep my oxygen level above 90%  I will call with increased shortness of breath episodes,fever or coughing up a colored sputum    3. I will check my weight daily and call if weight gain is 2-3# in a day or 5# in a week   4. I will rest,drink plenty of water ,cough and deep breath   5. I will participate with the Get Well Loop             Intervention/Education provided during outreach: CC RN is available in the future for any needs.  Patient agrees with the plan         Plan:   Patient will continue Pulmonary Rehabilitation   No unmet needs, no further care coordination needed at this time   Graduation letter sent via My Chart     [facility]   Nola Nugent RN, Care Coordinator   [facility]'s   E-mail [email or web address]   [phone]

## 2022-04-05 ENCOUNTER — HOSPITAL ENCOUNTER (OUTPATIENT)
Dept: CARDIAC REHAB | Facility: CLINIC | Age: 67
Discharge: HOME | End: 2022-04-05
Attending: FAMILY MEDICINE
Payer: COMMERCIAL

## 2022-04-05 ENCOUNTER — HOSPITAL ENCOUNTER (OUTPATIENT)
Dept: PHYSICAL THERAPY | Facility: CLINIC | Age: 67
Setting detail: THERAPIES SERIES
Discharge: HOME | End: 2022-04-05
Attending: FAMILY MEDICINE
Payer: COMMERCIAL

## 2022-04-05 PROCEDURE — 94625 PHY/QHP OP PULM RHB W/O MNTR: CPT | Performed by: REHABILITATION PRACTITIONER

## 2022-04-05 PROCEDURE — 97110 THERAPEUTIC EXERCISES: CPT | Mod: GP | Performed by: PHYSICAL THERAPIST

## 2022-04-05 PROCEDURE — 97112 NEUROMUSCULAR REEDUCATION: CPT | Mod: GP | Performed by: PHYSICAL THERAPIST

## 2022-04-07 ENCOUNTER — HOSPITAL ENCOUNTER (OUTPATIENT)
Dept: CARDIAC REHAB | Facility: CLINIC | Age: 67
Discharge: HOME | End: 2022-04-07
Attending: FAMILY MEDICINE
Payer: COMMERCIAL

## 2022-04-07 PROCEDURE — 94625 PHY/QHP OP PULM RHB W/O MNTR: CPT | Performed by: REHABILITATION PRACTITIONER

## 2022-04-08 ENCOUNTER — OFFICE VISIT (OUTPATIENT)
Dept: FAMILY MEDICINE | Facility: CLINIC | Age: 67
End: 2022-04-08
Payer: COMMERCIAL

## 2022-04-08 ENCOUNTER — HOSPITAL ENCOUNTER (OUTPATIENT)
Dept: PHYSICAL THERAPY | Facility: CLINIC | Age: 67
Setting detail: THERAPIES SERIES
Discharge: HOME | End: 2022-04-08
Attending: FAMILY MEDICINE
Payer: COMMERCIAL

## 2022-04-08 VITALS
WEIGHT: 154 LBS | TEMPERATURE: 97.3 F | DIASTOLIC BLOOD PRESSURE: 76 MMHG | OXYGEN SATURATION: 99 % | SYSTOLIC BLOOD PRESSURE: 122 MMHG | BODY MASS INDEX: 28.17 KG/M2 | HEART RATE: 90 BPM

## 2022-04-08 DIAGNOSIS — E11.65 TYPE 2 DIABETES MELLITUS WITH HYPERGLYCEMIA, WITHOUT LONG-TERM CURRENT USE OF INSULIN (H): ICD-10-CM

## 2022-04-08 DIAGNOSIS — J12.82 PNEUMONIA DUE TO 2019 NOVEL CORONAVIRUS: ICD-10-CM

## 2022-04-08 DIAGNOSIS — I26.94 MULTIPLE SUBSEGMENTAL PULMONARY EMBOLI WITHOUT ACUTE COR PULMONALE (H): ICD-10-CM

## 2022-04-08 DIAGNOSIS — I42.9 CARDIOMYOPATHY, UNSPECIFIED TYPE (H): Primary | ICD-10-CM

## 2022-04-08 DIAGNOSIS — U07.1 PNEUMONIA DUE TO 2019 NOVEL CORONAVIRUS: ICD-10-CM

## 2022-04-08 LAB — HBA1C MFR BLD: 5.3 % (ref 0–5.6)

## 2022-04-08 PROCEDURE — 83036 HEMOGLOBIN GLYCOSYLATED A1C: CPT | Performed by: FAMILY MEDICINE

## 2022-04-08 PROCEDURE — 97112 NEUROMUSCULAR REEDUCATION: CPT | Mod: GP | Performed by: PHYSICAL THERAPIST

## 2022-04-08 PROCEDURE — 99214 OFFICE O/P EST MOD 30 MIN: CPT | Performed by: FAMILY MEDICINE

## 2022-04-08 PROCEDURE — 97110 THERAPEUTIC EXERCISES: CPT | Mod: GP | Performed by: PHYSICAL THERAPIST

## 2022-04-08 PROCEDURE — 36415 COLL VENOUS BLD VENIPUNCTURE: CPT | Performed by: FAMILY MEDICINE

## 2022-04-08 ASSESSMENT — PAIN SCALES - GENERAL: PAINLEVEL: NO PAIN (0)

## 2022-04-08 NOTE — PROGRESS NOTES
Assessment & Plan     Cardiomyopathy, unspecified type (H)  Patient will be scheduled for an echocardiogram for follow-up of her cardiomyopathy.  This will help determine the degree to which her shortness of breath can be attributed to pulmonary versus cardiac issues.  She will be following up with me a week or so after completion of the echocardiogram to review results.  - Echocardiogram Complete; Future    Pneumonia due to 2019 novel coronavirus  Multiple subsegmental pulmonary emboli without acute cor pulmonale (H)  Clinically is showing improvement with her level of oxygenation with activity.  She still experiences fatigue at about 2 METS but does maintain normal saturations above 92%.  We will contact her oxygen supplier about setting up an overnight oximetry assessment to determine if we can safely discontinue nocturnal oxygen as she is no longer using it during the daytime hours.  We will also do a follow-up CT scan in 2 to 3 weeks prior to her follow-up visit to assess resolution of her pulmonary emboli as well as any evidence of fibrosis or potentially more permanent damage to her lung parenchyma.  She would like to get off Xarelto and we will determine that based on the findings of the CT scan.  There are also some incidental findings in her adrenal glands as well as evidence of fatty liver.  We did put in a referral for GI/liver specialist but also will see status of her fatty liver on the follow-up CT scan.    Type 2 diabetes mellitus with hyperglycemia, without long-term current use of insulin (H)  Patient has been following a low carbohydrate/diabetic diet and monitoring daily fasting sugars with good results with highest a.m. sugars the last couple weeks being at just 114 fasting.  Will check A1c and determine if we need to make any adjustments in her Metformin but I suspect she will be improved over the 6.5% there was identified in January when she was hospitalized.      Return in about 4 weeks  (around 5/6/2022) for in person.    Gregory G. Schoen, MD  [facility]          Molly Quinones is a 66 year old who presents for the following health issues     HPI     Follow up Stacie is here in follow-up of her Covid pneumonia, bilateral pulmonary emboli secondary to Covid pneumonia, newly diagnosed type 2 diabetes, cardiomyopathy and incidental findings on CT scan.    States her breathing is getting better.  She is able to do limited activities of daily living without shortness of breath at this time and in her pulmonary rehab has been shown to have oxygen saturations remaining at 92 to 94% with these activities.  She therefore is no longer using oxygen during the day but continues to sleep with this at night.  We therefore are at a point where we should do an overnight pulse oximetry assessment to determine if overnight oxygen continues to be necessary.  She is now 2+ months from her diagnosis and admission for the above problems on 1/26/2022.  We therefore coming up on 3 months at which time the plan was to do a follow-up CT scan of her chest to further assess resolution of her bilateral pulmonary emboli and resolution of her Covid pneumonia.  We also were going to do a follow-up echocardiogram to see if her cardiomyopathy was temporary and hopefully recovered at this point.  She remains on Xarelto at the present time for management of her pulmonary emboli.      Blood sugars are stable, fasting AM sugars running in the 90s to 114 range over the past several weeks.  She has been adapting to a low carbohydrate/diabetic diet.  She continues to take Metformin 500 mg twice daily and is tolerating that well without side effects and no episodes of hypoglycemia.    Current Outpatient Medications   Medication Sig Dispense Refill     alcohol swab prep pads Use to swab area of injection/eli as directed. 100 each 3     blood glucose (NO BRAND SPECIFIED) test strip Use to test blood sugar  oncedaily or as directed. To accompany: Blood Glucose Monitor Brands: per insurance. 100 strip 6     blood glucose calibration (NO BRAND SPECIFIED) solution To accompany: Blood Glucose Monitor Brands: per insurance. 2 each 0     blood glucose monitoring (NO BRAND SPECIFIED) meter device kit Use to test blood sugar once daily or as directed. Preferred blood glucose meter OR supplies to accompany: Blood Glucose Monitor Brands: per insurance. 1 kit 0     metFORMIN (GLUCOPHAGE) 500 MG tablet Take 1 tablet (500 mg) by mouth 2 times daily (with meals) 60 tablet 3     Omega-3 Fatty Acids (OMEGA 3 PO) Take by mouth daily        oxazepam (SERAX) 10 MG capsule TAKE 1 CAPSULE BY MOUTH TWO TIMES A DAY AS NEEDED FOR ANXIETY (VERTIGO) 40 capsule 1     thin (NO BRAND SPECIFIED) lancets Use with lanceting device to monitor blood daily or as directed. To accompany: Blood Glucose Monitor Brands: per insurance. 100 each 0     XARELTO ANTICOAGULANT 20 MG TABS tablet TAKE 1 TABLET (20 MG) BY MOUTH DAILY (WITH DINNER) 30 tablet 0     albuterol (PROAIR HFA/PROVENTIL HFA/VENTOLIN HFA) 108 (90 Base) MCG/ACT inhaler Inhale 2 puffs into the lungs every 6 hours (Patient not taking: Reported on 4/8/2022) 18 g 0     cyclobenzaprine (FLEXERIL) 10 MG tablet Take 0.5-1 tablets (5-10 mg) by mouth 3 times daily as needed for muscle spasms (Patient not taking: Reported on 4/8/2022) 30 tablet 0     STATIN NOT PRESCRIBED (INTENTIONAL) Please choose reason not prescribed from choices below. (Patient not taking: Reported on 4/8/2022)           Review of Systems   Constitutional: No fever or chills.  She did lose about 20 pounds from her pre-Covid right and remained stable in the mid 150s.  HEENT: Negative  CVR: Negative  RESP: As above  HEME: No bruising or signs of bleeding associated with use of Xarelto.      Objective    LMP 05/13/2004   Body mass index is 28.17 kg/m .  Physical Exam   Alert, oriented in no acute distress.  Breathing is comfortable and  unlabored.  Lungs are clear to auscultation with good air movement throughout.  Heart is regular without murmurs, clicks or rubs.  Extremities without edema.  Visible  skin without rash or evidence of bruising.

## 2022-04-09 DIAGNOSIS — U07.1 PNEUMONIA DUE TO 2019 NOVEL CORONAVIRUS: ICD-10-CM

## 2022-04-09 DIAGNOSIS — J12.82 PNEUMONIA DUE TO 2019 NOVEL CORONAVIRUS: ICD-10-CM

## 2022-04-11 RX ORDER — RIVAROXABAN 20 MG/1
TABLET, FILM COATED ORAL
Qty: 30 TABLET | Refills: 0 | Status: SHIPPED | OUTPATIENT
Start: 2022-04-11 | End: 2022-05-06

## 2022-04-11 NOTE — TELEPHONE ENCOUNTER
Routing refill request to provider for review/approval because:  Labs not current:  [facility]ance.     Alea Allison RN

## 2022-04-12 ENCOUNTER — HOSPITAL ENCOUNTER (OUTPATIENT)
Dept: CARDIAC REHAB | Facility: CLINIC | Age: 67
Discharge: HOME | End: 2022-04-12
Attending: FAMILY MEDICINE
Payer: COMMERCIAL

## 2022-04-12 ENCOUNTER — TELEPHONE (OUTPATIENT)
Dept: FAMILY MEDICINE | Facility: CLINIC | Age: 67
End: 2022-04-12

## 2022-04-12 DIAGNOSIS — U07.1 PNEUMONIA DUE TO 2019 NOVEL CORONAVIRUS: Primary | ICD-10-CM

## 2022-04-12 DIAGNOSIS — I26.94 MULTIPLE SUBSEGMENTAL PULMONARY EMBOLI WITHOUT ACUTE COR PULMONALE (H): ICD-10-CM

## 2022-04-12 DIAGNOSIS — J12.82 PNEUMONIA DUE TO 2019 NOVEL CORONAVIRUS: Primary | ICD-10-CM

## 2022-04-12 PROCEDURE — 94625 PHY/QHP OP PULM RHB W/O MNTR: CPT | Performed by: REHABILITATION PRACTITIONER

## 2022-04-12 NOTE — TELEPHONE ENCOUNTER
Called and left a voice mail for Stevie asking her to call back with a fax number so we can fax the orders. Writer has the orders at my desk ready to be faxed.

## 2022-04-12 NOTE — TELEPHONE ENCOUNTER
Reason for Call: Request for an order or referral:    Order or referral being requested: for KAI overnight oximeter test    Date needed: as soon as possible    Has the patient been seen by the PCP for this problem? yes    Additional comments: Stevie calling from [employer], saw that you wanted this done, as discussed at patients visit on 4/08/22. She is able to help the patient with this, but needs an order. If you'd like them to set her up for this please put in order in and let Stevie know.     Phone number Patient can be reached at:  Other phone number:  [phone]    Best Time:  N/A    Can we leave a detailed message on this number?  YES    Call taken on 4/12/2022 at 9:16 AM by Estefania Bliss

## 2022-04-12 NOTE — TELEPHONE ENCOUNTER
Order was placed in Epic to do an overnight on current oxygen levels and then if meets parameters as specified, to repeat without oxygen on a subsequent night. Please print and fax as requested.   Electronically signed by Greg Schoen, MD

## 2022-04-14 ENCOUNTER — APPOINTMENT (OUTPATIENT)
Dept: CT IMAGING | Facility: CLINIC | Age: 67
End: 2022-04-14
Attending: EMERGENCY MEDICINE
Payer: COMMERCIAL

## 2022-04-14 ENCOUNTER — HOSPITAL ENCOUNTER (OUTPATIENT)
Dept: CARDIAC REHAB | Facility: CLINIC | Age: 67
Discharge: HOME | End: 2022-04-14
Attending: FAMILY MEDICINE
Payer: COMMERCIAL

## 2022-04-14 ENCOUNTER — HOSPITAL ENCOUNTER (EMERGENCY)
Facility: CLINIC | Age: 67
Discharge: HOME | End: 2022-04-14
Attending: EMERGENCY MEDICINE | Admitting: EMERGENCY MEDICINE
Payer: COMMERCIAL

## 2022-04-14 ENCOUNTER — HOSPITAL ENCOUNTER (OUTPATIENT)
Dept: PHYSICAL THERAPY | Facility: CLINIC | Age: 67
Setting detail: THERAPIES SERIES
Discharge: HOME | End: 2022-04-14
Attending: FAMILY MEDICINE
Payer: COMMERCIAL

## 2022-04-14 VITALS
BODY MASS INDEX: 27.8 KG/M2 | RESPIRATION RATE: 16 BRPM | DIASTOLIC BLOOD PRESSURE: 83 MMHG | OXYGEN SATURATION: 96 % | SYSTOLIC BLOOD PRESSURE: 143 MMHG | HEART RATE: 80 BPM | WEIGHT: 152 LBS

## 2022-04-14 DIAGNOSIS — K64.8 OTHER HEMORRHOIDS: ICD-10-CM

## 2022-04-14 DIAGNOSIS — K62.5 RECTAL BLEEDING: ICD-10-CM

## 2022-04-14 LAB
ALBUMIN SERPL-MCNC: 3.8 G/DL (ref 3.4–5)
ALP SERPL-CCNC: 71 U/L (ref 40–150)
ALT SERPL W P-5'-P-CCNC: 29 U/L (ref 0–50)
ANION GAP SERPL CALCULATED.3IONS-SCNC: 4 MMOL/L (ref 3–14)
AST SERPL W P-5'-P-CCNC: 18 U/L (ref 0–45)
BASOPHILS # BLD AUTO: 0 10E3/UL (ref 0–0.2)
BASOPHILS NFR BLD AUTO: 0 %
BILIRUB SERPL-MCNC: 0.6 MG/DL (ref 0.2–1.3)
BUN SERPL-MCNC: 15 MG/DL (ref 7–30)
CALCIUM SERPL-MCNC: 9.2 MG/DL (ref 8.5–10.1)
CHLORIDE BLD-SCNC: 110 MMOL/L (ref 94–109)
CO2 SERPL-SCNC: 28 MMOL/L (ref 20–32)
CREAT SERPL-MCNC: 0.56 MG/DL (ref 0.52–1.04)
CRP SERPL-MCNC: 26 MG/L (ref 0–8)
EOSINOPHIL # BLD AUTO: 0.2 10E3/UL (ref 0–0.7)
EOSINOPHIL NFR BLD AUTO: 3 %
ERYTHROCYTE [DISTWIDTH] IN BLOOD BY AUTOMATED COUNT: 14.1 % (ref 10–15)
GFR SERPL CREATININE-BSD FRML MDRD: >90 ML/MIN/1.73M2
GLUCOSE BLD-MCNC: 99 MG/DL (ref 70–99)
HCT VFR BLD AUTO: 38 % (ref 35–47)
HEMOCCULT STL QL: POSITIVE
HGB BLD-MCNC: 12.7 G/DL (ref 11.7–15.7)
IMM GRANULOCYTES # BLD: 0 10E3/UL
IMM GRANULOCYTES NFR BLD: 0 %
INR PPP: 1.33 (ref 0.85–1.15)
LIPASE SERPL-CCNC: 98 U/L (ref 73–393)
LYMPHOCYTES # BLD AUTO: 1.6 10E3/UL (ref 0.8–5.3)
LYMPHOCYTES NFR BLD AUTO: 20 %
MCH RBC QN AUTO: 30 PG (ref 26.5–33)
MCHC RBC AUTO-ENTMCNC: 33.4 G/DL (ref 31.5–36.5)
MCV RBC AUTO: 90 FL (ref 78–100)
MONOCYTES # BLD AUTO: 0.5 10E3/UL (ref 0–1.3)
MONOCYTES NFR BLD AUTO: 7 %
NEUTROPHILS # BLD AUTO: 5.4 10E3/UL (ref 1.6–8.3)
NEUTROPHILS NFR BLD AUTO: 70 %
NRBC # BLD AUTO: 0 10E3/UL
NRBC BLD AUTO-RTO: 0 /100
PLATELET # BLD AUTO: 266 10E3/UL (ref 150–450)
POTASSIUM BLD-SCNC: 3.5 MMOL/L (ref 3.4–5.3)
PROT SERPL-MCNC: 7.7 G/DL (ref 6.8–8.8)
RBC # BLD AUTO: 4.23 10E6/UL (ref 3.8–5.2)
SODIUM SERPL-SCNC: 142 MMOL/L (ref 133–144)
WBC # BLD AUTO: 7.8 10E3/UL (ref 4–11)

## 2022-04-14 PROCEDURE — 83690 ASSAY OF LIPASE: CPT | Performed by: EMERGENCY MEDICINE

## 2022-04-14 PROCEDURE — 80053 COMPREHEN METABOLIC PANEL: CPT | Performed by: EMERGENCY MEDICINE

## 2022-04-14 PROCEDURE — 85025 COMPLETE CBC W/AUTO DIFF WBC: CPT | Performed by: EMERGENCY MEDICINE

## 2022-04-14 PROCEDURE — 97110 THERAPEUTIC EXERCISES: CPT | Mod: GP | Performed by: PHYSICAL THERAPIST

## 2022-04-14 PROCEDURE — 250N000011 HC RX IP 250 OP 636: Performed by: RADIOLOGY

## 2022-04-14 PROCEDURE — 74177 CT ABD & PELVIS W/CONTRAST: CPT

## 2022-04-14 PROCEDURE — 86140 C-REACTIVE PROTEIN: CPT | Performed by: EMERGENCY MEDICINE

## 2022-04-14 PROCEDURE — 97750 PHYSICAL PERFORMANCE TEST: CPT | Mod: GP | Performed by: PHYSICAL THERAPIST

## 2022-04-14 PROCEDURE — 258N000003 HC RX IP 258 OP 636: Performed by: EMERGENCY MEDICINE

## 2022-04-14 PROCEDURE — 96360 HYDRATION IV INFUSION INIT: CPT | Mod: 59 | Performed by: EMERGENCY MEDICINE

## 2022-04-14 PROCEDURE — 99285 EMERGENCY DEPT VISIT HI MDM: CPT | Mod: 25 | Performed by: EMERGENCY MEDICINE

## 2022-04-14 PROCEDURE — 36415 COLL VENOUS BLD VENIPUNCTURE: CPT | Performed by: EMERGENCY MEDICINE

## 2022-04-14 PROCEDURE — 99284 EMERGENCY DEPT VISIT MOD MDM: CPT | Performed by: EMERGENCY MEDICINE

## 2022-04-14 PROCEDURE — 85610 PROTHROMBIN TIME: CPT | Performed by: EMERGENCY MEDICINE

## 2022-04-14 PROCEDURE — 94625 PHY/QHP OP PULM RHB W/O MNTR: CPT

## 2022-04-14 PROCEDURE — 250N000009 HC RX 250: Performed by: RADIOLOGY

## 2022-04-14 PROCEDURE — 96361 HYDRATE IV INFUSION ADD-ON: CPT | Performed by: EMERGENCY MEDICINE

## 2022-04-14 PROCEDURE — 82272 OCCULT BLD FECES 1-3 TESTS: CPT | Performed by: EMERGENCY MEDICINE

## 2022-04-14 RX ORDER — IOPAMIDOL 755 MG/ML
500 INJECTION, SOLUTION INTRAVASCULAR ONCE
Status: COMPLETED | OUTPATIENT
Start: 2022-04-14 | End: 2022-04-14

## 2022-04-14 RX ORDER — SODIUM CHLORIDE 9 MG/ML
INJECTION, SOLUTION INTRAVENOUS CONTINUOUS
Status: DISCONTINUED | OUTPATIENT
Start: 2022-04-14 | End: 2022-04-14 | Stop reason: HOSPADM

## 2022-04-14 RX ADMIN — IOPAMIDOL 80 ML: 755 INJECTION, SOLUTION INTRAVENOUS at 10:44

## 2022-04-14 RX ADMIN — SODIUM CHLORIDE 1000 ML: 9 INJECTION, SOLUTION INTRAVENOUS at 10:09

## 2022-04-14 RX ADMIN — SODIUM CHLORIDE 70 ML: 9 INJECTION, SOLUTION INTRAVENOUS at 10:44

## 2022-04-14 NOTE — ED PROVIDER NOTES
"  History     Chief Complaint   Patient presents with     Rectal Bleeding     HPI  Stacie Hair is a 66 year old female who presents to the emergency room for concerns of rectal bleeding.  She says that over the last week has noted intermittent blood on toilet paper when wiping, but has progressively noticed more bright red blood streaked on her stool with bowel movements.  Noticed a long streak today that prompted her to come in.  Is concerned because she is on a blood thinner, is currently taking this due to clots in her lungs.  Had some abdominal bloating and pain on Tuesday that has since resolved.  It got better after she had a bowel movement.  Has not been having any urinary issues.  Denies any nausea or vomiting.  Is not having any chest pain or shortness of breath.  Still intermittently using oxygen, especially at night, but does not feel that her breathing has changed.  Has not been running a fever but does mention \"hot flashes\".    Allergies:  Allergies   Allergen Reactions     Codeine      Eggs [Eggs]      Milk Products      Nsaids      Intolerance       Problem List:    Patient Active Problem List    Diagnosis Date Noted     Cardiomyopathy (H)-LV EF 40-45% 01/28/2022     Priority: Medium     Obesity 01/27/2022     Priority: Medium     Type 2 diabetes mellitus with hyperglycemia, without long-term current use of insulin (H) 01/27/2022     Priority: Medium     Acute respiratory failure with hypoxia (H) 01/26/2022     Priority: Medium     Multiple subsegmental pulmonary emboli without acute cor pulmonale (H) 01/26/2022     Priority: Medium     Pneumonia due to 2019 novel coronavirus 01/26/2022     Priority: Medium     Abnormal transaminases 01/26/2022     Priority: Medium     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 01/26/2022     Priority: Medium     LBBB (left bundle branch block)-chronic 01/26/2022     Priority: Medium     Benign positional vertigo 03/2020     Priority: Medium     " Hyperlipidemia LDL goal <130 2018     Priority: Medium     Hypertension 2017     Priority: Medium     Family history of coronary artery disease 2011     Priority: Medium     Female stress incontinence 10/24/2002     Priority: Medium     Migraines      Priority: Medium        Past Medical History:    Past Medical History:   Diagnosis Date     Benign positional vertigo 3/2020     Herniated discs      Hypertension 2017     Migraines 1972-     Myalgia and myositis, unspecified      Tinnitus        Past Surgical History:    Past Surgical History:   Procedure Laterality Date     COLONOSCOPY  2011    Procedure:COMBINED COLONOSCOPY, SINGLE BIOPSY/POLYPECTOMY BY BIOPSY; Colonoscopy, with polypectomy by biopsy; Surgeon:MADELEINE MONROY; Location:[facility]     HC EXCISION BREAST LESION, OPEN >=1      benign, age 20     ZZC  DELIVERY ONLY      , Transfusion     ZZC LIGATE FALLOPIAN TUBE,POSTPARTUM      Tubal Ligation       Family History:    Family History   Problem Relation Age of Onset     Hypertension Mother      Heart Disease Father         MI       Social History:  Marital Status:   [2]  Social History     Tobacco Use     Smoking status: Former Smoker     Packs/day: 0.00     Years: 0.00     Pack years: 0.00     Smokeless tobacco: Never Used     Tobacco comment: 1980-quit   Vaping Use     Vaping Use: Never used   Substance Use Topics     Alcohol use: Yes     Alcohol/week: 0.0 standard drinks     Comment: occasional     Drug use: No        Medications:    albuterol (PROAIR HFA/PROVENTIL HFA/VENTOLIN HFA) 108 (90 Base) MCG/ACT inhaler  alcohol swab prep pads  blood glucose (NO BRAND SPECIFIED) test strip  blood glucose calibration (NO BRAND SPECIFIED) solution  blood glucose monitoring (NO BRAND SPECIFIED) meter device kit  cyclobenzaprine (FLEXERIL) 10 MG tablet  metFORMIN (GLUCOPHAGE) 500 MG tablet  Omega-3 Fatty Acids (OMEGA 3 PO)  oxazepam (SERAX) 10 MG capsule  STATIN  NOT PRESCRIBED (INTENTIONAL)  thin (NO BRAND SPECIFIED) lancets  XARELTO ANTICOAGULANT 20 MG TABS tablet          Review of Systems   All other systems reviewed and are negative.      Physical Exam   BP: 138/89  Pulse: 83  Resp: 18  Weight: 68.9 kg (152 lb)  SpO2: 97 %      Physical Exam  Vitals and nursing note reviewed. Exam conducted with a chaperone present.   Constitutional:       General: She is not in acute distress.     Appearance: She is not diaphoretic.   HENT:      Head: Atraumatic.      Mouth/Throat:      Pharynx: No oropharyngeal exudate.   Eyes:      General: No scleral icterus.     Pupils: Pupils are equal, round, and reactive to light.   Cardiovascular:      Rate and Rhythm: Normal rate and regular rhythm.      Heart sounds: Normal heart sounds.   Pulmonary:      Effort: Pulmonary effort is normal. No respiratory distress.      Breath sounds: Normal breath sounds.   Abdominal:      General: Bowel sounds are normal. There is no distension.      Palpations: Abdomen is soft.      Tenderness: There is no abdominal tenderness. There is no guarding or rebound.   Genitourinary:     Rectum: Internal hemorrhoid present. No mass, anal fissure or external hemorrhoid. Normal anal tone.   Musculoskeletal:         General: No tenderness.   Skin:     General: Skin is warm.      Findings: No rash.   Neurological:      Mental Status: She is alert.         ED Course                 Procedures              Critical Care time:  none               Results for orders placed or performed during the hospital encounter of 04/14/22 (from the past 24 hour(s))   CBC with platelets differential    Narrative    The following orders were created for panel order CBC with platelets differential.  Procedure                               Abnormality         Status                     ---------                               -----------         ------                     CBC with platelets and d...[[id number]]                      Final  result                 Please view results for these tests on the individual orders.   INR   Result Value Ref Range    INR 1.33 (H) 0.85 - 1.15   Comprehensive metabolic panel   Result Value Ref Range    Sodium 142 133 - 144 mmol/L    Potassium 3.5 3.4 - 5.3 mmol/L    Chloride 110 (H) 94 - 109 mmol/L    Carbon Dioxide (CO2) 28 20 - 32 mmol/L    Anion Gap 4 3 - 14 mmol/L    Urea Nitrogen 15 7 - 30 mg/dL    Creatinine 0.56 0.52 - 1.04 mg/dL    Calcium 9.2 8.5 - 10.1 mg/dL    Glucose 99 70 - 99 mg/dL    Alkaline Phosphatase 71 40 - 150 U/L    AST 18 0 - 45 U/L    ALT 29 0 - 50 U/L    Protein Total 7.7 6.8 - 8.8 g/dL    Albumin 3.8 3.4 - 5.0 g/dL    Bilirubin Total 0.6 0.2 - 1.3 mg/dL    GFR Estimate >90 >60 mL/min/1.73m2   Lipase   Result Value Ref Range    Lipase 98 73 - 393 U/L   CRP inflammation   Result Value Ref Range    CRP Inflammation 26.0 (H) 0.0 - 8.0 mg/L   CBC with platelets and differential   Result Value Ref Range    WBC Count 7.8 4.0 - 11.0 10e3/uL    RBC Count 4.23 3.80 - 5.20 10e6/uL    Hemoglobin 12.7 11.7 - 15.7 g/dL    Hematocrit 38.0 35.0 - 47.0 %    MCV 90 78 - 100 fL    MCH 30.0 26.5 - 33.0 pg    MCHC 33.4 31.5 - 36.5 g/dL    RDW 14.1 10.0 - 15.0 %    Platelet Count 266 150 - 450 10e3/uL    % Neutrophils 70 %    % Lymphocytes 20 %    % Monocytes 7 %    % Eosinophils 3 %    % Basophils 0 %    % Immature Granulocytes 0 %    NRBCs per 100 WBC 0 <1 /100    Absolute Neutrophils 5.4 1.6 - 8.3 10e3/uL    Absolute Lymphocytes 1.6 0.8 - 5.3 10e3/uL    Absolute Monocytes 0.5 0.0 - 1.3 10e3/uL    Absolute Eosinophils 0.2 0.0 - 0.7 10e3/uL    Absolute Basophils 0.0 0.0 - 0.2 10e3/uL    Absolute Immature Granulocytes 0.0 <=0.4 10e3/uL    Absolute NRBCs 0.0 10e3/uL   CT Chest (PE) Abdomen Pelvis w Contrast    Narrative    CT CHEST PE ABDOMEN PELVIS W CONTRAST 4/14/2022 10:54 AM    CLINICAL HISTORY: Abdominal pain, acute, nonlocalized  TECHNIQUE: CT angiogram chest and routine CT abdomen pelvis with  IV  contrast. Arterial phase through the chest and venous phase through  the abdomen and pelvis. 2D and 3D MIP reconstructions were performed  by the CT technologist. Dose reduction techniques were used.     CONTRAST: 80 mL Isovue-370    COMPARISON: Chest CT 1/26/2022.    FINDINGS:  ANGIOGRAM CHEST: Previous bilateral pulmonary emboli have resolved.  Pulmonary arteries are normal caliber and negative for pulmonary  emboli. Thoracic aorta is negative for dissection. No CT evidence of  right heart strain.     LUNGS AND PLEURA: There are mild to moderate diffuse bilateral  groundglass opacities, significantly less in extent and density when  compared to prior exam. No new infiltrate. No fibrosis. Patent central  airways. No pleural effusion.    MEDIASTINUM/AXILLAE: Decreased mediastinal and bilateral hilar lymph  nodes. For example, right hilar lymph node measures 11 mm on this exam  versus 20 mm previously. Normal caliber aorta.    CORONARY ARTERY CALCIFICATION: None.    HEPATOBILIARY: Diffuse hepatic steatosis. No significant mass. No bile  duct dilatation. No calcified gallstones.    PANCREAS: Normal.    SPLEEN: Normal.    ADRENAL GLANDS: Normal.    KIDNEYS/BLADDER: No significant mass, stones, or hydronephrosis. There  are simple or benign cysts. No follow up is needed.    BOWEL: Normal.    LYMPH NODES: Normal.    PELVIC ORGANS: Normal.    OTHER: None.    MUSCULOSKELETAL: Normal.      Impression    IMPRESSION:  1.  Negative for pulmonary embolism. Resolution of previous bilateral  pulmonary embolism.  2.  Significant decrease in the diffuse bilateral groundglass  pulmonary opacities suggesting improving pneumonia.  3.  No acute finding in the abdomen and pelvis.  4.  Diffuse hepatic steatosis.    DANITZA CALDWELL MD         SYSTEM ID:  [id number]   Occult blood stool   Result Value Ref Range    Occult Blood Positive (A) Negative       Medications   0.9% sodium chloride BOLUS (0 mLs Intravenous Stopped 4/14/22 1246)      Followed by   sodium chloride 0.9% infusion (has no administration in time range)   iopamidol (ISOVUE-370) solution 500 mL (80 mLs Intravenous Given by Other Clinician 4/14/22 6944)   new 100 ml saline bag (70 mLs Intravenous Given by Other Clinician 4/14/22 1044)       Assessments & Plan (with Medical Decision Making)  Stacie is a 66-year-old female presenting to the emergency room with rectal bleeding that has occurred intermittently for the last week.  She initially noticed bright red blood on the paper when she wiped, but now noticed a long streak of bright red blood on the stool when she had her last bowel movement.  Has had some abdominal cramping and bloating.  No nausea or vomiting.  No urinary symptoms, no fever.  She is anticoagulated due to diagnosis of COVID-19 with pulmonary emboli.  Vitally stable 66-year-old female in no acute distress, no acute concerning findings on physical exam.  When chaperone was present, was able to perform a rectal exam, did not notice any external thrombosed hemorrhoids or anal fissure that could be contributing to the bleeding.  Fecal occult testing was positive.  Hemoglobin was within normal range.  Patient remained vitally stable.  She was due to have a follow-up chest CT to evaluate pulmonary emboli, this was added onto her work-up in the emergency room today.  Pulmonary emboli have resolved.  She has had improvement of groundglass opacities since her previous imaging.  No acute finding in the abdomen or pelvis  She is updated on these results.  Suspect she has some internal hemorrhoids that are bleeding due to her anticoagulation.  She should follow closely with her primary provider to determine length of time she needs to be on Xarelto and when she can quit.  Offered to refer to general surgery to discuss options for banding versus other procedure for internal hemorrhoids, but she would like to follow-up with her primary provider and obtain a referral later if  needed.  She was due to have pulmonary rehab at 11 AM, but missed that appointment.  We called down and they were able to get her a new appointment at 145.  Will discharge at this time for her to go to rehab.  Return to the emergency room if symptoms worsen or new symptoms develop.  Discharged in no distress     I have reviewed the nursing notes.    I have reviewed the findings, diagnosis, plan and need for follow up with the patient.       Discharge Medication List as of 4/14/2022 12:47 PM          Final diagnoses:   Rectal bleeding   Other hemorrhoids       4/14/2022   [facility] EMERGENCY DEPT     Cherie Almonte DO  04/14/22 8957

## 2022-04-14 NOTE — PROGRESS NOTES
04/14/22 0800   Signing Clinician's Name / Credentials   Signing clinician's name / credentials Lisa Carrero PT   Functional Gait Assessment (ANÍBAL Olivarez, ROSA Carrasquillo, et al. (2004))   1. GAIT LEVEL SURFACE 2  (5.65 sec then 6 sec)   2. CHANGE IN GAIT SPEED 2   3. GAIT WITH HORIZONTAL HEAD TURNS 0   4. GAIT WITH VERTICAL HEAD TURNS 0   5. GAIT AND PIVOT TURN 1  (turns in 2.28 sec but several small steps and LOB self caught at end)   6. STEP OVER OBSTACLE 1   7. GAIT WITH NARROW BASE OF SUPPORT 1  (5 steps)   8. GAIT WITH EYES CLOSED 0  (13.25 sec with assist 20 ft--LOB at 10-15 ft)   9. AMBULATING BACKWARDS 1  (just body sways, unsteady, good path, slow speed)   10. STEPS 2   Total Functional Gait Assessment Score   TOTAL SCORE: (MAXIMUM SCORE 30) 10       Functional Gait Assessment (FGA): The FGA assesses postural stability during various walking tasks.   Gait assistive device used: None    Scores of <22 /30 have been correlated with predicting falls in community-dwelling older adults according to Sher & Michael 2010.   Scores of <18 /30 have been correlated with increased risk for falls in patients with Parkinsons Disease according to Burnette Almendarez, Lares et al 2014.  Minimal Detectable Change for patients with acute/chronic stroke = 4.2 according to Thieme & Ritschel 2009  Minimal Detectable Change for patients with vestibular disorder = 8 according to Sher & Michael 2010    Assessment (rationale for performing, application to patient s function & care plan): Pt has shown good improvement with balance and gait activities, scoring 56/56 on ANTONIO; needing a test with more difficult tasks.  (Minutes billed as physical performance test)  15

## 2022-04-14 NOTE — ED TRIAGE NOTES
Pt presents with concerns of blood in her stool.  Pt states that she noticed it about a week ago.  Pt states that it has increased since then.  Pt complaining of abdominal bloating since Tuesday.  Stool is blood streaked per pt.

## 2022-04-14 NOTE — PROGRESS NOTES
Guzman Balance Scale (BBS) Cutoff Scores: A score of < 45 /56 indicates an increased risk for falls.     Score today 4/14/22 = 56/56    The BBS is a measure of static and dynamic standing balance that has been validated in community dwelling elderly individuals and individuals who have Parkinson's Disease, MS, and those who are s/p CVA and TBI. The test is administered without an assistive device. Scores from the Guzman are used to determine the probability of falling based on the patient's previous history of falls and their test performance.     Minimal Detectable Change = 6.5   & Minimal Detectable Change (Parkinson's Disease) = 5 according to River & Allan 2008    Assessment (rationale for performing, application to patient s function & care plan): comparison and testing for improvement with gait and balance  (Minutes billed as physical performance test) = 20

## 2022-04-14 NOTE — PROGRESS NOTES
04/14/22 0800   Signing Clinician's Name / Credentials   Signing clinician's name / credentials Lisa Carrero PT   Functional Gait Assessment (ANÍBAL Olivarez, ORSA Carrasquillo, et al. (2004))   1. GAIT LEVEL SURFACE 2  (5.65 sec then 6 sec)   2. CHANGE IN GAIT SPEED 2   3. GAIT WITH HORIZONTAL HEAD TURNS 0   4. GAIT WITH VERTICAL HEAD TURNS 0   5. GAIT AND PIVOT TURN 1  (turns in 2.28 sec but several small steps and LOB self caught at end)   6. STEP OVER OBSTACLE 1   7. GAIT WITH NARROW BASE OF SUPPORT 1  (5 steps)   8. GAIT WITH EYES CLOSED 0  (13.25 sec with assist 20 ft--LOB at 10-15 ft)   9. AMBULATING BACKWARDS 1  (just body sways, unsteady, good path, slow speed)   10. STEPS 2   Total Functional Gait Assessment Score   TOTAL SCORE: (MAXIMUM SCORE 30) 10       Functional Gait Assessment (FGA): The FGA assesses postural stability during various walking tasks.   Gait assistive device used: None    Scores of <22 /30 have been correlated with predicting falls in community-dwelling older adults according to Sher & Michael 2010.   Scores of <18 /30 have been correlated with increased risk for falls in patients with Parkinsons Disease according to Burnette Almendarez, Lares et al 2014.  Minimal Detectable Change for patients with acute/chronic stroke = 4.2 according to Thieme & Ritschel 2009  Minimal Detectable Change for patients with vestibular disorder = 8 according to Sher & Michael 2010    Assessment (rationale for performing, application to patient s function & care plan): Pt has shown good improvement with balance and gait activities, scoring 56/56 on ANTONIO; needing a test with more difficult tasks.  (Minutes billed as physical performance test)  20

## 2022-04-14 NOTE — DISCHARGE INSTRUCTIONS
Your lab work all appeared normal.  Your blood counts were normal.    I suspect the blood that you are seeing when you wipe in your stool is due to bleeding internal hemorrhoids.  You can talk to Dr. Schoen about referral to surgery if you would like to have intervention for the bleeding.  However it is possible that when you are able to stop the blood thinner that the bleeding hemorrhoids may stop    The CT scan done of your chest today shows that the blood clots in your lungs have dissolved.  Did not show any worrisome findings on CT scan of your belly, no blockages, no inflammation, no abscess or worrisome cause for the cramping you are experiencing    You should follow-up closely with your primary provider to discuss length of time that you need to be on Xarelto    If you develop any new or worsening symptoms, do not hesitate to return to the emergency room for evaluation    Since you missed your rehab appointment at 11 AM today, they were able to work you in at 145.  Please go directly to rehab

## 2022-04-18 ENCOUNTER — HOSPITAL ENCOUNTER (OUTPATIENT)
Dept: PHYSICAL THERAPY | Facility: CLINIC | Age: 67
Setting detail: THERAPIES SERIES
Discharge: HOME | End: 2022-04-18
Attending: FAMILY MEDICINE
Payer: COMMERCIAL

## 2022-04-18 PROCEDURE — 97110 THERAPEUTIC EXERCISES: CPT | Mod: GP | Performed by: PHYSICAL THERAPIST

## 2022-04-18 PROCEDURE — 97112 NEUROMUSCULAR REEDUCATION: CPT | Mod: GP | Performed by: PHYSICAL THERAPIST

## 2022-04-19 ENCOUNTER — HOSPITAL ENCOUNTER (OUTPATIENT)
Dept: CARDIAC REHAB | Facility: CLINIC | Age: 67
Discharge: HOME | End: 2022-04-19
Attending: FAMILY MEDICINE
Payer: COMMERCIAL

## 2022-04-19 PROCEDURE — 94625 PHY/QHP OP PULM RHB W/O MNTR: CPT | Performed by: REHABILITATION PRACTITIONER

## 2022-04-21 ENCOUNTER — HOSPITAL ENCOUNTER (OUTPATIENT)
Dept: CARDIAC REHAB | Facility: CLINIC | Age: 67
Discharge: HOME | End: 2022-04-21
Attending: FAMILY MEDICINE
Payer: COMMERCIAL

## 2022-04-21 ENCOUNTER — HOSPITAL ENCOUNTER (OUTPATIENT)
Dept: PHYSICAL THERAPY | Facility: CLINIC | Age: 67
Setting detail: THERAPIES SERIES
Discharge: HOME | End: 2022-04-21
Attending: FAMILY MEDICINE
Payer: COMMERCIAL

## 2022-04-21 ENCOUNTER — TELEPHONE (OUTPATIENT)
Dept: FAMILY MEDICINE | Facility: CLINIC | Age: 67
End: 2022-04-21
Payer: COMMERCIAL

## 2022-04-21 PROCEDURE — 94625 PHY/QHP OP PULM RHB W/O MNTR: CPT

## 2022-04-21 PROCEDURE — 97112 NEUROMUSCULAR REEDUCATION: CPT | Mod: GP | Performed by: PHYSICAL THERAPIST

## 2022-04-21 PROCEDURE — 97110 THERAPEUTIC EXERCISES: CPT | Mod: GP | Performed by: PHYSICAL THERAPIST

## 2022-04-21 NOTE — TELEPHONE ENCOUNTER
Please call with the following information: Per my initial orders and request, I asked to do an overnight oxymetry while on her current dosing of oxygen to see if she desaturates while on oxygen.  If she does not, then would like to repeat an overnight test without oxygen before we consider discontinuing. Diagnosis code is U07.1, Pneumonia due to Covid 19.    Electronically signed by Greg Schoen, MD

## 2022-04-21 NOTE — PROGRESS NOTES
[facility]    Outpatient Physical Therapy Progress Note  Patient: Stacie Hair  : 1955    Beginning/End Dates of Reporting Period:  22 to 22    Referring Provider: Dr. Lo Almonte    Therapy Diagnosis: Generalized weakness.  Gait instability     Client Self Report: I had blood in stool this morning and bad cramping yesterday.  I am going upstairs to talk with the nurse after this.    Objective Measurements:  Objective Measure: Antonio.   FGA  Details: ANTONIO prior .   22: 56/56.   Initiated FGA 22: 10/30.  Objective Measure: PRE scale 0-10  Details: middle of stepper workout 4/10.  Objective Measure: symptoms  Details: no SOB.  a little dizzy. No stamina.  Cramping in stomach/low stomach area.  Objective Measure: 2 MWT/6MWT  Details: 22: no AD, pulling her own O2: 199 ft (at end of session)        Goals:  Goal Identifier Antonio   Goal Description Pt will demonstrate Antonio score of 48/56 or better in order to demonstrate significant improvement of balance and reduction of falls risk.   Target Date 22   Date Met  22   Progress (detail required for progress note): 56/56 22.  Will progress this goal to FGA.  NEW GOAL: Pt will demonstrate FGA score of 20/30 (min score for her age) to show improvement and stability/safety with balance and gait for daily activity.  To be met by 6/10/22     Goal Identifier Dizziness   Goal Description Pt will report no more than her pre-COVID baseline dizziness in order to resume ADL's and mobility at prior level of function.   Target Date 22--progress this goal to 6/10/22   Date Met      Progress (detail required for progress note): 22: a little dizzier this morning, but busier this morning.  Taking oxazepam for dizzines. A little more than baseline dizziness today.      Goal Identifier Activity tolerance   Goal  "Description Pt will demonstrate ability to perform ambulation for 10 minutes with no increase in dyspnea in order to establish a walking program to maintain a healthy lifestyle.   Target Date 04/14/22--progress to 6/10/22   Date Met      Progress (detail required for progress note): 4/14/22: not consistent but walked around yard 10-15 minutes, and talked to neighbor.  Still more fatigued.  \"Can't seem to get things done.  Breathing better but no stamina.  It's difficult to get through dishes.\"  It took 2 days to recoup from cleaning kitchen floors with upright .     Plan:  Continue therapy per current plan of care, extending another 8 weeks.  Changes to goals: progressing gait/balance per above.    Discharge:  No  "

## 2022-04-21 NOTE — TELEPHONE ENCOUNTER
Reason for Call:  Other call back    Detailed comments: Benny from [facility] [place] called and he needs the diagnosis code and whether the patient is to use oxygen or not for the over night oximetry order. Once they have those two items they can get her set up. You can call him at [phone] it is a confidential line    Phone Number Patient can be reached at: Other phone number:  [phone]    Best Time: any    Can we leave a detailed message on this number? YES    Call taken on 4/21/2022 at 3:13 PM by Yanni Coley

## 2022-04-22 ENCOUNTER — TRANSFERRED RECORDS (OUTPATIENT)
Dept: HEALTH INFORMATION MANAGEMENT | Facility: CLINIC | Age: 67
End: 2022-04-22

## 2022-04-22 ENCOUNTER — HOSPITAL ENCOUNTER (OUTPATIENT)
Dept: CARDIOLOGY | Facility: CLINIC | Age: 67
Discharge: HOME | End: 2022-04-22
Attending: FAMILY MEDICINE | Admitting: FAMILY MEDICINE
Payer: COMMERCIAL

## 2022-04-22 DIAGNOSIS — I42.9 CARDIOMYOPATHY, UNSPECIFIED TYPE (H): ICD-10-CM

## 2022-04-22 LAB — LVEF ECHO: NORMAL

## 2022-04-22 PROCEDURE — 93321 DOPPLER ECHO F-UP/LMTD STD: CPT | Mod: 26 | Performed by: INTERNAL MEDICINE

## 2022-04-22 PROCEDURE — 93325 DOPPLER ECHO COLOR FLOW MAPG: CPT

## 2022-04-22 PROCEDURE — 93325 DOPPLER ECHO COLOR FLOW MAPG: CPT | Mod: 26 | Performed by: INTERNAL MEDICINE

## 2022-04-22 PROCEDURE — 93308 TTE F-UP OR LMTD: CPT | Mod: 26 | Performed by: INTERNAL MEDICINE

## 2022-04-25 ENCOUNTER — HOSPITAL ENCOUNTER (OUTPATIENT)
Dept: PHYSICAL THERAPY | Facility: CLINIC | Age: 67
Setting detail: THERAPIES SERIES
Discharge: HOME | End: 2022-04-25
Attending: FAMILY MEDICINE
Payer: COMMERCIAL

## 2022-04-25 PROCEDURE — 97110 THERAPEUTIC EXERCISES: CPT | Mod: GP | Performed by: PHYSICAL THERAPIST

## 2022-04-25 PROCEDURE — 97112 NEUROMUSCULAR REEDUCATION: CPT | Mod: GP | Performed by: PHYSICAL THERAPIST

## 2022-04-26 ENCOUNTER — TELEPHONE (OUTPATIENT)
Dept: FAMILY MEDICINE | Facility: CLINIC | Age: 67
End: 2022-04-26
Payer: COMMERCIAL

## 2022-04-26 ENCOUNTER — HOSPITAL ENCOUNTER (OUTPATIENT)
Dept: CARDIAC REHAB | Facility: CLINIC | Age: 67
Discharge: HOME | End: 2022-04-26
Attending: FAMILY MEDICINE
Payer: COMMERCIAL

## 2022-04-26 PROCEDURE — 94625 PHY/QHP OP PULM RHB W/O MNTR: CPT | Performed by: REHABILITATION PRACTITIONER

## 2022-04-26 NOTE — TELEPHONE ENCOUNTER
Trav called and reported they sent the overnight O2 testing results over to PCP.     Zoya Nguyen RN

## 2022-04-29 ENCOUNTER — HOSPITAL ENCOUNTER (OUTPATIENT)
Dept: PHYSICAL THERAPY | Facility: CLINIC | Age: 67
Setting detail: THERAPIES SERIES
Discharge: HOME | End: 2022-04-29
Attending: FAMILY MEDICINE
Payer: COMMERCIAL

## 2022-04-29 PROCEDURE — 97112 NEUROMUSCULAR REEDUCATION: CPT | Mod: GP | Performed by: PHYSICAL THERAPIST

## 2022-04-29 PROCEDURE — 97110 THERAPEUTIC EXERCISES: CPT | Mod: GP | Performed by: PHYSICAL THERAPIST

## 2022-05-03 ENCOUNTER — HOSPITAL ENCOUNTER (OUTPATIENT)
Dept: PHYSICAL THERAPY | Facility: CLINIC | Age: 67
Setting detail: THERAPIES SERIES
Discharge: HOME | End: 2022-05-03
Attending: FAMILY MEDICINE
Payer: COMMERCIAL

## 2022-05-03 PROCEDURE — 97112 NEUROMUSCULAR REEDUCATION: CPT | Mod: GP | Performed by: PHYSICAL THERAPIST

## 2022-05-03 PROCEDURE — 97530 THERAPEUTIC ACTIVITIES: CPT | Mod: GP | Performed by: PHYSICAL THERAPIST

## 2022-05-05 ENCOUNTER — HOSPITAL ENCOUNTER (OUTPATIENT)
Dept: PHYSICAL THERAPY | Facility: CLINIC | Age: 67
Setting detail: THERAPIES SERIES
Discharge: HOME | End: 2022-05-05
Attending: FAMILY MEDICINE
Payer: COMMERCIAL

## 2022-05-05 DIAGNOSIS — R42 VERTIGO: ICD-10-CM

## 2022-05-05 PROCEDURE — 97112 NEUROMUSCULAR REEDUCATION: CPT | Mod: GP | Performed by: PHYSICAL THERAPIST

## 2022-05-05 RX ORDER — OXAZEPAM 10 MG
CAPSULE ORAL
Qty: 40 CAPSULE | Refills: 1 | Status: SHIPPED | OUTPATIENT
Start: 2022-05-05 | End: 2022-07-13

## 2022-05-05 NOTE — TELEPHONE ENCOUNTER
Routing refill request to provider for review/approval because:  Drug not on the [facility] refill protocol     LUPE Franco, RN  [facility]

## 2022-05-06 ENCOUNTER — OFFICE VISIT (OUTPATIENT)
Dept: FAMILY MEDICINE | Facility: CLINIC | Age: 67
End: 2022-05-06
Payer: COMMERCIAL

## 2022-05-06 VITALS
OXYGEN SATURATION: 95 % | SYSTOLIC BLOOD PRESSURE: 112 MMHG | WEIGHT: 153 LBS | TEMPERATURE: 97.8 F | BODY MASS INDEX: 27.98 KG/M2 | HEART RATE: 91 BPM | DIASTOLIC BLOOD PRESSURE: 80 MMHG

## 2022-05-06 DIAGNOSIS — E11.65 TYPE 2 DIABETES MELLITUS WITH HYPERGLYCEMIA, WITHOUT LONG-TERM CURRENT USE OF INSULIN (H): ICD-10-CM

## 2022-05-06 DIAGNOSIS — J12.82 PNEUMONIA DUE TO 2019 NOVEL CORONAVIRUS: Primary | ICD-10-CM

## 2022-05-06 DIAGNOSIS — I26.94 MULTIPLE SUBSEGMENTAL PULMONARY EMBOLI WITHOUT ACUTE COR PULMONALE (H): ICD-10-CM

## 2022-05-06 DIAGNOSIS — G47.34 NOCTURNAL HYPOXIA: ICD-10-CM

## 2022-05-06 DIAGNOSIS — I42.9 CARDIOMYOPATHY, UNSPECIFIED TYPE (H): ICD-10-CM

## 2022-05-06 DIAGNOSIS — U07.1 PNEUMONIA DUE TO 2019 NOVEL CORONAVIRUS: Primary | ICD-10-CM

## 2022-05-06 DIAGNOSIS — K62.5 RECTAL BLEEDING: ICD-10-CM

## 2022-05-06 DIAGNOSIS — Z12.11 SCREENING FOR MALIGNANT NEOPLASM OF COLON: ICD-10-CM

## 2022-05-06 PROCEDURE — 99214 OFFICE O/P EST MOD 30 MIN: CPT | Performed by: FAMILY MEDICINE

## 2022-05-06 ASSESSMENT — PAIN SCALES - GENERAL: PAINLEVEL: NO PAIN (0)

## 2022-05-06 NOTE — PROGRESS NOTES
Assessment & Plan     Pneumonia due to 2019 novel coronavirus  Clinically the patient is doing well overall but still exhibits some nocturnal hypoxia.  For that reason we will continue with nighttime oxygen for another month and can use it as needed during the daytime if she has significant shortness of breath.  She is finding that she is doing well increasingly with her exercise capacity and is not going through pulmonary rehab but does understand the exercises and is doing them at home.  We will make arrangements for follow-up overnight oximetry at the end of May.    Multiple subsegmental pulmonary emboli without acute cor pulmonale (H)  CT scan shows documentation of clearance of these pulmonary emboli and she is now off of anticoagulation and doing well.    Type 2 diabetes mellitus with hyperglycemia, without long-term current use of insulin (H)  Patient discussed her concerns about insulin resistance.  She is taking metformin 500 mg twice daily and this has completely normalized her A1c which indicates that while there may be some insulin resistance it is being managed effectively by metformin and probable dietary change.  We will continue to monitor her A1c and if it remains in a completely normal range we could consider backing off of metformin to see if there was evidence of this rising and a reason to continue.    Cardiomyopathy, unspecified type (H)  Patient did have a follow-up echocardiogram which showed her ejection fraction previously in the 40 to 45% range is now being in the 50 to 55%.  We do not have any prior echocardiogram or other testing that showed an estimated LV EF and this may be her normal baseline.  Given consideration that this is in the low end of normal range we will presume this was a COVID-related stress which she has recovered and for which she seems to have no symptoms.  No need for specific follow-up other than monitoring for symptomatic signs of heart failure.    Rectal  bleeding  Patient is past due follow-up for colonoscopy given previous tubular adenoma polyp.  Although her rectal bleeding is likely associated with her internal hemorrhoids we will encourage her to proceed with colonoscopy at this time as a matter of routine screening follow-up as well as complete evaluation of her recent rectal bleeding.      Return in about 3 months (around 8/6/2022) for in person.    Gregory G. Schoen, MD  [facility]          Molly Quinones is a 66 year old who presents for the following health issues     Stacie is improving as she recovers from COVID.  She ended up in the emergency room on 4/14/2022 with issues related to rectal bleeding and along with some abdominal bloating.  She was still on her anticoagulation for her COVID prophylaxis and treatment of her subsegmental pulmonary emboli at that time.  She noticed blood with wiping but was not mixed in with the stool.  She did have a colonoscopy in 2011 with a benign adenomatous polyp and recommendation for 5-year follow-up but has declined to do so up to this point.  She was noticed to have internal hemorrhoids on her exam in the emergency department.  She ended up having a CT scan at that visit which did include the chest abdomen and pelvis and as such was able to identify that she had complete resolution of her pulmonary emboli and we have subsequently discontinued her anticoagulation.  She continues to do fine off of that medication without increasing shortness of breath.  CT scan otherwise continue to show evidence of fatty liver and the patient has altered her diet and is taking supplements that she prefers that are focused on liver health and wishes to continue taking that and not pursuing further work-up or evaluation for the fatty liver change that is noted.  She would need some follow-up imaging in about 6 months which we may be able to accomplish with an ultrasound rather than another CT  scan.    Regarding her breathing status she did have overnight oximetry completed which was done off of oxygen and she did have desaturations showing that she spent about 75 minutes with an oxygen saturation below 89% with a maci saturation of 73%.  Based on this she is still exhibiting nocturnal hypoxia I would recommend that she continue with oxygen at night.  The study was performed overnight on 4/23/2022.    She does report that she is having symptoms of brain fog but that is getting better as time goes along.          HPI   Review of Systems   CONSTITUTIONAL: NEGATIVE for fever, chills, change in weight  INTEGUMENTARY/SKIN: NEGATIVE for worrisome rashes, moles or lesions  ENT/MOUTH: NEGATIVE for ear, mouth and throat problems  RESP: NEGATIVE for significant cough or SOB  CV: NEGATIVE for chest pain, palpitations or peripheral edema  GI: NEGATIVE for nausea, abdominal pain, heartburn, or change in bowel habits  : NEGATIVE for frequency, dysuria, or hematuria  HEME: see above regarding bout of rectal bleeding.  PSYCHIATRIC: NEGATIVE for changes in mood or affect      Objective    LMP 05/13/2004   Body mass index is 27.98 kg/m .   /80 (Cuff Size: Adult Regular)   Pulse 91   Temp 97.8  F (36.6  C) (Temporal)   Wt 69.4 kg (153 lb)   LMP 05/13/2004   SpO2 95%   BMI 27.98 kg/m      Physical Exam   GENERAL: healthy, alert and no distress  RESP: lungs clear to auscultation - no rales, rhonchi or wheezes  CV: regular rate and rhythm, normal S1 S2, no S3 or S4, no murmur, click or rub, no peripheral edema and peripheral pulses strong  MS: no gross musculoskeletal defects noted, no edema

## 2022-05-09 ENCOUNTER — HOSPITAL ENCOUNTER (OUTPATIENT)
Dept: PHYSICAL THERAPY | Facility: CLINIC | Age: 67
Setting detail: THERAPIES SERIES
Discharge: HOME | End: 2022-05-09
Attending: FAMILY MEDICINE
Payer: COMMERCIAL

## 2022-05-09 PROCEDURE — 97112 NEUROMUSCULAR REEDUCATION: CPT | Mod: GP | Performed by: PHYSICAL THERAPIST

## 2022-05-10 PROBLEM — I42.9 CARDIOMYOPATHY (H): Status: RESOLVED | Noted: 2022-01-28 | Resolved: 2022-05-10

## 2022-05-10 PROBLEM — J96.01 ACUTE RESPIRATORY FAILURE WITH HYPOXIA (H): Status: RESOLVED | Noted: 2022-01-26 | Resolved: 2022-05-10

## 2022-05-11 ENCOUNTER — HOSPITAL ENCOUNTER (OUTPATIENT)
Dept: PHYSICAL THERAPY | Facility: CLINIC | Age: 67
Setting detail: THERAPIES SERIES
Discharge: HOME | End: 2022-05-11
Attending: FAMILY MEDICINE
Payer: COMMERCIAL

## 2022-05-11 PROCEDURE — 97112 NEUROMUSCULAR REEDUCATION: CPT | Mod: GP | Performed by: PHYSICAL THERAPIST

## 2022-05-12 ENCOUNTER — TELEPHONE (OUTPATIENT)
Dept: FAMILY MEDICINE | Facility: CLINIC | Age: 67
End: 2022-05-12
Payer: COMMERCIAL

## 2022-05-12 NOTE — TELEPHONE ENCOUNTER
----- Message from Gregory G Schoen, MD sent at 5/10/2022  9:29 AM CDT -----  Hi,  Please contact patient to let her know that I put in a referral for a colonoscopy for follow up of the rectal bleeding she recently had as well as it has been 10 years since her last colonoscopy.  I also put in a referral for overnight oximetry to be done in two weeks.  I am not sure how that gets scheduled but with the previous order she was contacted by two different agencies to complete the test.  We should have the same agency do the second test on her.   Electronically signed by Greg Schoen, MD

## 2022-05-12 NOTE — TELEPHONE ENCOUNTER
Note order for overnight oximetry has been printed and faxed to [facility] as previously completed through them. Note last completed on 4/22/22. Note GI has reached out to patient with scheduling of the colonoscopy. Did reach out to patient and informed her of providers message as noted. Informed of order being faxed to [facility] and that they would reach out to her. Patient stating she did get message that she will follow up on with scheduling of the colonoscopy. Cyndi Thomas LPN

## 2022-05-15 ENCOUNTER — TRANSFERRED RECORDS (OUTPATIENT)
Dept: HEALTH INFORMATION MANAGEMENT | Facility: CLINIC | Age: 67
End: 2022-05-15
Payer: COMMERCIAL

## 2022-05-16 ENCOUNTER — TELEPHONE (OUTPATIENT)
Dept: FAMILY MEDICINE | Facility: CLINIC | Age: 67
End: 2022-05-16
Payer: COMMERCIAL

## 2022-05-16 ENCOUNTER — HOSPITAL ENCOUNTER (OUTPATIENT)
Dept: PHYSICAL THERAPY | Facility: CLINIC | Age: 67
Setting detail: THERAPIES SERIES
Discharge: HOME | End: 2022-05-16
Attending: FAMILY MEDICINE
Payer: COMMERCIAL

## 2022-05-16 PROCEDURE — 97112 NEUROMUSCULAR REEDUCATION: CPT | Mod: GP | Performed by: PHYSICAL THERAPIST

## 2022-05-16 PROCEDURE — 97110 THERAPEUTIC EXERCISES: CPT | Mod: GP | Performed by: PHYSICAL THERAPIST

## 2022-05-16 NOTE — TELEPHONE ENCOUNTER
Stevie calling to follow up on DME orders for patients oxygen renewal that she pended and sent to provider. Note provider was unable to locate the DME order in patients chart that was pended. Stevie is going to fax over the order for provider to sign and fax back. Cyndi Thomas LPN

## 2022-05-16 NOTE — TELEPHONE ENCOUNTER
Please notify that I received a random text page last week asking to sign pended orders as soon as possible.  There was no information on who the sender was and who the patient was.  This is not effective communication.  As stated, I have tried to locate this order in [facility] to sign and am unable to find any.   Electronically signed by Greg Schoen, MD

## 2022-05-16 NOTE — TELEPHONE ENCOUNTER
Paper order signed today and placed in basket in  work station.   Electronically signed by Greg Schoen, MD

## 2022-05-16 NOTE — TELEPHONE ENCOUNTER
Writer did inform Stevie that provider was unable to see and locate the DME order in the chart to be signed. She is faxing the form/order to the clinic to be signed and faxed back. Cyndi Thomas LPN

## 2022-05-19 ENCOUNTER — HOSPITAL ENCOUNTER (OUTPATIENT)
Dept: PHYSICAL THERAPY | Facility: CLINIC | Age: 67
Setting detail: THERAPIES SERIES
Discharge: HOME | End: 2022-05-19
Attending: FAMILY MEDICINE
Payer: COMMERCIAL

## 2022-05-19 PROCEDURE — 97112 NEUROMUSCULAR REEDUCATION: CPT | Mod: GP | Performed by: PHYSICAL THERAPIST

## 2022-05-23 ENCOUNTER — HOSPITAL ENCOUNTER (OUTPATIENT)
Dept: PHYSICAL THERAPY | Facility: CLINIC | Age: 67
Setting detail: THERAPIES SERIES
Discharge: HOME | End: 2022-05-23
Attending: FAMILY MEDICINE
Payer: COMMERCIAL

## 2022-05-23 PROCEDURE — 97112 NEUROMUSCULAR REEDUCATION: CPT | Mod: GP | Performed by: PHYSICAL THERAPIST

## 2022-05-26 ENCOUNTER — HOSPITAL ENCOUNTER (OUTPATIENT)
Dept: PHYSICAL THERAPY | Facility: CLINIC | Age: 67
Setting detail: THERAPIES SERIES
Discharge: HOME | End: 2022-05-26
Attending: FAMILY MEDICINE
Payer: COMMERCIAL

## 2022-05-26 PROCEDURE — 97530 THERAPEUTIC ACTIVITIES: CPT | Mod: GP | Performed by: PHYSICAL THERAPIST

## 2022-05-26 PROCEDURE — 97112 NEUROMUSCULAR REEDUCATION: CPT | Mod: GP | Performed by: PHYSICAL THERAPIST

## 2022-05-31 ENCOUNTER — HOSPITAL ENCOUNTER (OUTPATIENT)
Dept: PHYSICAL THERAPY | Facility: CLINIC | Age: 67
Setting detail: THERAPIES SERIES
Discharge: HOME | End: 2022-05-31
Attending: FAMILY MEDICINE
Payer: COMMERCIAL

## 2022-05-31 PROCEDURE — 97112 NEUROMUSCULAR REEDUCATION: CPT | Mod: GP | Performed by: PHYSICAL THERAPIST

## 2022-06-06 ENCOUNTER — HOSPITAL ENCOUNTER (OUTPATIENT)
Dept: PHYSICAL THERAPY | Facility: CLINIC | Age: 67
Setting detail: THERAPIES SERIES
Discharge: HOME | End: 2022-06-06
Attending: FAMILY MEDICINE
Payer: COMMERCIAL

## 2022-06-06 PROCEDURE — 97112 NEUROMUSCULAR REEDUCATION: CPT | Mod: GP | Performed by: PHYSICAL THERAPIST

## 2022-06-06 PROCEDURE — 97530 THERAPEUTIC ACTIVITIES: CPT | Mod: GP | Performed by: PHYSICAL THERAPIST

## 2022-06-09 ENCOUNTER — HOSPITAL ENCOUNTER (OUTPATIENT)
Dept: PHYSICAL THERAPY | Facility: CLINIC | Age: 67
Setting detail: THERAPIES SERIES
Discharge: HOME | End: 2022-06-09
Attending: FAMILY MEDICINE
Payer: COMMERCIAL

## 2022-06-09 PROCEDURE — 97530 THERAPEUTIC ACTIVITIES: CPT | Mod: GP | Performed by: PHYSICAL THERAPIST

## 2022-06-09 PROCEDURE — 97112 NEUROMUSCULAR REEDUCATION: CPT | Mod: GP | Performed by: PHYSICAL THERAPIST

## 2022-06-13 ENCOUNTER — HOSPITAL ENCOUNTER (OUTPATIENT)
Dept: PHYSICAL THERAPY | Facility: CLINIC | Age: 67
Setting detail: THERAPIES SERIES
Discharge: HOME | End: 2022-06-13
Attending: FAMILY MEDICINE
Payer: COMMERCIAL

## 2022-06-13 PROCEDURE — 97112 NEUROMUSCULAR REEDUCATION: CPT | Mod: GP | Performed by: PHYSICAL THERAPIST

## 2022-06-13 PROCEDURE — 97110 THERAPEUTIC EXERCISES: CPT | Mod: GP | Performed by: PHYSICAL THERAPIST

## 2022-06-16 ENCOUNTER — HOSPITAL ENCOUNTER (OUTPATIENT)
Dept: PHYSICAL THERAPY | Facility: CLINIC | Age: 67
Setting detail: THERAPIES SERIES
Discharge: HOME | End: 2022-06-16
Attending: FAMILY MEDICINE
Payer: COMMERCIAL

## 2022-06-16 PROCEDURE — 97110 THERAPEUTIC EXERCISES: CPT | Mod: GP | Performed by: PHYSICAL THERAPIST

## 2022-06-16 PROCEDURE — 97750 PHYSICAL PERFORMANCE TEST: CPT | Mod: GP | Performed by: PHYSICAL THERAPIST

## 2022-06-16 NOTE — PROGRESS NOTES
06/16/22 0900   Signing Clinician's Name / Credentials   Signing clinician's name / credentials Lisa Carrero PT   Functional Gait Assessment (ANÍBAL Olivarez, ROSA Carrasquillo, et al. (2004))   1. GAIT LEVEL SURFACE 2  (5.56 sec was faster and a fast speed for her)   2. CHANGE IN GAIT SPEED 2   3. GAIT WITH HORIZONTAL HEAD TURNS 2   4. GAIT WITH VERTICAL HEAD TURNS 2   5. GAIT AND PIVOT TURN 2  (mild imbalance)   6. STEP OVER OBSTACLE 3   7. GAIT WITH NARROW BASE OF SUPPORT 3   8. GAIT WITH EYES CLOSED 0  (18.47 sec and about 4-5 ft off course)   9. AMBULATING BACKWARDS 2  (slower speed)   10. STEPS 2   Total Functional Gait Assessment Score   TOTAL SCORE: (MAXIMUM SCORE 30) 20     Functional Gait Assessment (FGA): The FGA assesses postural stability during various walking tasks.   Gait assistive device used: None    Scores of <22 /30 have been correlated with predicting falls in community-dwelling older adults according to Sher & Michael 2010.   Scores of <18 /30 have been correlated with increased risk for falls in patients with Parkinsons Disease according to Erickson Burnette Zhou et al 2014.  Minimal Detectable Change for patients with acute/chronic stroke = 4.2 according to Thiguillermo & Ritschel 2009  Minimal Detectable Change for patients with vestibular disorder = 8 according to Sher Rodriguez 2010    Assessment (rationale for performing, application to patient s function & care plan): comparison for balance improvement  (Minutes billed as physical performance test) = 25

## 2022-07-05 NOTE — PROGRESS NOTES
[facility]    Outpatient Physical Therapy Discharge Note  Patient: Stacie Hair  : 1955    Beginning/End Dates of Reporting Period:  22 to 22    Referring Provider: Dr Lo Almonte    Therapy Diagnosis: Generalized weakness.  Gait instability     Client Self Report: Good.    Objective Measurements:  Objective Measure: Antonio.   FGA  Details: ANTONIO prior 39/56.   22: 56/56.   Initiated FGA 22: 10/30.  see goals         Goals:  Goal Identifier Antonio   Goal Description Pt will demonstrate Antonio score of 48/56 or better in order to demonstrate significant improvement of balance and reduction of falls risk.   Target Date 22   Date Met  22   Progress (detail required for progress note): 5622.  Will progress this goal to FGA.     Goal Identifier Dizziness   Goal Description Pt will report no more than her pre-COVID baseline dizziness in order to resume ADL's and mobility at prior level of function.   Target Date 06/10/22   Date Met      Progress (detail required for progress note): 22: a little more dizzy today than she has been, but had a difficult time sleeping--woke and had to take care of dog.   22: most of the time at baseline but increases in mornings without meds.  22: a little dizzier this morning, but busier this morning.  Taking oxazepam for dizzines. A little more than baseline dizziness today.     Goal Identifier Activity tolerance   Goal Description Pt will demonstrate ability to perform ambulation for 10 minutes with no increase in dyspnea in order to establish a walking program to maintain a healthy lifestyle.   Target Date 06/10/22   Date Met  22   Progress (detail required for progress note): 22:  total activity full session today, more than 10 min on stepper to start--no sitting rests.  5/3/22: 6MWT = 1183 ft--some SOB  "afterwards.  4/14/22: not consistent but walked around yard 10-15 minutes, and talked to neighbor.  Still more fatigued.  \"Can't seem to get things done.  Breathing better but no stamina.  It's difficult to get through dishes.\"  It took 2 days to recoup from cleaning kitchen floors with upright .     Goal Identifier FGA   Goal Description Pt will demonstrate FGA score of 20/30 (min score for her age) to show improvement and stability/safety with balance and gait for daily activity.   Target Date 06/10/22   Date Met  06/16/22   Progress (detail required for progress note): 6/16/22: 20/30     Plan:  Discharge from therapy.    Discharge:    Reason for Discharge: Patient has met goals and will continue to improve with established HEP.     Equipment Issued: .    Discharge Plan: Patient to continue home program.  " oral

## 2022-07-12 DIAGNOSIS — R42 VERTIGO: ICD-10-CM

## 2022-07-13 RX ORDER — OXAZEPAM 10 MG
CAPSULE ORAL
Qty: 40 CAPSULE | Refills: 1 | Status: SHIPPED | OUTPATIENT
Start: 2022-07-13 | End: 2022-09-27

## 2022-07-13 NOTE — TELEPHONE ENCOUNTER
Pending Prescriptions:                       Disp   Refills    oxazepam (SERAX) 10 MG capsule [Pharmacy M*40 cap*1        Sig: TAKE 1 CAPSULE BY MOUTH TWO TIMES A DAY AS NEEDED FOR           ANXIETY (VERTIGO)        Last Written Prescription Date:  5/5/2022  Last Fill Quantity: 40,   # refills: 1  Last Office Visit: 5/6/2022  Future Office visit:    Next 5 appointments (look out 90 days)      Aug 08, 2022  2:50 PM  (Arrive by 2:30 PM)  Provider Visit with Gregory G Schoen, MD  [facility] ([facility] ) [address] [place] [id number] [phone]             Routing refill request to provider for review/approval because:  Drug not on the [facility], [facility] or [facility] refill protocol or controlled substance    Dick Copeland RN

## 2022-07-30 ENCOUNTER — HEALTH MAINTENANCE LETTER (OUTPATIENT)
Age: 67
End: 2022-07-30

## 2022-08-15 ENCOUNTER — OFFICE VISIT (OUTPATIENT)
Dept: FAMILY MEDICINE | Facility: CLINIC | Age: 67
End: 2022-08-15
Payer: COMMERCIAL

## 2022-08-15 VITALS
TEMPERATURE: 97.4 F | DIASTOLIC BLOOD PRESSURE: 84 MMHG | WEIGHT: 157 LBS | RESPIRATION RATE: 16 BRPM | SYSTOLIC BLOOD PRESSURE: 116 MMHG | OXYGEN SATURATION: 100 % | HEART RATE: 87 BPM | BODY MASS INDEX: 28.72 KG/M2

## 2022-08-15 DIAGNOSIS — Z78.0 ASYMPTOMATIC POSTMENOPAUSAL STATUS: ICD-10-CM

## 2022-08-15 DIAGNOSIS — J12.82 PNEUMONIA DUE TO 2019 NOVEL CORONAVIRUS: Primary | ICD-10-CM

## 2022-08-15 DIAGNOSIS — E11.65 TYPE 2 DIABETES MELLITUS WITH HYPERGLYCEMIA, WITHOUT LONG-TERM CURRENT USE OF INSULIN (H): ICD-10-CM

## 2022-08-15 DIAGNOSIS — I10 HYPERTENSION, UNSPECIFIED TYPE: ICD-10-CM

## 2022-08-15 DIAGNOSIS — U07.1 PNEUMONIA DUE TO 2019 NOVEL CORONAVIRUS: Primary | ICD-10-CM

## 2022-08-15 DIAGNOSIS — E78.5 HYPERLIPIDEMIA LDL GOAL <130: ICD-10-CM

## 2022-08-15 DIAGNOSIS — Z12.31 VISIT FOR SCREENING MAMMOGRAM: ICD-10-CM

## 2022-08-15 PROCEDURE — 99214 OFFICE O/P EST MOD 30 MIN: CPT | Performed by: FAMILY MEDICINE

## 2022-08-15 ASSESSMENT — PAIN SCALES - GENERAL: PAINLEVEL: NO PAIN (0)

## 2022-08-15 NOTE — PROGRESS NOTES
"  Assessment & Plan     Pneumonia due to 2019 novel coronavirus  Overall is doing much better clinically but still short of breath with activity.  Sats now 100% here in clinic. Should be find stopping oxygen now for oximetry overnight study.  Order placed.  If this comes back without hypoxia, can stop the oxygen.   - Overnight oximetry study; Future    Type 2 diabetes mellitus with hyperglycemia, without long-term current use of insulin (H)  A1c improved on metformin and also has changed diet. She remains torn on whether she should continue for prevention or go off and see how she can do with diet control alone.  Discussed that either is an option and she will consider and let me know, but for now should leave this on her medication list.     Hypertension, unspecified type  Blood pressures look very good at this time off BP meds.  Continue to monitor.     Visit for screening mammogram  Agrees to follow up mammogram; order placed.   - MA Screen Bilateral w/Shun; Future    Asymptomatic postmenopausal status  Agrees to dexa scan, order placed.   - DEXA HIP/PELVIS/SPINE - Future; Future    Hyperlipidemia LDL goal <130  Will check lipid panel and determine if need to consider statin therapy.         BMI:   Estimated body mass index is 28.72 kg/m  as calculated from the following:    Height as of 1/26/22: 1.575 m (5' 2\").    Weight as of this encounter: 71.2 kg (157 lb).   Weight management plan: Discussed healthy diet and exercise guidelines        Return in about 6 months (around 2/15/2023) for in person.    Gregory G. Schoen, MD  [facility]    Molly Quinones is a 67 year old, presenting for the following health issues:  Follow Up (Covid/)      HPI     Follow up from covid.  States she is doing better but still has shortness of breath and fatigue.  She is a little frustrated over gaining back weight as she does feel limited in her activity.  Her prior CT showed that her PE had cleared as " well as much of the covid inflammation in her lung parenchyma and her echocardiogram showed her EF to be restored to 50-55%.  She was showing moderate desaturations at night with oxygen on last overnight oximetry back in May so is still using at night.  She was noted to have 100% sats here today.     Off metformin for about two weeks as she wondered if she still needed to take it if she was doing well enough on diet alone..  While on it, FBS was 80s-90s. Now that off of it, is running slightly over 100 but less than 105.  In review of prior A1c levels, she did go as high as 6.5% but came back to normal range with metformin.     She continues to use oxazepam for anxiety but states it is only once a day on most days. In reviewing her refill pattern in PDMP, she is using 40 tabs in 30-60 days, typically closer to the low 30 day gap.  She has some concerns about reliance/dependence but notes that she is much more able to function effectively at a low dose.      Has been experiencing a burning sensation in her chest if she drinks her caffeinated coffee in the am with her meds.  She typically would not eat in the AM but has found her symptoms to be better if she does eat a little something.  She also notes she doesn't drink soda but does drink sparkling water throughout the day and hasn't noted the same impact with that.  She doesn't each much dairy as she has low tolerance and does take a probiotic.      Care gaps show she is due for Dexa, Mammogram and colonoscopy as well as several vaccines to consider, including shingles, pneumococcal 20 valent, TDAP.  We discussed and wishes to proceed with the dexa and mammogram but wants to think about colonoscopy. She is reminded that she had a tubular adenoma and recommendation is to due a follow up scope in 5 years, which would have been 2016 but is delayed.  She is aware that FIT and Cologard do not detect precancerous lesions and someone with known history of polyps is best  followed with colonoscopy to remove precancerous lesions.  She declines shingrix and notes she had 11 siblings and they all got chicken pox but she did not, same with measles and mumps.  I suggested she might have had a subclinical infection and could still be at risk for shingles and we could check aubree levels but she declined testing and vaccination.        Review of Systems   CONSTITUTIONAL: NEGATIVE for fever, chills, some increase in weight (still down about 20 pounds from pre-covid weight).   INTEGUMENTARY/SKIN: NEGATIVE for worrisome rashes, moles or lesions  EYES: NEGATIVE for vision changes or irritation  ENT/MOUTH: NEGATIVE for ear, mouth and throat problems  RESP: NEGATIVE for significant cough or SOB  CV: NEGATIVE for chest pain, palpitations or peripheral edema  GI: positive for heartburn, no change in bowel habits  : NEGATIVE  MUSCULOSKELETAL: NEGATIVE for significant arthralgias or myalgia  NEURO: NEGATIVE for weakness, dizziness or paresthesias  ENDOCRINE: NEGATIVE for temperature intolerance, skin/hair changes  HEME: NEGATIVE for bleeding problems  PSYCHIATRIC: NEGATIVE for changes in mood or affect      Objective    /84 (Cuff Size: Adult Regular)   Pulse 87   Temp 97.4  F (36.3  C) (Temporal)   Wt 71.2 kg (157 lb)   LMP 05/13/2004   SpO2 100%   BMI 28.72 kg/m    Body mass index is 28.72 kg/m .  Physical Exam   GENERAL: healthy, alert and no distress  HENT: ear canals and TM's normal, nose and mouth without ulcers or lesions  NECK: no adenopathy, no asymmetry, masses, or scars and thyroid normal to palpation  RESP: lungs clear to auscultation - no rales, rhonchi or wheezes  CV: regular rate and rhythm, normal S1 S2, no S3 or S4, no murmur, click or rub, no peripheral edema and peripheral pulses strong  MS: no gross musculoskeletal defects noted, no edema            .  ..

## 2022-08-16 PROBLEM — I26.94 MULTIPLE SUBSEGMENTAL PULMONARY EMBOLI WITHOUT ACUTE COR PULMONALE (H): Status: RESOLVED | Noted: 2022-01-26 | Resolved: 2022-08-16

## 2022-08-17 ENCOUNTER — HOSPITAL ENCOUNTER (OUTPATIENT)
Dept: BONE DENSITY | Facility: CLINIC | Age: 67
Discharge: HOME | End: 2022-08-17
Attending: FAMILY MEDICINE
Payer: COMMERCIAL

## 2022-08-17 ENCOUNTER — HOSPITAL ENCOUNTER (OUTPATIENT)
Dept: MAMMOGRAPHY | Facility: CLINIC | Age: 67
Discharge: HOME | End: 2022-08-17
Attending: FAMILY MEDICINE
Payer: COMMERCIAL

## 2022-08-17 DIAGNOSIS — Z12.31 VISIT FOR SCREENING MAMMOGRAM: ICD-10-CM

## 2022-08-17 DIAGNOSIS — Z78.0 ASYMPTOMATIC POSTMENOPAUSAL STATUS: ICD-10-CM

## 2022-08-17 PROCEDURE — 77080 DXA BONE DENSITY AXIAL: CPT

## 2022-08-17 PROCEDURE — 77067 SCR MAMMO BI INCL CAD: CPT

## 2022-09-24 DIAGNOSIS — R42 VERTIGO: ICD-10-CM

## 2022-09-27 RX ORDER — OXAZEPAM 10 MG
CAPSULE ORAL
Qty: 40 CAPSULE | Refills: 1 | Status: SHIPPED | OUTPATIENT
Start: 2022-09-27 | End: 2022-11-27

## 2022-09-27 NOTE — TELEPHONE ENCOUNTER
Routing refill request to provider for review/approval because:    Requested Prescriptions   Pending Prescriptions Disp Refills    oxazepam (SERAX) 10 MG capsule [Pharmacy Med Name: OXAZEPAM 10MG CAPS] 40 capsule 1     Sig: TAKE 1 CAPSULE BY MOUTH TWO TIMES A DAY AS NEEDED FOR ANXIETY (VERTIGO)        There is no refill protocol information for this order

## 2022-10-09 ENCOUNTER — HEALTH MAINTENANCE LETTER (OUTPATIENT)
Age: 67
End: 2022-10-09

## 2022-10-11 ENCOUNTER — MYC MEDICAL ADVICE (OUTPATIENT)
Dept: FAMILY MEDICINE | Facility: CLINIC | Age: 67
End: 2022-10-11

## 2022-10-11 DIAGNOSIS — U07.1 PNEUMONIA DUE TO 2019 NOVEL CORONAVIRUS: Primary | ICD-10-CM

## 2022-10-11 DIAGNOSIS — J12.82 PNEUMONIA DUE TO 2019 NOVEL CORONAVIRUS: Primary | ICD-10-CM

## 2022-10-11 NOTE — TELEPHONE ENCOUNTER
Dr. Schoen did order the overnight pulse oximetry test. Please assist patient.     Ivana Baez, RN, BSN

## 2022-10-12 NOTE — TELEPHONE ENCOUNTER
Hello,  I had placed an order and now again placed another order.  Please check into whatever needs to be done to have this completed.   Electronically signed by Greg Schoen, MD

## 2022-10-18 NOTE — TELEPHONE ENCOUNTER
Left message to return call and give me the fax number to fax the order.     Years of education: None    Highest education level: None   Occupational History    None   Social Needs    Financial resource strain: None    Food insecurity     Worry: None     Inability: None    Transportation needs     Medical: None     Non-medical: None   Tobacco Use    Smoking status: Never Smoker    Smokeless tobacco: Never Used   Substance and Sexual Activity    Alcohol use: Never     Frequency: Never    Drug use: None    Sexual activity: None   Lifestyle    Physical activity     Days per week: None     Minutes per session: None    Stress: None   Relationships    Social connections     Talks on phone: None     Gets together: None     Attends Judaism service: None     Active member of club or organization: None     Attends meetings of clubs or organizations: None     Relationship status: None    Intimate partner violence     Fear of current or ex partner: None     Emotionally abused: None     Physically abused: None     Forced sexual activity: None   Other Topics Concern    None   Social History Narrative    None     No current outpatient medications on file. No current facility-administered medications for this visit. No current outpatient medications on file prior to visit. No current facility-administered medications on file prior to visit.       No Known Allergies  Immunization History   Administered Date(s) Administered    Hepatitis B Ped/Adol (Engerix-B, Recombivax HB) 2020         Gestational Age: 36w0d, Delivery Method: , Low Transverse,    Birth Weight: 10 lb 0.9 oz (4.56 kg)  Birthweight yvlril10%   Birth Length: 1' 8\" (0.508 m) Birth Head Circumference: 38.1 cm (15\")   APGAR One: 8, APGAR Five: 9      ROS  Constitutional: negative  Eyes: negative  Ears, nose, mouth, throat, and face: negative  Respiratory: negative  Cardiovascular: negative  Gastrointestinal: negative  Genitourinary:negative  Hematologic/lymphatic: negative  Neurological: negative  Behavioral/Psych: negative     Objective:          Vitals:    07/02/20 1525   Pulse: 100   Resp: 26   Temp: 97.8 °F (36.6 °C)   TempSrc: Axillary   Weight: 11 lb 4.3 oz (5.11 kg)   Height: 22\" (55.9 cm)   HC: 36.8 cm (14.5\")     Body mass index is 16.36 kg/m². Wt Readings from Last 3 Encounters:   07/02/20 11 lb 4.3 oz (5.11 kg) (90 %, Z= 1.29)*   06/09/20 9 lb 14.7 oz (4.5 kg) (96 %, Z= 1.70)*   06/02/20 9 lb 3.8 oz (4.19 kg) (95 %, Z= 1.63)*     * Growth percentiles are based on WHO (Girls, 0-2 years) data. BP Readings from Last 3 Encounters:   05/29/20 64/35      Wt Readings from Last 3 Encounters:   07/02/20 11 lb 4.3 oz (5.11 kg) (90 %, Z= 1.29)*   06/09/20 9 lb 14.7 oz (4.5 kg) (96 %, Z= 1.70)*   06/02/20 9 lb 3.8 oz (4.19 kg) (95 %, Z= 1.63)*     * Growth percentiles are based on WHO (Girls, 0-2 years) data. Ht Readings from Last 3 Encounters:   07/02/20 22\" (55.9 cm) (82 %, Z= 0.92)*   06/02/20 20.08\" (51 cm) (75 %, Z= 0.67)*   05/29/20 20\" (50.8 cm) (81 %, Z= 0.89)*     * Growth percentiles are based on WHO (Girls, 0-2 years) data. Body mass index is 16.36 kg/m². 87 %ile (Z= 1.14) based on WHO (Girls, 0-2 years) BMI-for-age based on BMI available as of 2020.  90 %ile (Z= 1.29) based on WHO (Girls, 0-2 years) weight-for-age data using vitals from 2020.  82 %ile (Z= 0.92) based on WHO (Girls, 0-2 years) Length-for-age data based on Length recorded on 2020. Growth parameters are noted and are appropriate for age. General:   alert, appears stated age and cooperative   Skin:   normal fading rash in diaper area   Head:   normal fontanelles, normal appearance, normal palate and supple neck   Eyes:   sclerae white, pupils equal and reactive, red reflex normal bilaterally   Ears:   normal bilaterally   Mouth:   No perioral or gingival cyanosis or lesions. Tongue is normal in appearance.    Lungs:   clear to auscultation bilaterally   Heart:   regular rate and rhythm, S1, S2 normal, no murmur, click, rub or gallop   Abdomen:   soft, non-tender; bowel sounds normal; no masses,  no organomegaly   Screening DDH:   Ortolani's and Morales's signs absent bilaterally, leg length symmetrical and thigh & gluteal folds symmetrical   :   normal female   Femoral pulses:   present bilaterally   Extremities:   extremities normal, atraumatic, no cyanosis or edema   Neuro:   alert, moves all extremities spontaneously, good 3-phase [place] reflex, good suck reflex and good rooting reflex          Assessment:      Diagnosis Orders   1. Encounter for well child visit at 2 weeks of age     3. Diaper rash        Plan:      1. Anticipatory Guidance: Specific topics reviewed: typical  feeding habits, adequate diet for breastfeeding, avoiding putting to bed with bottle, fluoride supplementation if unfluoridated water supply, encouraged that any formula used be iron-fortified, wait to introduce solids until 4-6 months old, safe sleep furniture, sleeping face up to prevent SIDS, limiting daytime sleep to 3-4 hours at a time, placing in crib before completely asleep, making middle-of-night feeds \"brief & boring\", most babies sleep through night by 6mos, impossible to \"spoil\" infants at this age, car seat issues, including proper placement, smoke detectors, setting hot water heater less than 120 degrees fahrenheit, risk of falling once learns to roll, avoiding infant walkers, avoiding small toys (choking hazard), never leave unattended except in crib, obtain and know how to use thermometer and call for decreased feeding, fever >/=100.4.    2. Screening tests:   a. State  metabolic screen (if not done previously after 11days old): pending  b. Hb or HCT (CDC recommends before 6 months if  or low birth weight): not indicated    3. Ultrasound of the hips to screen for developmental dysplasia of the hip (consider per AAP if breech or if both family hx of DDH + female): not applicable    4.  Hearing screening: Screening done in hospital (results passed in hospital per mom) (Recommended by NIH and AAP; USPSTF weekly recommends screening if: family h/o childhood sensorineural deafness, congenital  infections, head/neck malformations, < 1.5kg birthweight, bacterial meningitis, jaundice w/exchange transfusion, severe  asphyxia, ototoxic medications, or evidence of any syndrome known to include hearing loss)    5. Immunizations today: none  History of previous adverse reactions to immunizations? no    6. Diaper rash: con't to use cream from derm      DISPOSITION    Return in about 1 month (around 2020) for 2 month well child visit, sooner as needed. Sotero Zavala released without restrictions.       Electronically signed by Jackie Andersen DO on 2020 at 3:48 PM

## 2022-10-19 ENCOUNTER — TELEPHONE (OUTPATIENT)
Dept: FAMILY MEDICINE | Facility: CLINIC | Age: 67
End: 2022-10-19

## 2022-10-19 DIAGNOSIS — J12.82 PNEUMONIA DUE TO 2019 NOVEL CORONAVIRUS: Primary | ICD-10-CM

## 2022-10-19 DIAGNOSIS — U07.1 PNEUMONIA DUE TO 2019 NOVEL CORONAVIRUS: Primary | ICD-10-CM

## 2022-10-19 NOTE — TELEPHONE ENCOUNTER
General Call    Contacts       Type Contact Phone/Fax    10/19/2022 02:01 PM CDT Phone (Incoming) [facility] [phone]        Reason for Call:  Overnight Oximetry    What are your questions or concerns:  Fax number to send the order to is [phone]    Date of last appointment with provider: 8/15/22    Could we send this information to you in [facility] or would you prefer to receive a phone call?:

## 2022-10-21 NOTE — TELEPHONE ENCOUNTER
Patient called, information given for patient to call [facility].    Lisa De Santiago XRO/

## 2022-11-26 ENCOUNTER — HEALTH MAINTENANCE LETTER (OUTPATIENT)
Age: 67
End: 2022-11-26

## 2022-11-26 DIAGNOSIS — R42 VERTIGO: ICD-10-CM

## 2022-11-27 ENCOUNTER — MYC MEDICAL ADVICE (OUTPATIENT)
Dept: FAMILY MEDICINE | Facility: CLINIC | Age: 67
End: 2022-11-27

## 2022-11-27 DIAGNOSIS — U07.1 PNEUMONIA DUE TO 2019 NOVEL CORONAVIRUS: Primary | ICD-10-CM

## 2022-11-27 DIAGNOSIS — J12.82 PNEUMONIA DUE TO 2019 NOVEL CORONAVIRUS: Primary | ICD-10-CM

## 2022-11-27 RX ORDER — OXAZEPAM 10 MG
CAPSULE ORAL
Qty: 40 CAPSULE | Refills: 1 | Status: SHIPPED | OUTPATIENT
Start: 2022-11-27 | End: 2023-01-30

## 2022-12-01 NOTE — TELEPHONE ENCOUNTER
Patient reports she had an overnight oximetry study completed a month ago and wants results.  I am unable to find anything scanned into her chart in either the procedures or media tabs.  Please check into this and obtain a copy of her test results.  Electronically signed by Greg Schoen, MD

## 2022-12-02 NOTE — TELEPHONE ENCOUNTER
Report was located and I did call Stacie with the results, showing that she is still running low saturations at night.  She notes that her tubing cracked so for the past few nights she has not been using the oxygen and is not sleeping as well. The results showed that she has a low baseline saturation of only about 93% and spent 36 events with a saturation less than 88 for a total time of about 17 minutes.  Based on that we discussed continued use of oxygen at night with making a referral for PFTs.  Based on those results would refer/confer with pulmonology regarding her lung issues post covid.  Patient agreeable to the plan.    Electronically signed by Greg Schoen, MD

## 2022-12-26 ENCOUNTER — TRANSFERRED RECORDS (OUTPATIENT)
Dept: FAMILY MEDICINE | Facility: CLINIC | Age: 67
End: 2022-12-26

## 2023-01-16 ENCOUNTER — HOSPITAL ENCOUNTER (OUTPATIENT)
Dept: RESPIRATORY THERAPY | Facility: CLINIC | Age: 68
Discharge: HOME | End: 2023-01-16
Attending: FAMILY MEDICINE | Admitting: FAMILY MEDICINE
Payer: COMMERCIAL

## 2023-01-16 DIAGNOSIS — U07.1 PNEUMONIA DUE TO 2019 NOVEL CORONAVIRUS: ICD-10-CM

## 2023-01-16 DIAGNOSIS — J12.82 PNEUMONIA DUE TO 2019 NOVEL CORONAVIRUS: ICD-10-CM

## 2023-01-16 PROCEDURE — 94729 DIFFUSING CAPACITY: CPT

## 2023-01-16 PROCEDURE — 94060 EVALUATION OF WHEEZING: CPT

## 2023-01-16 PROCEDURE — 94726 PLETHYSMOGRAPHY LUNG VOLUMES: CPT

## 2023-01-16 PROCEDURE — 999N000156 HC STATISTIC RCP CONSULT EA 30 MIN

## 2023-01-16 PROCEDURE — 999N000157 HC STATISTIC RCP TIME EA 10 MIN

## 2023-01-16 PROCEDURE — 250N000009 HC RX 250: Performed by: FAMILY MEDICINE

## 2023-01-16 RX ORDER — ALBUTEROL SULFATE 0.83 MG/ML
2.5 SOLUTION RESPIRATORY (INHALATION)
Status: COMPLETED | OUTPATIENT
Start: 2023-01-16 | End: 2023-01-16

## 2023-01-16 RX ADMIN — ALBUTEROL SULFATE 2.5 MG: 2.5 SOLUTION RESPIRATORY (INHALATION) at 09:39

## 2023-01-16 NOTE — PROGRESS NOTES
FVC FEV1 FEV1/FVC DLCOunc DLCOcor TLC (Pleth) RV (Pleth) FRC(Pleth) SVC IC ERV   Visit Date L L % ml/min/mmHg ml/min/mmHg L L L L L L   Pre              3/8/2017 1:59:20 PM 2.33 2.02 87 21.57   4.39 1.92 2.16 2.48 2.24 0.24    1/16/2023 8:50:42 AM 2.03 1.85 91 15.65   3.45 1.24 1.59 2.22 1.87 0.35

## 2023-01-16 NOTE — PROGRESS NOTES
Pre-Bronch    Post-Bronch         Actual Pred %Pred LLN Actual %Pred %Chng       ---- SPIROMETRY ----                          FVC (L) 2.03 2.67 75 1.98 2.08 77 +2            FEV1 (L) 1.85 2.10 88 1.55 1.90 90 +2            FEV1/FVC (%) 91 79   66 92   +0            FEF 25% (L/sec) 5.89       5.16   -12            FEF 75% (L/sec) 1.26       1.61   +28            FEF 25-75% (L/sec) 3.49 1.89 184 0.91 3.87 204 +10            FEF Max (L/sec) 5.95 5.48 108 3.92 5.86 106 -1            FIVC (L) 1.74       2.00   +15            FIF Max (L/sec) 1.78 4.35 40 2.55 2.41 55 +35            MVV (L/min)   82   64                  MEP (cmH2O)                          MIP (cmH2O)                                                     ---- LUNG VOLUMES ----                          SVC (L) 2.22 2.76 80 1.99                  IC (L) 1.87 2.34 79                    ERV (L) 0.35 0.42 83                    FRC (N2) (L)   2.54   1.54                  RV (N2) (L)   1.86   1.16                  TLC (N2) (L)   4.44   3.24                  RV/TLC (N2) (%)   41   30                  [place] Time (min)                          FRC(Pleth) (L) 1.59 2.54 62 1.54                  RV (Pleth) (L) 1.24 1.86 66 1.16                  TLC (Pleth) (L) 3.45 4.44 77 3.45                  RV/TLC (Pleth) (%) 36 41   30                  Trapped Gas (L)                                                     ---- DIFFUSION ----                          DLCOunc (ml/min/mmHg) 15.65 17.80 87 13.46                  DLCOcor (ml/min/mmHg)   17.80   13.46                  DL/VA (ml/min/mmHg/L) 5.06 4.26                      VA (L) 3.09 4.18 74 3.34                  Hgb (gm/dL)   12-18                                                 ---- AIRWAYS RESISTANCE ----                          Raw (cmH2O/L/s) 2.55 2.24 113                    Gaw (L/s/cmH2O) 0.55 1.03 53                    sRaw (cmH2O*s) 4.38 < 4.76                      sGaw (1/cmH2O*s) 0.32 0.10 312

## 2023-01-16 NOTE — DISCHARGE INSTRUCTIONS
Thank you for completing pulmonary function testing today.  All results will be scanned into your epic results for your doctor to review.  Please resume taking all your current prescribed medications and diet as directed by your provider.   If you have not heard from your provider about your testing within two weeks and do not have a follow-up appointment scheduled with them please contact your provider about any questions you have concerning your testing.   Thank you  The [facility] Pulmonary Function Lab

## 2023-01-17 LAB
DLCOUNC-%PRED-PRE: 87 %
DLCOUNC-PRE: 15.65 ML/MIN/MMHG
DLCOUNC-PRED: 17.8 ML/MIN/MMHG
ERV-%PRED-PRE: 83 %
ERV-PRE: 0.35 L
ERV-PRED: 0.42 L
EXPTIME-PRE: 6.35 SEC
FEF2575-%PRED-POST: 204 %
FEF2575-%PRED-PRE: 184 %
FEF2575-POST: 3.87 L/SEC
FEF2575-PRE: 3.49 L/SEC
FEF2575-PRED: 1.89 L/SEC
FEFMAX-%PRED-PRE: 108 %
FEFMAX-PRE: 5.95 L/SEC
FEFMAX-PRED: 5.48 L/SEC
FEV1-%PRED-PRE: 88 %
FEV1-PRE: 1.85 L
FEV1FEV6-PRE: 91 %
FEV1FEV6-PRED: 80 %
FEV1FVC-PRE: 91 %
FEV1FVC-PRED: 79 %
FEV1SVC-PRE: 83 %
FEV1SVC-PRED: 76 %
FIFMAX-PRE: 1.78 L/SEC
FRCPLETH-%PRED-PRE: 62 %
FRCPLETH-PRE: 1.59 L
FRCPLETH-PRED: 2.54 L
FVC-%PRED-PRE: 75 %
FVC-PRE: 2.03 L
FVC-PRED: 2.67 L
GAW-%PRED-PRE: 53 %
GAW-PRE: 0.55 L/S/CMH2O
GAW-PRED: 1.03 L/S/CMH2O
IC-%PRED-PRE: 79 %
IC-PRE: 1.87 L
IC-PRED: 2.34 L
RVPLETH-%PRED-PRE: 66 %
RVPLETH-PRE: 1.24 L
RVPLETH-PRED: 1.86 L
SGAW-%PRED-PRE: 312 %
SGAW-PRE: 0.32 1/CMH2O*S
SGAW-PRED: 0.1 1/CMH2O*S
SRAW-%PRED-PRE: 92 %
SRAW-PRE: 4.38 CMH2O*S
SRAW-PRED: 4.76 CMH2O*S
TLCPLETH-%PRED-PRE: 77 %
TLCPLETH-PRE: 3.45 L
TLCPLETH-PRED: 4.44 L
VA-%PRED-PRE: 74 %
VA-PRE: 3.09 L
VC-%PRED-PRE: 80 %
VC-PRE: 2.22 L
VC-PRED: 2.76 L

## 2023-01-28 DIAGNOSIS — R42 VERTIGO: ICD-10-CM

## 2023-01-30 RX ORDER — OXAZEPAM 10 MG
CAPSULE ORAL
Qty: 40 CAPSULE | Refills: 1 | Status: SHIPPED | OUTPATIENT
Start: 2023-01-30 | End: 2023-04-06

## 2023-03-25 ENCOUNTER — HEALTH MAINTENANCE LETTER (OUTPATIENT)
Age: 68
End: 2023-03-25

## 2023-04-06 DIAGNOSIS — R42 VERTIGO: ICD-10-CM

## 2023-04-06 RX ORDER — OXAZEPAM 10 MG
CAPSULE ORAL
Qty: 40 CAPSULE | Refills: 1 | Status: SHIPPED | OUTPATIENT
Start: 2023-04-06 | End: 2023-06-20

## 2023-06-19 DIAGNOSIS — R42 VERTIGO: ICD-10-CM

## 2023-06-20 RX ORDER — OXAZEPAM 10 MG
CAPSULE ORAL
Qty: 40 CAPSULE | Refills: 1 | Status: SHIPPED | OUTPATIENT
Start: 2023-06-20 | End: 2023-09-08

## 2023-06-24 ENCOUNTER — APPOINTMENT (OUTPATIENT)
Dept: GENERAL RADIOLOGY | Facility: CLINIC | Age: 68
End: 2023-06-24
Attending: NURSE PRACTITIONER
Payer: COMMERCIAL

## 2023-06-24 ENCOUNTER — HOSPITAL ENCOUNTER (EMERGENCY)
Facility: CLINIC | Age: 68
Discharge: HOME | End: 2023-06-24
Attending: NURSE PRACTITIONER | Admitting: NURSE PRACTITIONER
Payer: COMMERCIAL

## 2023-06-24 VITALS
TEMPERATURE: 98.8 F | HEART RATE: 99 BPM | RESPIRATION RATE: 20 BRPM | BODY MASS INDEX: 30.73 KG/M2 | DIASTOLIC BLOOD PRESSURE: 86 MMHG | WEIGHT: 167 LBS | HEIGHT: 62 IN | OXYGEN SATURATION: 95 % | SYSTOLIC BLOOD PRESSURE: 174 MMHG

## 2023-06-24 DIAGNOSIS — J20.9 ACUTE BRONCHITIS WITH SYMPTOMS > 10 DAYS: ICD-10-CM

## 2023-06-24 LAB
ANION GAP SERPL CALCULATED.3IONS-SCNC: 11 MMOL/L (ref 7–15)
BASOPHILS # BLD AUTO: 0 10E3/UL (ref 0–0.2)
BASOPHILS NFR BLD AUTO: 0 %
BUN SERPL-MCNC: 11.7 MG/DL (ref 8–23)
CALCIUM SERPL-MCNC: 9.5 MG/DL (ref 8.8–10.2)
CHLORIDE SERPL-SCNC: 102 MMOL/L (ref 98–107)
CREAT SERPL-MCNC: 0.63 MG/DL (ref 0.51–0.95)
DEPRECATED HCO3 PLAS-SCNC: 25 MMOL/L (ref 22–29)
EOSINOPHIL # BLD AUTO: 0.3 10E3/UL (ref 0–0.7)
EOSINOPHIL NFR BLD AUTO: 3 %
ERYTHROCYTE [DISTWIDTH] IN BLOOD BY AUTOMATED COUNT: 13.7 % (ref 10–15)
FLUAV RNA SPEC QL NAA+PROBE: NEGATIVE
FLUBV RNA RESP QL NAA+PROBE: NEGATIVE
GFR SERPL CREATININE-BSD FRML MDRD: >90 ML/MIN/1.73M2
GLUCOSE SERPL-MCNC: 116 MG/DL (ref 70–99)
HCT VFR BLD AUTO: 39.1 % (ref 35–47)
HGB BLD-MCNC: 12.8 G/DL (ref 11.7–15.7)
IMM GRANULOCYTES # BLD: 0 10E3/UL
IMM GRANULOCYTES NFR BLD: 0 %
LYMPHOCYTES # BLD AUTO: 2 10E3/UL (ref 0.8–5.3)
LYMPHOCYTES NFR BLD AUTO: 21 %
MCH RBC QN AUTO: 28.9 PG (ref 26.5–33)
MCHC RBC AUTO-ENTMCNC: 32.7 G/DL (ref 31.5–36.5)
MCV RBC AUTO: 88 FL (ref 78–100)
MONOCYTES # BLD AUTO: 1 10E3/UL (ref 0–1.3)
MONOCYTES NFR BLD AUTO: 10 %
NEUTROPHILS # BLD AUTO: 6.4 10E3/UL (ref 1.6–8.3)
NEUTROPHILS NFR BLD AUTO: 66 %
NRBC # BLD AUTO: 0 10E3/UL
NRBC BLD AUTO-RTO: 0 /100
PLATELET # BLD AUTO: 260 10E3/UL (ref 150–450)
POTASSIUM SERPL-SCNC: 3.9 MMOL/L (ref 3.4–5.3)
RBC # BLD AUTO: 4.43 10E6/UL (ref 3.8–5.2)
RSV RNA SPEC NAA+PROBE: NEGATIVE
SARS-COV-2 RNA RESP QL NAA+PROBE: NEGATIVE
SODIUM SERPL-SCNC: 138 MMOL/L (ref 136–145)
WBC # BLD AUTO: 9.8 10E3/UL (ref 4–11)

## 2023-06-24 PROCEDURE — 80048 BASIC METABOLIC PNL TOTAL CA: CPT | Performed by: NURSE PRACTITIONER

## 2023-06-24 PROCEDURE — 250N000012 HC RX MED GY IP 250 OP 636 PS 637: Performed by: NURSE PRACTITIONER

## 2023-06-24 PROCEDURE — 87637 SARSCOV2&INF A&B&RSV AMP PRB: CPT | Performed by: NURSE PRACTITIONER

## 2023-06-24 PROCEDURE — 85025 COMPLETE CBC W/AUTO DIFF WBC: CPT | Performed by: NURSE PRACTITIONER

## 2023-06-24 PROCEDURE — 99284 EMERGENCY DEPT VISIT MOD MDM: CPT | Mod: 25 | Performed by: NURSE PRACTITIONER

## 2023-06-24 PROCEDURE — 250N000013 HC RX MED GY IP 250 OP 250 PS 637: Performed by: NURSE PRACTITIONER

## 2023-06-24 PROCEDURE — 99284 EMERGENCY DEPT VISIT MOD MDM: CPT | Performed by: NURSE PRACTITIONER

## 2023-06-24 PROCEDURE — 71045 X-RAY EXAM CHEST 1 VIEW: CPT

## 2023-06-24 PROCEDURE — 36415 COLL VENOUS BLD VENIPUNCTURE: CPT | Performed by: NURSE PRACTITIONER

## 2023-06-24 RX ORDER — DOXYCYCLINE 100 MG/1
100 CAPSULE ORAL ONCE
Status: COMPLETED | OUTPATIENT
Start: 2023-06-24 | End: 2023-06-24

## 2023-06-24 RX ORDER — PREDNISONE 20 MG/1
40 TABLET ORAL ONCE
Status: COMPLETED | OUTPATIENT
Start: 2023-06-24 | End: 2023-06-24

## 2023-06-24 RX ORDER — PREDNISONE 20 MG/1
40 TABLET ORAL DAILY
Qty: 8 TABLET | Refills: 0 | Status: SHIPPED | OUTPATIENT
Start: 2023-06-24

## 2023-06-24 RX ORDER — PREDNISONE 20 MG/1
40 TABLET ORAL DAILY
Qty: 8 TABLET | Refills: 0 | Status: SHIPPED | OUTPATIENT
Start: 2023-06-24 | End: 2023-06-24

## 2023-06-24 RX ORDER — DOXYCYCLINE 100 MG/1
100 CAPSULE ORAL 2 TIMES DAILY
Qty: 20 CAPSULE | Refills: 0 | Status: SHIPPED | OUTPATIENT
Start: 2023-06-24 | End: 2023-07-04

## 2023-06-24 RX ADMIN — PREDNISONE 40 MG: 20 TABLET ORAL at 20:30

## 2023-06-24 RX ADMIN — DOXYCYCLINE HYCLATE 100 MG: 100 CAPSULE ORAL at 20:30

## 2023-06-24 ASSESSMENT — ACTIVITIES OF DAILY LIVING (ADL): ADLS_ACUITY_SCORE: 35

## 2023-06-24 NOTE — ED TRIAGE NOTES
Cough for 1.5 months, does wear home O2 at [facility]. Baseline is 1L but has increased to 1.5L with being ill. In triage, O2 sats upper 90s on RA.      Triage Assessment     Row Name 06/24/23 0306       Triage Assessment (Adult)    Airway WDL WDL       Respiratory WDL    Respiratory WDL X;cough       Skin Circulation/Temperature WDL    Skin Circulation/Temperature WDL WDL       Cardiac WDL    Cardiac WDL WDL       Peripheral/Neurovascular WDL    Peripheral Neurovascular WDL WDL       Cognitive/Neuro/Behavioral WDL    Cognitive/Neuro/Behavioral WDL WDL       [place] Coma Scale    Best Eye Response 4-->(E4) spontaneous    Best Motor Response 6-->(M6) obeys commands    Best Verbal Response 5-->(V5) oriented    [place] Coma Scale Score 15

## 2023-06-25 NOTE — DISCHARGE INSTRUCTIONS
Prednisone 40 mg daily for 5 days. (First dose given here today)  Doxycyline 100 mg twice a day for 10 days. (First dose given here today.    Recheck for worsening symptoms.

## 2023-06-25 NOTE — ED PROVIDER NOTES
History     Chief Complaint   Patient presents with     Cough     HPI  Stacie Hair is a 67 year old female who presents for evaluation of cough and chest congestion.  Symptoms started 1 month ago.  She started to feel better after couple weeks, then her  came down with the same respiratory symptoms.  Patient never completely resolved and her symptoms worsened over the last 7 days.  Cough is nonproductive.  Denies fever or chills.  Patient had COVID-19 in 2020 requiring hospitalization.  She has had home oxygen as needed ever since then due to scarring in her lungs.  Typically just uses the oxygen at night.  Former smoker.    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 01/26/2022     Priority: Medium     LBBB (left bundle branch block)-chronic 01/26/2022     Priority: Medium     Benign positional vertigo 03/2020     Priority: Medium     Hyperlipidemia LDL goal <130 09/08/2018     Priority: Medium     Hypertension 2017     Priority: Medium     Family history of coronary artery disease 07/17/2011     Priority: Medium     Female stress incontinence 10/24/2002     Priority: Medium     Migraines 1972     Priority: Medium        Past Medical History:    Past Medical History:   Diagnosis Date     Benign positional vertigo 3/2020     Herniated discs      Hypertension 2017     Migraines 1972-     Myalgia and myositis, unspecified      Tinnitus 2013       Past Surgical History:    Past Surgical  "History:   Procedure Laterality Date     COLONOSCOPY  2011    Procedure:COMBINED COLONOSCOPY, SINGLE BIOPSY/POLYPECTOMY BY BIOPSY; Colonoscopy, with polypectomy by biopsy; Surgeon:MADELEINE MONROY; Location:[facility]     HC EXCISION BREAST LESION, OPEN >=1      benign, age 20     ZZC  DELIVERY ONLY      , Transfusion     ZZC LIGATE FALLOPIAN TUBE,POSTPARTUM      Tubal Ligation       Family History:    Family History   Problem Relation Age of Onset     Hypertension Mother      Heart Disease Father         MI       Social History:  Marital Status:   [2]  Social History     Tobacco Use     Smoking status: Former     Packs/day: 0.00     Years: 0.00     Pack years: 0.00     Types: Cigarettes     Smokeless tobacco: Never     Tobacco comments:     1980-quit   Vaping Use     Vaping Use: Never used   Substance Use Topics     Alcohol use: Yes     Alcohol/week: 0.0 standard drinks of alcohol     Comment: occasional     Drug use: No        Medications:    doxycycline hyclate (VIBRAMYCIN) 100 MG capsule  predniSONE (DELTASONE) 20 MG tablet  albuterol (PROAIR HFA/PROVENTIL HFA/VENTOLIN HFA) 108 (90 Base) MCG/ACT inhaler  alcohol swab prep pads  blood glucose (NO BRAND SPECIFIED) test strip  blood glucose calibration (NO BRAND SPECIFIED) solution  blood glucose monitoring (NO BRAND SPECIFIED) meter device kit  cyclobenzaprine (FLEXERIL) 10 MG tablet  metFORMIN (GLUCOPHAGE) 500 MG tablet  Omega-3 Fatty Acids (OMEGA 3 PO)  oxazepam (SERAX) 10 MG capsule  STATIN NOT PRESCRIBED (INTENTIONAL)  thin (NO BRAND SPECIFIED) lancets          Review of Systems  As mentioned above in the history present illness. All other systems were reviewed and are negative.    Physical Exam   BP: (!) 165/73  Pulse: 101  Temp: 98.8  F (37.1  C)  Resp: 20  Height: 157.5 cm (5' 2\")  Weight: 75.8 kg (167 lb)  SpO2: 98 %      Physical Exam  Constitutional:       General: She is not in acute distress.     Appearance: She is " well-developed. She is ill-appearing.   HENT:      Head: Normocephalic and atraumatic.      Right Ear: External ear normal.      Left Ear: External ear normal.      Nose: Congestion present.      Mouth/Throat:      Mouth: Mucous membranes are moist.   Eyes:      Conjunctiva/sclera: Conjunctivae normal.   Cardiovascular:      Rate and Rhythm: Normal rate and regular rhythm.      Heart sounds: Normal heart sounds. No murmur heard.  Pulmonary:      Effort: Pulmonary effort is normal. No respiratory distress.      Breath sounds: Decreased air movement present. Examination of the right-lower field reveals decreased breath sounds. Examination of the left-lower field reveals decreased breath sounds. Decreased breath sounds present.      Comments: Frequent coughing during exam.  Abdominal:      General: Bowel sounds are normal. There is no distension.      Palpations: Abdomen is soft.      Tenderness: There is no abdominal tenderness.   Musculoskeletal:         General: Normal range of motion.   Skin:     General: Skin is warm and dry.      Findings: No rash.   Neurological:      General: No focal deficit present.      Mental Status: She is alert and oriented to person, place, and time.         ED Course                 Procedures              Results for orders placed or performed during the hospital encounter of 06/24/23 (from the past 24 hour(s))   CBC with platelets differential    Narrative    The following orders were created for panel order CBC with platelets differential.  Procedure                               Abnormality         Status                     ---------                               -----------         ------                     CBC with platelets and d...[[id number]]                      Final result                 Please view results for these tests on the individual orders.   Basic metabolic panel   Result Value Ref Range    Sodium 138 136 - 145 mmol/L    Potassium 3.9 3.4 - 5.3 mmol/L    Chloride  102 98 - 107 mmol/L    Carbon Dioxide (CO2) 25 22 - 29 mmol/L    Anion Gap 11 7 - 15 mmol/L    Urea Nitrogen 11.7 8.0 - 23.0 mg/dL    Creatinine 0.63 0.51 - 0.95 mg/dL    Calcium 9.5 8.8 - 10.2 mg/dL    Glucose 116 (H) 70 - 99 mg/dL    GFR Estimate >90 >60 mL/min/1.73m2   Symptomatic Influenza A/B, RSV, & SARS-CoV2 PCR (COVID-19) Nose    Specimen: Nose; Swab   Result Value Ref Range    Influenza A PCR Negative Negative    Influenza B PCR Negative Negative    RSV PCR Negative Negative    SARS CoV2 PCR Negative Negative    Narrative    Testing was performed using the Xpert Xpress CoV2/Flu/RSV Assay on the [employer] Instrument. This test should be ordered for the detection of SARS-CoV-2, influenza, and RSV viruses in individuals who meet clinical and/or epidemiological criteria. Test performance is unknown in asymptomatic patients. This test is for in vitro diagnostic use under the FDA EUA for laboratories certified under CLIA to perform high or moderate complexity testing. This test has not been FDA cleared or approved. A negative result does not rule out the presence of PCR inhibitors in the specimen or target RNA in concentration below the limit of detection for the assay. If only one viral target is positive but coinfection with multiple targets is suspected, the sample should be re-tested with another FDA cleared, approved, or authorized test, if coinfection would change clinical management. This test was validated by the [facility]. These laboratories are certified under the Clinical Laboratory Improvement Amendments of 1988 (CLIA-88) as qualified to perform high complexity laboratory testing.   CBC with platelets and differential   Result Value Ref Range    WBC Count 9.8 4.0 - 11.0 10e3/uL    RBC Count 4.43 3.80 - 5.20 10e6/uL    Hemoglobin 12.8 11.7 - 15.7 g/dL    Hematocrit 39.1 35.0 - 47.0 %    MCV 88 78 - 100 fL    MCH 28.9 26.5 - 33.0 pg    MCHC 32.7 31.5 - 36.5 g/dL    RDW 13.7  10.0 - 15.0 %    Platelet Count 260 150 - 450 10e3/uL    % Neutrophils 66 %    % Lymphocytes 21 %    % Monocytes 10 %    % Eosinophils 3 %    % Basophils 0 %    % Immature Granulocytes 0 %    NRBCs per 100 WBC 0 <1 /100    Absolute Neutrophils 6.4 1.6 - 8.3 10e3/uL    Absolute Lymphocytes 2.0 0.8 - 5.3 10e3/uL    Absolute Monocytes 1.0 0.0 - 1.3 10e3/uL    Absolute Eosinophils 0.3 0.0 - 0.7 10e3/uL    Absolute Basophils 0.0 0.0 - 0.2 10e3/uL    Absolute Immature Granulocytes 0.0 <=0.4 10e3/uL    Absolute NRBCs 0.0 10e3/uL   XR Chest Port 1 View    Narrative    EXAM: XR CHEST PORT 1 VIEW  LOCATION: [facility]  DATE: 6/24/2023    INDICATION: cough  COMPARISON: 01/26/2022      Impression    IMPRESSION: Heart size mildly enlarged. Chest otherwise negative. Lungs are clear.       Medications   doxycycline hyclate (VIBRAMYCIN) capsule 100 mg (100 mg Oral $Given 6/24/23 2030)   predniSONE (DELTASONE) tablet 40 mg (40 mg Oral $Given 6/24/23 2030)       Assessments & Plan (with Medical Decision Making)   No worrisome lab findings.  Chest x-ray is negative for infiltrate or consolidation to suggest pneumonia.  Heart size is mildly enlarged, unchanged from previous chest imaging.  I discussed the lab and imaging findings with patient.  I suspect she has acute bronchitis and given the long course of her illness she will be treated with antibiotics.  Additionally, she will be given a 5-day course of prednisone to help with inflammation and her spasmodic coughing.  She is not in acute respiratory distress and no hypoxia to warrant admission to the hospital.  Plan:  Prednisone 40 mg daily for 5 days. (First dose given here today)  Doxycyline 100 mg twice a day for 10 days. (First dose given here today.    Recheck for worsening symptoms.  Discharge Medication List as of 6/24/2023  8:25 PM      START taking these medications    Details   doxycycline hyclate (VIBRAMYCIN) 100 MG capsule Take 1 capsule  (100 mg) by mouth 2 times daily for 10 days, Disp-20 capsule, R-0, E-Prescribe             Final diagnoses:   Acute bronchitis with symptoms > 10 days       6/24/2023   [facility] EMERGENCY DEPT     Anat La APRN CNP  06/24/23 2120

## 2023-08-19 ENCOUNTER — HEALTH MAINTENANCE LETTER (OUTPATIENT)
Age: 68
End: 2023-08-19

## 2023-09-08 DIAGNOSIS — R42 VERTIGO: ICD-10-CM

## 2023-09-08 RX ORDER — OXAZEPAM 10 MG
CAPSULE ORAL
Qty: 40 CAPSULE | Refills: 1 | Status: SHIPPED | OUTPATIENT
Start: 2023-09-08 | End: 2023-11-17

## 2023-10-28 ENCOUNTER — HEALTH MAINTENANCE LETTER (OUTPATIENT)
Age: 68
End: 2023-10-28

## 2023-11-17 DIAGNOSIS — R42 VERTIGO: ICD-10-CM

## 2023-11-17 RX ORDER — OXAZEPAM 10 MG
CAPSULE ORAL
Qty: 40 CAPSULE | Refills: 1 | Status: SHIPPED | OUTPATIENT
Start: 2023-11-17 | End: 2024-02-01

## 2023-11-29 NOTE — PROGRESS NOTES
Please contact Stacie and inform her that I am refilling a month with one refill on her meds as she needs to come in for a visit. I have placed lab orders for her to come in and do at her convenience prior to a visit.  Electronically signed by Greg Schoen, MD    
Spoke to Stacie and gave message. Appointments have been scheduled.   
Never

## 2024-01-06 ENCOUNTER — HEALTH MAINTENANCE LETTER (OUTPATIENT)
Age: 69
End: 2024-01-06

## 2024-01-06 NOTE — PROGRESS NOTES
Clinic Care Coordination Contact  [facility]/Voicemail    Referral Source: IP Report  Clinical Data:   Date of Admission:  1/26/2022  Date of Discharge:  2/7/2022  Discharge Diagnoses     Principal Problem:    Pneumonia due to 2019 novel coronavirus  Care Coordinator Outreach  Outreach attempted x 1.  Left message on patient's voicemail with call back information and requested return call.  Plan:  Care Coordinator will try to reach patient again in 3-5 business days.  [facility]   Nola Nugent RN, Care Coordinator   [facility]'s   E-mail [email or web address]   [phone]     Fall Risk

## 2024-01-31 ENCOUNTER — DOCUMENTATION ONLY (OUTPATIENT)
Dept: FAMILY MEDICINE | Facility: CLINIC | Age: 69
End: 2024-01-31
Payer: COMMERCIAL

## 2024-01-31 DIAGNOSIS — U07.1 COVID-19: Primary | ICD-10-CM

## 2024-01-31 DIAGNOSIS — J12.82 PNEUMONIA DUE TO CORONAVIRUS DISEASE 2019 (CODE): ICD-10-CM

## 2024-02-01 DIAGNOSIS — R42 VERTIGO: ICD-10-CM

## 2024-02-01 RX ORDER — OXAZEPAM 10 MG
CAPSULE ORAL
Qty: 40 CAPSULE | Refills: 1 | Status: SHIPPED | OUTPATIENT
Start: 2024-02-01 | End: 2024-04-16

## 2024-04-16 DIAGNOSIS — R42 VERTIGO: ICD-10-CM

## 2024-04-16 RX ORDER — OXAZEPAM 10 MG
CAPSULE ORAL
Qty: 40 CAPSULE | Refills: 1 | Status: SHIPPED | OUTPATIENT
Start: 2024-04-16 | End: 2024-06-23

## 2024-05-25 ENCOUNTER — HEALTH MAINTENANCE LETTER (OUTPATIENT)
Age: 69
End: 2024-05-25

## 2024-06-21 DIAGNOSIS — R42 VERTIGO: ICD-10-CM

## 2024-06-23 RX ORDER — OXAZEPAM 10 MG
CAPSULE ORAL
Qty: 40 CAPSULE | Refills: 1 | Status: SHIPPED | OUTPATIENT
Start: 2024-06-23 | End: 2024-09-10

## 2024-09-06 NOTE — TELEPHONE ENCOUNTER
"Routing to PCP for refill if appropriate.   Routing to schedulers for appointment with PCP.     Hydrochlorothiazide  Last Written Prescription Date:  05/01/2019  Last Fill Quantity: 360,  # refills: 1   Last office visit: 9/7/2018 with prescribing provider:  Schoen   Future Office Visit:  None  Routing refill request to provider for review/approval because:  Patient needs to be seen because it has been more than 1 year since last office visit.  Blood pressures elevated above goal.     Requested Prescriptions   Pending Prescriptions Disp Refills     hydrochlorothiazide (HYDRODIURIL) 25 MG tablet [Pharmacy Med Name: HYDROCHLOROTHIAZIDE 25MG TABS] 30 tablet 1     Sig: TAKE ONE TABLET BY MOUTH EVERY DAY       Diuretics (Including Combos) Protocol Failed - 9/10/2019 12:57 PM        Failed - Blood pressure under 140/90 in past 12 months     BP Readings from Last 3 Encounters:   09/07/18 (!) 164/104   08/27/18 (!) 188/97   07/18/18 (!) 165/98           Failed - Recent (12 mo) or future (30 days) visit within the authorizing provider's specialty     Patient had office visit in the last 12 months or has a visit in the next 30 days with authorizing provider or within the authorizing provider's specialty.  See \"Patient Info\" tab in inbasket, or \"Choose Columns\" in Meds & Orders section of the refill encounter.          Failed - Normal serum creatinine on file in past 12 months     Recent Labs   Lab Test 08/27/18  1143   CR 0.72           Failed - Normal serum potassium on file in past 12 months     Recent Labs   Lab Test 08/27/18  1143   POTASSIUM 4.1           Failed - Normal serum sodium on file in past 12 months     Recent Labs   Lab Test 08/27/18  1143              Passed - Medication is active on med list        Passed - Patient is age 18 or older        Passed - No active pregancy on record        Passed - No positive pregnancy test in past 12 months      Alea Allison RN   " no

## 2024-09-09 DIAGNOSIS — R42 VERTIGO: ICD-10-CM

## 2024-09-10 RX ORDER — OXAZEPAM 10 MG
CAPSULE ORAL
Qty: 40 CAPSULE | Refills: 1 | Status: SHIPPED | OUTPATIENT
Start: 2024-09-10

## 2024-10-12 ENCOUNTER — HEALTH MAINTENANCE LETTER (OUTPATIENT)
Age: 69
End: 2024-10-12

## 2024-11-20 DIAGNOSIS — R42 VERTIGO: ICD-10-CM

## 2024-11-21 RX ORDER — OXAZEPAM 10 MG
CAPSULE ORAL
Qty: 40 CAPSULE | Refills: 1 | Status: SHIPPED | OUTPATIENT
Start: 2024-11-21

## 2024-12-21 ENCOUNTER — HEALTH MAINTENANCE LETTER (OUTPATIENT)
Age: 69
End: 2024-12-21

## 2025-01-25 ENCOUNTER — HEALTH MAINTENANCE LETTER (OUTPATIENT)
Age: 70
End: 2025-01-25

## 2025-02-06 DIAGNOSIS — R42 VERTIGO: ICD-10-CM

## 2025-02-06 RX ORDER — OXAZEPAM 10 MG
CAPSULE ORAL
Qty: 40 CAPSULE | Refills: 1 | Status: SHIPPED | OUTPATIENT
Start: 2025-02-06

## 2025-03-31 NOTE — TELEPHONE ENCOUNTER
Problem: Cognitive:  Goal: Knowledge of wound care  Description: Knowledge of wound care  Outcome: Progressing  Goal: Understands risk factors for wounds  Description: Understands risk factors for wounds  Outcome: Progressing     Problem: Wound:  Goal: Will show signs of wound healing; wound closure and no evidence of infection  Description: Will show signs of wound healing; wound closure and no evidence of infection  Outcome: Progressing     Problem: Weight control:  Goal: Ability to maintain an optimal weight for height and age will be supported  Description: Ability to maintain an optimal weight for height and age will be supported  Outcome: Progressing     Problem: Blood Glucose:  Goal: Ability to maintain appropriate glucose levels will improve  Description: Ability to maintain appropriate glucose levels will improve  Outcome: Progressing      Message noted.  Had extensive virtual visit yesterday.  Electronically signed by Greg Schoen, MD

## 2025-04-14 DIAGNOSIS — R42 VERTIGO: ICD-10-CM

## 2025-04-14 RX ORDER — OXAZEPAM 10 MG
CAPSULE ORAL
Qty: 40 CAPSULE | Refills: 1 | Status: SHIPPED | OUTPATIENT
Start: 2025-04-14

## 2025-05-03 ENCOUNTER — HEALTH MAINTENANCE LETTER (OUTPATIENT)
Age: 70
End: 2025-05-03

## 2025-08-16 ENCOUNTER — HEALTH MAINTENANCE LETTER (OUTPATIENT)
Age: 70
End: 2025-08-16